# Patient Record
Sex: MALE | Race: WHITE | NOT HISPANIC OR LATINO | Employment: FULL TIME | URBAN - METROPOLITAN AREA
[De-identification: names, ages, dates, MRNs, and addresses within clinical notes are randomized per-mention and may not be internally consistent; named-entity substitution may affect disease eponyms.]

---

## 2018-10-16 LAB
CREAT ?TM UR-SCNC: 160.9 UMOL/L
EXT MICROALBUMIN URINE RANDOM: 5.7
HBA1C MFR BLD HPLC: 6.3 %
MICROALBUMIN/CREAT UR: 3.5 MG/G{CREAT}

## 2018-11-15 LAB
LEFT EYE DIABETIC RETINOPATHY: NORMAL
RIGHT EYE DIABETIC RETINOPATHY: NORMAL

## 2019-01-16 LAB
CREAT ?TM UR-SCNC: 126.4 UMOL/L
EXT MICROALBUMIN URINE RANDOM: 3.2
HBA1C MFR BLD HPLC: 6.3 %
MICROALBUMIN/CREAT UR: 2.5 MG/G{CREAT}

## 2019-03-27 LAB — HBA1C MFR BLD HPLC: 5.9 %

## 2019-09-18 ENCOUNTER — DOCUMENTATION (OUTPATIENT)
Dept: FAMILY MEDICINE CLINIC | Facility: CLINIC | Age: 69
End: 2019-09-18

## 2019-09-18 PROBLEM — D35.2 PITUITARY MACROADENOMA (HCC): Status: ACTIVE | Noted: 2019-09-18

## 2019-09-18 PROBLEM — E11.319 TYPE 2 DIABETES, CONTROLLED, WITH RETINOPATHY (HCC): Status: ACTIVE | Noted: 2019-09-18

## 2019-09-24 ENCOUNTER — OFFICE VISIT (OUTPATIENT)
Dept: FAMILY MEDICINE CLINIC | Facility: CLINIC | Age: 69
End: 2019-09-24
Payer: COMMERCIAL

## 2019-09-24 ENCOUNTER — LAB (OUTPATIENT)
Dept: LAB | Facility: CLINIC | Age: 69
End: 2019-09-24
Payer: COMMERCIAL

## 2019-09-24 ENCOUNTER — TRANSCRIBE ORDERS (OUTPATIENT)
Dept: ADMINISTRATIVE | Facility: HOSPITAL | Age: 69
End: 2019-09-24

## 2019-09-24 VITALS
WEIGHT: 247.36 LBS | SYSTOLIC BLOOD PRESSURE: 132 MMHG | OXYGEN SATURATION: 96 % | HEIGHT: 69 IN | BODY MASS INDEX: 36.64 KG/M2 | DIASTOLIC BLOOD PRESSURE: 84 MMHG | HEART RATE: 73 BPM

## 2019-09-24 DIAGNOSIS — E11.40 CONTROLLED TYPE 2 DIABETES MELLITUS WITH DIABETIC NEUROPATHY, WITH LONG-TERM CURRENT USE OF INSULIN (HCC): ICD-10-CM

## 2019-09-24 DIAGNOSIS — N40.1 BENIGN PROSTATIC HYPERPLASIA WITH NOCTURIA: ICD-10-CM

## 2019-09-24 DIAGNOSIS — Z23 NEEDS FLU SHOT: ICD-10-CM

## 2019-09-24 DIAGNOSIS — D35.2 PITUITARY MACROADENOMA (HCC): ICD-10-CM

## 2019-09-24 DIAGNOSIS — E78.2 HYPERLIPEMIA, MIXED: ICD-10-CM

## 2019-09-24 DIAGNOSIS — N18.30 CONTROLLED TYPE 2 DIABETES MELLITUS WITH STAGE 3 CHRONIC KIDNEY DISEASE, WITH LONG-TERM CURRENT USE OF INSULIN (HCC): ICD-10-CM

## 2019-09-24 DIAGNOSIS — Z79.4 CONTROLLED TYPE 2 DIABETES MELLITUS WITH DIABETIC NEUROPATHY, WITH LONG-TERM CURRENT USE OF INSULIN (HCC): ICD-10-CM

## 2019-09-24 DIAGNOSIS — E11.3299 CONTROLLED TYPE 2 DIABETES MELLITUS WITH MILD NONPROLIFERATIVE RETINOPATHY WITHOUT MACULAR EDEMA, WITHOUT LONG-TERM CURRENT USE OF INSULIN, UNSPECIFIED LATERALITY (HCC): Primary | ICD-10-CM

## 2019-09-24 DIAGNOSIS — Z11.59 NEED FOR HEPATITIS C SCREENING TEST: ICD-10-CM

## 2019-09-24 DIAGNOSIS — Z86.2 HISTORY OF HEPARIN-INDUCED THROMBOCYTOPENIA: ICD-10-CM

## 2019-09-24 DIAGNOSIS — M1A.3790 CHRONIC GOUT DUE TO RENAL IMPAIRMENT INVOLVING TOE WITHOUT TOPHUS, UNSPECIFIED LATERALITY: ICD-10-CM

## 2019-09-24 DIAGNOSIS — Z79.4 CONTROLLED TYPE 2 DIABETES MELLITUS WITH STAGE 3 CHRONIC KIDNEY DISEASE, WITH LONG-TERM CURRENT USE OF INSULIN (HCC): ICD-10-CM

## 2019-09-24 DIAGNOSIS — R35.1 BENIGN PROSTATIC HYPERPLASIA WITH NOCTURIA: ICD-10-CM

## 2019-09-24 DIAGNOSIS — E11.22 CONTROLLED TYPE 2 DIABETES MELLITUS WITH STAGE 3 CHRONIC KIDNEY DISEASE, WITH LONG-TERM CURRENT USE OF INSULIN (HCC): ICD-10-CM

## 2019-09-24 DIAGNOSIS — I25.810 CORONARY ARTERY DISEASE INVOLVING CORONARY BYPASS GRAFT OF NATIVE HEART WITHOUT ANGINA PECTORIS: ICD-10-CM

## 2019-09-24 DIAGNOSIS — K21.9 GASTROESOPHAGEAL REFLUX DISEASE WITHOUT ESOPHAGITIS: ICD-10-CM

## 2019-09-24 LAB
ALT SERPL W P-5'-P-CCNC: 25 U/L (ref 12–78)
ANION GAP SERPL CALCULATED.3IONS-SCNC: 6 MMOL/L (ref 4–13)
BUN SERPL-MCNC: 23 MG/DL (ref 5–25)
CALCIUM SERPL-MCNC: 9.7 MG/DL (ref 8.3–10.1)
CHLORIDE SERPL-SCNC: 105 MMOL/L (ref 100–108)
CHOLEST SERPL-MCNC: 131 MG/DL (ref 50–200)
CO2 SERPL-SCNC: 28 MMOL/L (ref 21–32)
CREAT SERPL-MCNC: 1.61 MG/DL (ref 0.6–1.3)
CREAT UR-MCNC: 95.3 MG/DL
GFR SERPL CREATININE-BSD FRML MDRD: 44 ML/MIN/1.73SQ M
GLUCOSE P FAST SERPL-MCNC: 101 MG/DL (ref 65–99)
HCV AB SER QL: NORMAL
HDLC SERPL-MCNC: 42 MG/DL (ref 40–60)
LDLC SERPL CALC-MCNC: 47 MG/DL (ref 0–100)
MICROALBUMIN UR-MCNC: 13.5 MG/L (ref 0–20)
MICROALBUMIN/CREAT 24H UR: 14 MG/G CREATININE (ref 0–30)
NONHDLC SERPL-MCNC: 89 MG/DL
POTASSIUM SERPL-SCNC: 4.7 MMOL/L (ref 3.5–5.3)
PSA SERPL-MCNC: 0.9 NG/ML (ref 0–4)
SL AMB POCT HEMOGLOBIN AIC: 6.7 (ref ?–6.5)
SODIUM SERPL-SCNC: 139 MMOL/L (ref 136–145)
TRIGL SERPL-MCNC: 208 MG/DL
TSH SERPL DL<=0.05 MIU/L-ACNC: 0.98 UIU/ML (ref 0.36–3.74)
URATE SERPL-MCNC: 7.6 MG/DL (ref 4.2–8)

## 2019-09-24 PROCEDURE — 84153 ASSAY OF PSA TOTAL: CPT

## 2019-09-24 PROCEDURE — 84460 ALANINE AMINO (ALT) (SGPT): CPT

## 2019-09-24 PROCEDURE — 36415 COLL VENOUS BLD VENIPUNCTURE: CPT

## 2019-09-24 PROCEDURE — 86803 HEPATITIS C AB TEST: CPT

## 2019-09-24 PROCEDURE — 90471 IMMUNIZATION ADMIN: CPT

## 2019-09-24 PROCEDURE — 99204 OFFICE O/P NEW MOD 45 MIN: CPT | Performed by: INTERNAL MEDICINE

## 2019-09-24 PROCEDURE — 82570 ASSAY OF URINE CREATININE: CPT | Performed by: INTERNAL MEDICINE

## 2019-09-24 PROCEDURE — 82043 UR ALBUMIN QUANTITATIVE: CPT | Performed by: INTERNAL MEDICINE

## 2019-09-24 PROCEDURE — 84550 ASSAY OF BLOOD/URIC ACID: CPT

## 2019-09-24 PROCEDURE — 80061 LIPID PANEL: CPT

## 2019-09-24 PROCEDURE — 84443 ASSAY THYROID STIM HORMONE: CPT

## 2019-09-24 PROCEDURE — 80048 BASIC METABOLIC PNL TOTAL CA: CPT

## 2019-09-24 PROCEDURE — 90662 IIV NO PRSV INCREASED AG IM: CPT

## 2019-09-24 PROCEDURE — 83036 HEMOGLOBIN GLYCOSYLATED A1C: CPT | Performed by: INTERNAL MEDICINE

## 2019-09-24 RX ORDER — GABAPENTIN 300 MG/1
300 CAPSULE ORAL 2 TIMES DAILY
COMMUNITY
End: 2019-12-19 | Stop reason: SDUPTHER

## 2019-09-24 RX ORDER — LANOLIN ALCOHOL/MO/W.PET/CERES
1000 CREAM (GRAM) TOPICAL DAILY
COMMUNITY

## 2019-09-24 RX ORDER — HYDROCHLOROTHIAZIDE 25 MG/1
25 TABLET ORAL DAILY
COMMUNITY
End: 2019-12-19 | Stop reason: SDUPTHER

## 2019-09-24 RX ORDER — ROSUVASTATIN CALCIUM 20 MG/1
20 TABLET, COATED ORAL DAILY
COMMUNITY
End: 2019-12-05 | Stop reason: SDUPTHER

## 2019-09-24 RX ORDER — PANTOPRAZOLE SODIUM 40 MG/1
40 TABLET, DELAYED RELEASE ORAL DAILY
COMMUNITY
End: 2019-09-24

## 2019-09-24 RX ORDER — AMLODIPINE BESYLATE AND ATORVASTATIN CALCIUM 5; 20 MG/1; MG/1
1 TABLET, FILM COATED ORAL DAILY
COMMUNITY
End: 2019-09-24

## 2019-09-24 RX ORDER — AMLODIPINE BESYLATE AND BENAZEPRIL HYDROCHLORIDE 5; 20 MG/1; MG/1
1 CAPSULE ORAL DAILY
COMMUNITY
End: 2019-12-19 | Stop reason: SDUPTHER

## 2019-09-24 RX ORDER — ALLOPURINOL 300 MG/1
300 TABLET ORAL DAILY
COMMUNITY
End: 2019-12-05 | Stop reason: SDUPTHER

## 2019-09-24 NOTE — ASSESSMENT & PLAN NOTE
We discussed treatment with tamsulosin or other alpha blockers  However, they had been associated with floppy iris syndrome which might be an issue if cataract surgery is needed in the future  Should discuss this issue with ophthalmologist and depending on what the ophthalmologist says, we can't use a medication in the near future

## 2019-09-24 NOTE — ASSESSMENT & PLAN NOTE
Schedule pituitary MRI and referral to Ophthalmology for visual field testing  Will check TSH  We will hold off on checking testosterone as this will not change our management

## 2019-09-24 NOTE — PROGRESS NOTES
Assessment/Plan:    Problem List Items Addressed This Visit        Digestive    Gastroesophageal reflux disease without esophagitis     Will give a trial off of Protonix  If heartburn returns, would recommend trying over-the-counter Zantac or Pepcid  If these are ineffective, may go back on Protonix as needed  Endocrine    Pituitary macroadenoma [facility])     Schedule pituitary MRI and referral to Ophthalmology for visual field testing  Will check TSH  We will hold off on checking testosterone as this will not change our management  Relevant Orders    MRI brain pituitary wo and w contrast    TSH, 3rd generation with Free T4 reflex (Completed)    Controlled type 2 diabetes mellitus with diabetic neuropathy, with long-term current use of insulin ([facility] ) - Primary       Lab Results   Component Value Date    HGBA1C 6 7 (A) 09/24/2019   Excellent glycemic control on current regimen  He was disappointed because his previous A1c was under 6  He is going to redouble his dietary efforts and cut back on his consumption of bread  Continue current regimen  We will set him up with an ophthalmologist          Relevant Medications    Insulin Glargine (TOUJEO MAX SOLOSTAR) 300 units/mL CONCETRATED U-300 injection pen    metFORMIN (GLUCOPHAGE) 500 mg tablet    liraglutide (VICTOZA) injection    Other Relevant Orders    Basic metabolic panel (Completed)    Type 2 diabetes mellitus, controlled, with renal complications ([facility] )     Will follow renal function  Check urine microalbumin  Assure good blood pressure control           Relevant Medications    Insulin Glargine (TOUJEO MAX SOLOSTAR) 300 units/mL CONCETRATED U-300 injection pen    metFORMIN (GLUCOPHAGE) 500 mg tablet    hydrochlorothiazide (HYDRODIURIL) 25 mg tablet    liraglutide (VICTOZA) injection       Cardiovascular and Mediastinum    Coronary artery disease involving coronary bypass graft of native heart without angina pectoris     No angina since bypass surgery  Continue current regimen and risk factor modification  Other    Hyperlipemia, mixed     Will check fasting lipid panel  Relevant Medications    rosuvastatin (CRESTOR) 20 MG tablet    Other Relevant Orders    Lipid panel (Completed)    ALT (Completed)    Benign prostatic hyperplasia with nocturia     We discussed treatment with tamsulosin or other alpha blockers  However, they had been associated with floppy iris syndrome which might be an issue if cataract surgery is needed in the future  Should discuss this issue with ophthalmologist and depending on what the ophthalmologist says, we can't use a medication in the near future  Relevant Orders    PSA Total, Diagnostic (Completed)    History of heparin-induced thrombocytopenia     Occurred in May 2018 after coronary artery bypass surgery  Developed severe thrombocytopenia and bilateral lower extremity DVT  No sequelae  Check CBC  Other Visit Diagnoses     Need for hepatitis C screening test        Relevant Orders    Hepatitis C antibody (Completed)    Needs flu shot        Relevant Orders    influenza vaccine, [id number], high-dose, PF 0 5 mL (FLUZONE HIGH-DOSE) (Completed)    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, unspecified laterality        Relevant Orders    Uric acid (Completed)           Chief complaint: Here to establish care    HPI:    Marek Monsalve is a 81 y o  male here for new patient visit  Checks blood sugars once a day at varying times  Usually checks AM or 3-4 hours after eating  Usually ranges 110-140 no matter what time of the day he checks his blood sugar  His diabetes has been complicated by painful peripheral neuropathy which has required gabapentin which has been effective for his pain  He also has chronic kidney disease with creatinine in the 1 5-1 6 range    He also has coronary artery disease and underwent a 4 vessel CABG in May of 2018 at [facility] including a LIMA to th D1, radial artery to ramus intermedius, SVG to PDA, SVG to PLOM  His postoperative course was complicated by heparin-induced thrombocytopenia and thrombosis manifested by severe persistent thrombocytopenia and bilateral lower extremity DVTs which was treated with anticoagulation  He has not had any chest pain or shortness of breath since his bypass surgery  Past Medical History:   Diagnosis Date    Coronary artery disease     Gout     Podagra alternating toes  No gout attacks for 20 years after staring allopurinol    Heparin induced thrombocytopenia (HCC)     diagnosed at the time of the CABG, had bilateral lower extremity venous clots   Hypercholesteremia     Hypertension     Kidney stones     Has not passed a kidney stone for many years    Pituitary abnormality ([facility] [address] )     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  He has been following with an ophthalmologist   Apparently does not have any visual field defect  Past Surgical History:   Procedure Laterality Date    CORONARY ARTERY BYPASS GRAFT  05/22/2018    [facility]  CABGx 4 (LIMA D1, radial artery to ramus intermedius, SVG to PDA, SVG to PLOM      HERNIA REPAIR      KNEE ARTHROSCOPY W/ MENISCECTOMY Right     KNEE SURGERY      TONSILLECTOMY          Family History   Problem Relation Age of Onset    Hypertension Mother     Hyperlipidemia Mother     Heart failure Mother     Macular degeneration Father     No Known Problems Sister     No Known Problems Sister         Social History     Socioeconomic History    Marital status: /Civil Union     Spouse name: Not on file    Number of children: Not on file    Years of education: Not on file    Highest education level: Not on file   Occupational History    Not on file   Social Needs    Financial resource strain: Not on file    Food insecurity:     Worry: Not on file     Inability: Not on file   sambaash needs:     Medical: Not on file     Non-medical: Not on file   Tobacco Use    Smoking status: Never Smoker    Smokeless tobacco: Never Used   Substance and Sexual Activity    Alcohol use: Never     Frequency: Never    Drug use: Never    Sexual activity: Not on file   Lifestyle    Physical activity:     Days per week: Not on file     Minutes per session: Not on file    Stress: Not on file   Relationships    Social connections:     Talks on phone: Not on file     Gets together: Not on file     Attends [employer] service: Not on file     Active member of club or organization: Not on file     Attends meetings of clubs or organizations: Not on file     Relationship status: Not on file    Intimate partner violence:     Fear of current or ex partner: Not on file     Emotionally abused: Not on file     Physically abused: Not on file     Forced sexual activity: Not on file   Other Topics Concern    Not on file   Social History Narrative    Currently works as a manager for [employer]  Grew up in [address] from [place]"   with 1 living child  Oldest son  secondary to a toxic reaction to a supplement and OTC medication 2019          Current Outpatient Medications   Medication Sig Dispense Refill    allopurinol (ZYLOPRIM) 300 mg tablet Take 300 mg by mouth daily      amLODIPine-benazepril (LOTREL 5-20) 5-20 MG per capsule Take 1 capsule by mouth daily      aspirin 81 MG tablet Take 81 mg by mouth daily      gabapentin (NEURONTIN) 300 mg capsule Take 300 mg by mouth 2 (two) times a day       hydrochlorothiazide (HYDRODIURIL) 25 mg tablet Take 25 mg by mouth daily      Insulin Glargine (TOUJEO MAX SOLOSTAR) 300 units/mL CONCETRATED U-300 injection pen Inject 18 Units under the skin daily       liraglutide (VICTOZA) injection Inject 1 2 mg under the skin daily      metFORMIN (GLUCOPHAGE) 500 mg tablet Take 1,000 mg by mouth 2 (two) times a day with meals       metoprolol tartrate (LOPRESSOR) 25 mg tablet Take 25 mg by mouth every 12 (twelve) hours      rosuvastatin (CRESTOR) 20 MG tablet Take 20 mg by mouth daily      vitamin B-12 (VITAMIN B-12) 1,000 mcg tablet Take by mouth daily            Allergies   Allergen Reactions    Heparin Other (See Comments)     Heparin induced thrombocytopenia and thrombosis manifested by severe thrombocytopenia and bilateral lower extremity DVT       Review of Systems   Constitutional: Positive for unexpected weight change ( has gained about 13 lbs over last 6 months )  Negative for chills, diaphoresis and fever  HENT: Negative for congestion, postnasal drip and rhinorrhea  Eyes: Positive for visual disturbance ( has noticed some flashing peripheral vision at night  Was told he had cataracts in the past )  Respiratory: Negative for cough, shortness of breath and wheezing  Cardiovascular: Negative for chest pain, palpitations and leg swelling  Gastrointestinal: Negative for abdominal pain, blood in stool and constipation  Endocrine: Positive for polyuria  Negative for polydipsia  Genitourinary: Negative for dysuria and hematuria  Gets up to urinate 3-4 times a night  Urinates every 3 hours  Has urgency  No delay starting urine stream but urine stream is not as strong as it used to be a few years ago  He does have double-voiding in the mornings  Last passed kidney stone in 2004   Musculoskeletal: Positive for arthralgias ( bilateral hip pain secondary arthritis (by x-ray)  )  Negative for joint swelling  Skin: Negative for color change and rash  Neurological:        Numbness and occasional tingling/burning in his feet especially at night  Gabapentin has helped  /84 (BP Location: Left arm, Patient Position: Sitting, Cuff Size: Large)   Pulse 73   Ht 5' 9" (1 753 m)   Wt 112 kg (247 lb 5 7 oz)   SpO2 96%   BMI 36 53 kg/m²     General:  Well-developed, well-nourished, in no acute distress    Skin: Warm, moist, no significant lesions  HENT:  Normocephalic, atraumatic, tympanic membranes clear bilaterally, ear canals unremarkable bilaterally, nasal mucosa without lesions, oropharynx was clear without exudate  Eyes:  Bilateral cataracts, visual fields were intact to confrontation in all quadrants, PERRL, EOMI, conjunctivae normal   Neck:  No thyromegaly, thyroid nodules or cervical lymphadenopathy  Cardiac:  Regular rate and rhythm, no murmur, gallop, or rub  There is no JVD or HJR  Well-healed sternotomy scar  Lungs:  Clear to auscultation and percussion  Abdomen:  Soft, nontender, normoactive bowel sounds, no palpable masses, no hepatosplenomegaly  :  Testes were small and soft, descended bilaterally without masses or tenderness, penis was circumcised without lesions or discharge  Rectal exam:  Prostate was mildly enlarged, symmetrical, without nodules  No rectal mass  External hemorrhoids present  S Musculoskeletal:  No clubbing, cyanosis, or edema  Neurologic:  Cranial nerves 2-12 intact, motor was 5/5 in all major groups, DTRs 2+ throughout  Psychiatric:  Mood bright, appropriate affect and insight  Patient's shoes and socks removed  Right Foot/Ankle   Right Foot Inspection  Skin Exam: skin normal and skin intact no dry skin, no warmth, no callus, no erythema, no maceration, no abnormal color, no pre-ulcer, no ulcer and no callus                            Sensory       Monofilament testing: intact  Vascular    The right DP pulse is 2+  The right PT pulse is 2+  Left Foot/Ankle  Left Foot Inspection  Skin Exam: skin normal and skin intactno dry skin, no warmth, no erythema, no maceration, normal color, no pre-ulcer, no ulcer and no callus                                         Sensory       Monofilament: intact  Vascular    The left DP pulse is 2+  The left PT pulse is 2+  Assign Risk Category:  No deformity present;  No loss of protective sensation; Weak pulses       Risk: 0      BMI Counseling: Body mass index is 36 53 kg/m²  The BMI is above normal  Nutrition recommendations include decreasing overall calorie intake and moderation in carbohydrate intake

## 2019-09-24 NOTE — ASSESSMENT & PLAN NOTE
Will give a trial off of Protonix  If heartburn returns, would recommend trying over-the-counter Zantac or Pepcid  If these are ineffective, may go back on Protonix as needed

## 2019-09-24 NOTE — PATIENT INSTRUCTIONS
Gastroesophageal reflux disease without esophagitis  Will give a trial off of Protonix  If heartburn returns, would recommend trying over-the-counter Zantac or Pepcid  If these are ineffective, may go back on Protonix as needed  [facility] ([facility] )  Schedule pituitary MRI and referral to Ophthalmology for visual field testing  Will check TSH  We will hold off on checking testosterone as this will not change our management  Coronary artery disease involving coronary bypass graft of native heart without angina pectoris  No angina since bypass surgery  Continue current regimen and risk factor modification  Hyperlipemia, mixed  Will check fasting lipid panel  Benign prostatic hyperplasia with nocturia  We discussed treatment with tamsulosin or other alpha blockers  However, they had been associated with floppy iris syndrome which might be an issue if cataract surgery is needed in the future  Should discuss this issue with ophthalmologist and depending on what the ophthalmologist says, we can't use a medication in the near future

## 2019-09-26 PROBLEM — E11.29 TYPE 2 DIABETES MELLITUS, CONTROLLED, WITH RENAL COMPLICATIONS (HCC): Status: ACTIVE | Noted: 2019-09-26

## 2019-09-26 PROBLEM — Z86.2 HISTORY OF HEPARIN-INDUCED THROMBOCYTOPENIA: Status: ACTIVE | Noted: 2019-09-26

## 2019-09-26 NOTE — ASSESSMENT & PLAN NOTE
Lab Results   Component Value Date    HGBA1C 6 7 (A) 09/24/2019   Excellent glycemic control on current regimen  He was disappointed because his previous A1c was under 6  He is going to redouble his dietary efforts and cut back on his consumption of bread  Continue current regimen    We will set him up with an ophthalmologist

## 2019-09-26 NOTE — ASSESSMENT & PLAN NOTE
Occurred in May 2018 after coronary artery bypass surgery  Developed severe thrombocytopenia and bilateral lower extremity DVT  No sequelae  Check CBC

## 2019-10-01 LAB
LEFT EYE DIABETIC RETINOPATHY: NORMAL
RIGHT EYE DIABETIC RETINOPATHY: NORMAL

## 2019-10-03 ENCOUNTER — HOSPITAL ENCOUNTER (OUTPATIENT)
Dept: MRI IMAGING | Facility: HOSPITAL | Age: 69
Discharge: HOME | End: 2019-10-03
Payer: COMMERCIAL

## 2019-10-03 DIAGNOSIS — D35.2 PITUITARY MACROADENOMA (HCC): ICD-10-CM

## 2019-10-03 PROCEDURE — 70553 MRI BRAIN STEM W/O & W/DYE: CPT

## 2019-10-03 PROCEDURE — A9585 GADOBUTROL INJECTION: HCPCS | Performed by: INTERNAL MEDICINE

## 2019-10-03 RX ADMIN — GADOBUTROL 1 ML: 604.72 INJECTION INTRAVENOUS at 12:22

## 2019-10-03 RX ADMIN — GADOBUTROL 10 ML: 604.72 INJECTION INTRAVENOUS at 12:22

## 2019-10-04 ENCOUNTER — PATIENT MESSAGE (OUTPATIENT)
Dept: FAMILY MEDICINE CLINIC | Facility: CLINIC | Age: 69
End: 2019-10-04

## 2019-10-04 DIAGNOSIS — E11.40 CONTROLLED TYPE 2 DIABETES MELLITUS WITH DIABETIC NEUROPATHY, WITH LONG-TERM CURRENT USE OF INSULIN (HCC): Primary | ICD-10-CM

## 2019-10-04 DIAGNOSIS — Z79.4 CONTROLLED TYPE 2 DIABETES MELLITUS WITH DIABETIC NEUROPATHY, WITH LONG-TERM CURRENT USE OF INSULIN (HCC): Primary | ICD-10-CM

## 2019-12-05 ENCOUNTER — PATIENT MESSAGE (OUTPATIENT)
Dept: FAMILY MEDICINE CLINIC | Facility: CLINIC | Age: 69
End: 2019-12-05

## 2019-12-05 DIAGNOSIS — M1A.3790 CHRONIC GOUT DUE TO RENAL IMPAIRMENT INVOLVING TOE WITHOUT TOPHUS, UNSPECIFIED LATERALITY: ICD-10-CM

## 2019-12-05 DIAGNOSIS — E78.2 HYPERLIPEMIA, MIXED: Primary | ICD-10-CM

## 2019-12-05 RX ORDER — ALLOPURINOL 300 MG/1
300 TABLET ORAL DAILY
Qty: 90 TABLET | Refills: 1 | Status: SHIPPED | OUTPATIENT
Start: 2019-12-05 | End: 2020-10-27

## 2019-12-05 RX ORDER — ROSUVASTATIN CALCIUM 20 MG/1
20 TABLET, COATED ORAL DAILY
Qty: 90 TABLET | Refills: 1 | Status: SHIPPED | OUTPATIENT
Start: 2019-12-05 | End: 2020-07-01

## 2019-12-19 ENCOUNTER — PATIENT MESSAGE (OUTPATIENT)
Dept: FAMILY MEDICINE CLINIC | Facility: CLINIC | Age: 69
End: 2019-12-19

## 2019-12-19 DIAGNOSIS — Z79.4 CONTROLLED TYPE 2 DIABETES MELLITUS WITH DIABETIC NEUROPATHY, WITH LONG-TERM CURRENT USE OF INSULIN (HCC): ICD-10-CM

## 2019-12-19 DIAGNOSIS — I25.810 CORONARY ARTERY DISEASE INVOLVING CORONARY BYPASS GRAFT OF NATIVE HEART WITHOUT ANGINA PECTORIS: Primary | ICD-10-CM

## 2019-12-19 DIAGNOSIS — I10 ESSENTIAL HYPERTENSION: ICD-10-CM

## 2019-12-19 DIAGNOSIS — E11.40 CONTROLLED TYPE 2 DIABETES MELLITUS WITH DIABETIC NEUROPATHY, WITH LONG-TERM CURRENT USE OF INSULIN (HCC): ICD-10-CM

## 2019-12-19 RX ORDER — HYDROCHLOROTHIAZIDE 25 MG/1
25 TABLET ORAL DAILY
Qty: 30 TABLET | Refills: 5 | Status: SHIPPED | OUTPATIENT
Start: 2019-12-19 | End: 2020-06-29

## 2019-12-19 RX ORDER — AMLODIPINE BESYLATE AND BENAZEPRIL HYDROCHLORIDE 5; 20 MG/1; MG/1
1 CAPSULE ORAL DAILY
Qty: 30 CAPSULE | Refills: 5 | Status: SHIPPED | OUTPATIENT
Start: 2019-12-19 | End: 2020-01-29

## 2019-12-19 RX ORDER — GABAPENTIN 300 MG/1
300 CAPSULE ORAL 2 TIMES DAILY
Qty: 60 CAPSULE | Refills: 5 | Status: SHIPPED | OUTPATIENT
Start: 2019-12-19 | End: 2020-02-10 | Stop reason: SDUPTHER

## 2020-01-06 ENCOUNTER — OFFICE VISIT (OUTPATIENT)
Dept: FAMILY MEDICINE CLINIC | Facility: CLINIC | Age: 70
End: 2020-01-06
Payer: MEDICARE

## 2020-01-06 VITALS
DIASTOLIC BLOOD PRESSURE: 82 MMHG | WEIGHT: 260.58 LBS | SYSTOLIC BLOOD PRESSURE: 128 MMHG | HEIGHT: 69 IN | OXYGEN SATURATION: 96 % | HEART RATE: 78 BPM | BODY MASS INDEX: 38.6 KG/M2

## 2020-01-06 DIAGNOSIS — N18.30 CONTROLLED TYPE 2 DIABETES MELLITUS WITH STAGE 3 CHRONIC KIDNEY DISEASE, WITH LONG-TERM CURRENT USE OF INSULIN (HCC): Primary | ICD-10-CM

## 2020-01-06 DIAGNOSIS — E66.01 CLASS 2 SEVERE OBESITY DUE TO EXCESS CALORIES WITH SERIOUS COMORBIDITY AND BODY MASS INDEX (BMI) OF 38.0 TO 38.9 IN ADULT (HCC): ICD-10-CM

## 2020-01-06 DIAGNOSIS — E11.40 CONTROLLED TYPE 2 DIABETES MELLITUS WITH DIABETIC NEUROPATHY, WITH LONG-TERM CURRENT USE OF INSULIN (HCC): ICD-10-CM

## 2020-01-06 DIAGNOSIS — E11.22 CONTROLLED TYPE 2 DIABETES MELLITUS WITH STAGE 3 CHRONIC KIDNEY DISEASE, WITH LONG-TERM CURRENT USE OF INSULIN (HCC): Primary | ICD-10-CM

## 2020-01-06 DIAGNOSIS — K21.9 GASTROESOPHAGEAL REFLUX DISEASE WITHOUT ESOPHAGITIS: ICD-10-CM

## 2020-01-06 DIAGNOSIS — D35.2 PITUITARY MACROADENOMA (HCC): ICD-10-CM

## 2020-01-06 DIAGNOSIS — Z79.4 CONTROLLED TYPE 2 DIABETES MELLITUS WITH STAGE 3 CHRONIC KIDNEY DISEASE, WITH LONG-TERM CURRENT USE OF INSULIN (HCC): Primary | ICD-10-CM

## 2020-01-06 DIAGNOSIS — Z79.4 CONTROLLED TYPE 2 DIABETES MELLITUS WITH DIABETIC NEUROPATHY, WITH LONG-TERM CURRENT USE OF INSULIN (HCC): ICD-10-CM

## 2020-01-06 DIAGNOSIS — E78.2 HYPERLIPEMIA, MIXED: ICD-10-CM

## 2020-01-06 DIAGNOSIS — M1A.3790 CHRONIC GOUT DUE TO RENAL IMPAIRMENT INVOLVING TOE WITHOUT TOPHUS, UNSPECIFIED LATERALITY: ICD-10-CM

## 2020-01-06 PROBLEM — E66.812 CLASS 2 SEVERE OBESITY DUE TO EXCESS CALORIES WITH SERIOUS COMORBIDITY AND BODY MASS INDEX (BMI) OF 38.0 TO 38.9 IN ADULT (HCC): Status: ACTIVE | Noted: 2020-01-06

## 2020-01-06 LAB — SL AMB POCT HEMOGLOBIN AIC: 7.2 (ref ?–6.5)

## 2020-01-06 PROCEDURE — 99214 OFFICE O/P EST MOD 30 MIN: CPT | Performed by: INTERNAL MEDICINE

## 2020-01-06 PROCEDURE — G0439 PPPS, SUBSEQ VISIT: HCPCS | Performed by: INTERNAL MEDICINE

## 2020-01-06 PROCEDURE — 83036 HEMOGLOBIN GLYCOSYLATED A1C: CPT | Performed by: INTERNAL MEDICINE

## 2020-01-06 RX ORDER — MAG HYDROX/ALUMINUM HYD/SIMETH 400-400-40
5000 SUSPENSION, ORAL (FINAL DOSE FORM) ORAL DAILY
COMMUNITY

## 2020-01-06 RX ORDER — PANTOPRAZOLE SODIUM 40 MG/1
40 TABLET, DELAYED RELEASE ORAL
Qty: 90 TABLET | Refills: 1 | Status: SHIPPED | OUTPATIENT
Start: 2020-01-06 | End: 2020-05-18

## 2020-01-06 NOTE — ASSESSMENT & PLAN NOTE
Lab Results   Component Value Date    HGBA1C 7 2 (A) 01/06/2020   Would continue current regimen with insulin glargine and Victoza

## 2020-01-06 NOTE — PROGRESS NOTES
[facility] is here for his Subsequent Wellness visit  Last Medicare Wellness visit information reviewed, patient interviewed and updates made to the record today  Health Risk Assessment:   Patient rates overall health as good  Patient feels that their physical health rating is same  Eyesight was rated as slightly worse  Hearing was rated as slightly worse  Patient feels that their emotional and mental health rating is much better  Pain experienced in the last 7 days has been some  Patient's pain rating has been 4/10  Patient states that he has experienced no weight loss or gain in last 6 months  Has neuropathy and joint pains (1st thing in the morning and improves after up and around)  Gabapentin helps  Depression Screening:   PHQ-2 Score: 0      Fall Risk Screening: In the past year, patient has experienced: no history of falling in past year      Home Safety:  Patient does not have trouble with stairs inside or outside of their home  Patient has working smoke alarms and has working carbon monoxide detector  Home safety hazards include: none  Nutrition:   Current diet is Regular  Medications:   Patient is currently taking over-the-counter supplements  OTC medications include: see medication list  Patient is able to manage medications  Activities of Daily Living (ADLs)/Instrumental Activities of Daily Living (IADLs):   Walk and transfer into and out of bed and chair?: Yes  Dress and groom yourself?: Yes    Bathe or shower yourself?: Yes    Feed yourself?  Yes  Do your laundry/housekeeping?: Yes  Manage your money, pay your bills and track your expenses?: Yes  Make your own meals?: Yes    Do your own shopping?: Yes    Durable Medical Equipment Suppliers  None    Previous Hospitalizations:   Any hospitalizations or ED visits within the last 12 months?: No      Advance Care Planning:   Living will: No    Durable POA for healthcare: No    Advanced directive: No    Advanced directive counseling given: Yes    Five wishes given: Yes    Patient declined ACP directive: No    End of Life Decisions reviewed with patient: Yes      Cognitive Screening:   Provider or family/friend/caregiver concerned regarding cognition?: No    PREVENTIVE SCREENINGS      Cardiovascular Screening:    General: Screening Not Indicated and History Lipid Disorder      Diabetes Screening:     General: Screening Not Indicated and History Diabetes      Colorectal Cancer Screening:     General: Screening Current      Prostate Cancer Screening:    General: Screening Current      Osteoporosis Screening:    General: Screening Not Indicated      Abdominal Aortic Aneurysm (AAA) Screening:    Risk factors include: age between 73-69 yo        General: Screening Not Indicated      Lung Cancer Screening:     General: Screening Not Indicated      Hepatitis C Screening:    General: Screening Current

## 2020-01-06 NOTE — ASSESSMENT & PLAN NOTE
Will obtain the MRI the pituitary gland from [facility]  MRI here at [facility] did not reveal a macroadenoma

## 2020-01-06 NOTE — PROGRESS NOTES
Assessment/Plan:    Problem List Items Addressed This Visit        Digestive    Gastroesophageal reflux disease without esophagitis     Reflux relapse after stopping pantoprazole  Restart pantoprazole 40 mg daily  Relevant Medications    pantoprazole (PROTONIX) 40 mg tablet       Endocrine    Pituitary macroadenoma ([facility] )     Will obtain the MRI the pituitary gland from [facility]  MRI here at [facility] did not reveal a macroadenoma  Controlled type 2 diabetes mellitus with diabetic neuropathy, with long-term current use of insulin ([facility])    Type 2 diabetes mellitus, controlled, with renal complications ([facility] ) - Primary       Lab Results   Component Value Date    HGBA1C 7 2 (A) 01/06/2020   Would continue current regimen with insulin glargine and Victoza  Relevant Orders    POCT hemoglobin A1c (Completed)    Hemoglobin M4E    Basic metabolic panel       Musculoskeletal and Integument    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     No gout attack for years on allopurinol  Check uric acid level before next visit  Relevant Orders    Uric acid       Other    Hyperlipemia, mixed    Relevant Orders    Lipid panel    Class 2 severe obesity due to excess calories with serious comorbidity and body mass index (BMI) of 38 0 to 38 9 in [facility])     We had a long discussion about strategies for weight loss and encouraged him to focus on heating mostly a plant based diet and to limit processed foods and meat and dairy  Goal is to lose 10 lb by his follow-up visit in 3 months  Chief Complaint     Medicare Wellness Visit          Patient ID: Barron Whyte is a 81 y o  male here for AWV and follow up of multiple problems  He has gained 13 lbs since the last visit in September  He attributes to not getting as much exercise  He had been using the treadmill and exercise bike and light weights for 1 hour 3 days a week   He hadn't joined a gym since moving to [place] but just joined 1  His MRI of pituitary here at [facility] did not reveal a macroadenoma  We have not been able to get the images from [facility] from his previous pituitary MRIs  He has not had a gout attack for years on allopurinol 300 mg daily  After he discontinued pantoprazole, his reflux returns so he started taking pantoprazole 40 mg daily  He has not had any chest pain or shortness of breath  Reviewed his medications today and updated the medication list       Objective:    /82 (BP Location: Right arm, Patient Position: Sitting, Cuff Size: Large)   Pulse 78   Ht 5' 9" (1 753 m)   Wt 118 kg (260 lb 9 3 oz)   SpO2 96%   BMI 38 48 kg/m²     Wt Readings from Last 3 Encounters:   01/06/20 118 kg (260 lb 9 3 oz)   09/24/19 112 kg (247 lb 5 7 oz)         Physical Exam    General:  Well-developed, obese, in no acute distress  Cardiac:  Regular rate and rhythm, no murmur, gallop, or rub  There is no JVD or HJR  Lungs:  Clear to auscultation and percussion  Abdomen:  Soft, nontender, normoactive bowel sounds, no palpable masses, no hepatosplenomegaly  Extremities:  No clubbing, cyanosis, or edema  BMI Counseling: Body mass index is 38 48 kg/m²  The BMI is above normal  Nutrition recommendations include encouraging healthy choices of fruits and vegetables  Exercise recommendations include exercising 3-5 times per week

## 2020-01-06 NOTE — ASSESSMENT & PLAN NOTE
We had a long discussion about strategies for weight loss and encouraged him to focus on heating mostly a plant based diet and to limit processed foods and meat and dairy  Goal is to lose 10 lb by his follow-up visit in 3 months

## 2020-01-06 NOTE — PATIENT INSTRUCTIONS
Please complete our patient survey and let us know about your experience today  Problem List Items Addressed This Visit        Digestive    Gastroesophageal reflux disease without esophagitis     Reflux relapse after stopping pantoprazole  Restart pantoprazole 40 mg daily  Relevant Medications    pantoprazole (PROTONIX) 40 mg tablet       Endocrine    [facility]     Will obtain the MRI the pituitary gland from [facility]  MRI here at [facility] did not reveal a macroadenoma  Controlled type 2 diabetes mellitus with diabetic neuropathy, with long-term current use of insulin ([facility])    Type 2 diabetes mellitus, controlled, with renal complications ([facility] [address] ) - Primary       Lab Results   Component Value Date    HGBA1C 7 2 (A) 01/06/2020   Would continue current regimen with insulin glargine and Victoza  Relevant Orders    POCT hemoglobin A1c (Completed)    Hemoglobin J2P    Basic metabolic panel       Musculoskeletal and Integument    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

## 2020-01-06 NOTE — ASSESSMENT & PLAN NOTE
Lab Results   Component Value Date    HGBA1C 7 2 (A) 01/06/2020   Would increase the nighttime dose of gabapentin to 600 mg and continue 300 mg in the morning

## 2020-01-28 DIAGNOSIS — I10 ESSENTIAL HYPERTENSION: ICD-10-CM

## 2020-01-29 RX ORDER — AMLODIPINE BESYLATE AND BENAZEPRIL HYDROCHLORIDE 5; 20 MG/1; MG/1
CAPSULE ORAL
Qty: 90 CAPSULE | Refills: 3 | Status: SHIPPED | OUTPATIENT
Start: 2020-01-29 | End: 2021-02-03

## 2020-02-10 DIAGNOSIS — E11.40 CONTROLLED TYPE 2 DIABETES MELLITUS WITH DIABETIC NEUROPATHY, WITH LONG-TERM CURRENT USE OF INSULIN (HCC): ICD-10-CM

## 2020-02-10 DIAGNOSIS — Z79.4 CONTROLLED TYPE 2 DIABETES MELLITUS WITH DIABETIC NEUROPATHY, WITH LONG-TERM CURRENT USE OF INSULIN (HCC): ICD-10-CM

## 2020-02-10 RX ORDER — GABAPENTIN 300 MG/1
CAPSULE ORAL
Qty: 90 CAPSULE | Refills: 5 | Status: SHIPPED | OUTPATIENT
Start: 2020-02-10 | End: 2020-08-07 | Stop reason: SDUPTHER

## 2020-04-25 DIAGNOSIS — Z79.4 CONTROLLED TYPE 2 DIABETES MELLITUS WITH DIABETIC NEUROPATHY, WITH LONG-TERM CURRENT USE OF INSULIN (HCC): ICD-10-CM

## 2020-04-25 DIAGNOSIS — E11.40 CONTROLLED TYPE 2 DIABETES MELLITUS WITH DIABETIC NEUROPATHY, WITH LONG-TERM CURRENT USE OF INSULIN (HCC): ICD-10-CM

## 2020-04-26 RX ORDER — LIRAGLUTIDE 6 MG/ML
INJECTION SUBCUTANEOUS
Qty: 6 ML | Refills: 5 | Status: SHIPPED | OUTPATIENT
Start: 2020-04-26 | End: 2020-10-15

## 2020-04-27 DIAGNOSIS — Z79.4 CONTROLLED TYPE 2 DIABETES MELLITUS WITH DIABETIC NEUROPATHY, WITH LONG-TERM CURRENT USE OF INSULIN (HCC): ICD-10-CM

## 2020-04-27 DIAGNOSIS — E11.40 CONTROLLED TYPE 2 DIABETES MELLITUS WITH DIABETIC NEUROPATHY, WITH LONG-TERM CURRENT USE OF INSULIN (HCC): ICD-10-CM

## 2020-05-12 ENCOUNTER — APPOINTMENT (OUTPATIENT)
Dept: LAB | Facility: HOSPITAL | Age: 70
End: 2020-05-12
Attending: INTERNAL MEDICINE
Payer: MEDICARE

## 2020-05-12 DIAGNOSIS — M1A.3790 CHRONIC GOUT DUE TO RENAL IMPAIRMENT INVOLVING TOE WITHOUT TOPHUS, UNSPECIFIED LATERALITY: ICD-10-CM

## 2020-05-12 LAB
ANION GAP SERPL CALCULATED.3IONS-SCNC: 8 MMOL/L (ref 4–13)
BUN SERPL-MCNC: 21 MG/DL (ref 5–25)
CALCIUM SERPL-MCNC: 8.9 MG/DL (ref 8.3–10.1)
CHLORIDE SERPL-SCNC: 101 MMOL/L (ref 100–108)
CHOLEST SERPL-MCNC: 118 MG/DL (ref 50–200)
CO2 SERPL-SCNC: 27 MMOL/L (ref 21–32)
CREAT SERPL-MCNC: 1.98 MG/DL (ref 0.6–1.3)
EST. AVERAGE GLUCOSE BLD GHB EST-MCNC: 174 MG/DL
GFR SERPL CREATININE-BSD FRML MDRD: 34 ML/MIN/1.73SQ M
GLUCOSE P FAST SERPL-MCNC: 161 MG/DL (ref 65–99)
HBA1C MFR BLD: 7.7 %
HDLC SERPL-MCNC: 36 MG/DL
LDLC SERPL CALC-MCNC: 23 MG/DL (ref 0–100)
NONHDLC SERPL-MCNC: 82 MG/DL
POTASSIUM SERPL-SCNC: 4.9 MMOL/L (ref 3.5–5.3)
SODIUM SERPL-SCNC: 136 MMOL/L (ref 136–145)
TRIGL SERPL-MCNC: 294 MG/DL
URATE SERPL-MCNC: 6.5 MG/DL (ref 4.2–8)

## 2020-05-12 PROCEDURE — 84550 ASSAY OF BLOOD/URIC ACID: CPT

## 2020-05-12 PROCEDURE — 83036 HEMOGLOBIN GLYCOSYLATED A1C: CPT | Performed by: INTERNAL MEDICINE

## 2020-05-12 PROCEDURE — 36415 COLL VENOUS BLD VENIPUNCTURE: CPT | Performed by: INTERNAL MEDICINE

## 2020-05-12 PROCEDURE — 80048 BASIC METABOLIC PNL TOTAL CA: CPT | Performed by: INTERNAL MEDICINE

## 2020-05-12 PROCEDURE — 80061 LIPID PANEL: CPT | Performed by: INTERNAL MEDICINE

## 2020-05-15 ENCOUNTER — TELEPHONE (OUTPATIENT)
Dept: FAMILY MEDICINE CLINIC | Facility: CLINIC | Age: 70
End: 2020-05-15

## 2020-05-18 ENCOUNTER — OFFICE VISIT (OUTPATIENT)
Dept: FAMILY MEDICINE CLINIC | Facility: CLINIC | Age: 70
End: 2020-05-18
Payer: MEDICARE

## 2020-05-18 VITALS
OXYGEN SATURATION: 98 % | BODY MASS INDEX: 37.92 KG/M2 | TEMPERATURE: 97.5 F | HEART RATE: 76 BPM | DIASTOLIC BLOOD PRESSURE: 72 MMHG | HEIGHT: 69 IN | WEIGHT: 256 LBS | SYSTOLIC BLOOD PRESSURE: 118 MMHG

## 2020-05-18 DIAGNOSIS — D35.2 PITUITARY MACROADENOMA (HCC): ICD-10-CM

## 2020-05-18 DIAGNOSIS — N18.30 CONTROLLED TYPE 2 DIABETES MELLITUS WITH STAGE 3 CHRONIC KIDNEY DISEASE, WITH LONG-TERM CURRENT USE OF INSULIN (HCC): ICD-10-CM

## 2020-05-18 DIAGNOSIS — E78.2 HYPERLIPEMIA, MIXED: ICD-10-CM

## 2020-05-18 DIAGNOSIS — Z79.4 CONTROLLED TYPE 2 DIABETES MELLITUS WITH DIABETIC NEUROPATHY, WITH LONG-TERM CURRENT USE OF INSULIN (HCC): Primary | ICD-10-CM

## 2020-05-18 DIAGNOSIS — E11.22 CONTROLLED TYPE 2 DIABETES MELLITUS WITH STAGE 3 CHRONIC KIDNEY DISEASE, WITH LONG-TERM CURRENT USE OF INSULIN (HCC): ICD-10-CM

## 2020-05-18 DIAGNOSIS — Z79.4 CONTROLLED TYPE 2 DIABETES MELLITUS WITH STAGE 3 CHRONIC KIDNEY DISEASE, WITH LONG-TERM CURRENT USE OF INSULIN (HCC): ICD-10-CM

## 2020-05-18 DIAGNOSIS — M1A.3790 CHRONIC GOUT DUE TO RENAL IMPAIRMENT INVOLVING TOE WITHOUT TOPHUS, UNSPECIFIED LATERALITY: ICD-10-CM

## 2020-05-18 DIAGNOSIS — I25.810 CORONARY ARTERY DISEASE INVOLVING CORONARY BYPASS GRAFT OF NATIVE HEART WITHOUT ANGINA PECTORIS: ICD-10-CM

## 2020-05-18 DIAGNOSIS — E11.40 CONTROLLED TYPE 2 DIABETES MELLITUS WITH DIABETIC NEUROPATHY, WITH LONG-TERM CURRENT USE OF INSULIN (HCC): Primary | ICD-10-CM

## 2020-05-18 PROCEDURE — 1160F RVW MEDS BY RX/DR IN RCRD: CPT | Performed by: INTERNAL MEDICINE

## 2020-05-18 PROCEDURE — 3066F NEPHROPATHY DOC TX: CPT | Performed by: INTERNAL MEDICINE

## 2020-05-18 PROCEDURE — 3078F DIAST BP <80 MM HG: CPT | Performed by: INTERNAL MEDICINE

## 2020-05-18 PROCEDURE — 2022F DILAT RTA XM EVC RTNOPTHY: CPT | Performed by: INTERNAL MEDICINE

## 2020-05-18 PROCEDURE — 3051F HG A1C>EQUAL 7.0%<8.0%: CPT | Performed by: INTERNAL MEDICINE

## 2020-05-18 PROCEDURE — 3074F SYST BP LT 130 MM HG: CPT | Performed by: INTERNAL MEDICINE

## 2020-05-18 PROCEDURE — 3008F BODY MASS INDEX DOCD: CPT | Performed by: INTERNAL MEDICINE

## 2020-05-18 PROCEDURE — 99214 OFFICE O/P EST MOD 30 MIN: CPT | Performed by: INTERNAL MEDICINE

## 2020-05-18 PROCEDURE — 1036F TOBACCO NON-USER: CPT | Performed by: INTERNAL MEDICINE

## 2020-05-20 ENCOUNTER — LAB (OUTPATIENT)
Dept: LAB | Facility: HOSPITAL | Age: 70
End: 2020-05-20
Attending: INTERNAL MEDICINE
Payer: MEDICARE

## 2020-05-20 DIAGNOSIS — D35.2 PITUITARY MACROADENOMA (HCC): ICD-10-CM

## 2020-05-20 LAB
CORTIS AM PEAK SERPL-MCNC: 12.3 UG/DL (ref 4.2–22.4)
TSH SERPL DL<=0.05 MIU/L-ACNC: 1.16 UIU/ML (ref 0.36–3.74)

## 2020-05-20 PROCEDURE — 36415 COLL VENOUS BLD VENIPUNCTURE: CPT

## 2020-05-20 PROCEDURE — 84443 ASSAY THYROID STIM HORMONE: CPT

## 2020-05-20 PROCEDURE — 82533 TOTAL CORTISOL: CPT

## 2020-06-22 DIAGNOSIS — I25.810 CORONARY ARTERY DISEASE INVOLVING CORONARY BYPASS GRAFT OF NATIVE HEART WITHOUT ANGINA PECTORIS: ICD-10-CM

## 2020-06-29 DIAGNOSIS — I10 ESSENTIAL HYPERTENSION: ICD-10-CM

## 2020-06-29 RX ORDER — HYDROCHLOROTHIAZIDE 25 MG/1
TABLET ORAL
Qty: 30 TABLET | Refills: 5 | Status: SHIPPED | OUTPATIENT
Start: 2020-06-29 | End: 2020-12-28

## 2020-07-01 DIAGNOSIS — E78.2 HYPERLIPEMIA, MIXED: ICD-10-CM

## 2020-07-01 RX ORDER — ROSUVASTATIN CALCIUM 20 MG/1
TABLET, COATED ORAL
Qty: 90 TABLET | Refills: 1 | Status: SHIPPED | OUTPATIENT
Start: 2020-07-01 | End: 2020-12-28

## 2020-07-09 ENCOUNTER — APPOINTMENT (OUTPATIENT)
Dept: RADIOLOGY | Facility: CLINIC | Age: 70
End: 2020-07-09
Payer: MEDICARE

## 2020-07-09 ENCOUNTER — TRANSCRIBE ORDERS (OUTPATIENT)
Dept: RADIOLOGY | Facility: CLINIC | Age: 70
End: 2020-07-09

## 2020-07-09 ENCOUNTER — OFFICE VISIT (OUTPATIENT)
Dept: FAMILY MEDICINE CLINIC | Facility: CLINIC | Age: 70
End: 2020-07-09
Payer: MEDICARE

## 2020-07-09 VITALS
SYSTOLIC BLOOD PRESSURE: 130 MMHG | WEIGHT: 263.8 LBS | TEMPERATURE: 99.8 F | OXYGEN SATURATION: 96 % | HEIGHT: 69 IN | BODY MASS INDEX: 39.07 KG/M2 | HEART RATE: 83 BPM | DIASTOLIC BLOOD PRESSURE: 82 MMHG

## 2020-07-09 DIAGNOSIS — M25.562 ACUTE PAIN OF LEFT KNEE: ICD-10-CM

## 2020-07-09 DIAGNOSIS — M25.562 ACUTE PAIN OF LEFT KNEE: Primary | ICD-10-CM

## 2020-07-09 PROCEDURE — 3075F SYST BP GE 130 - 139MM HG: CPT | Performed by: NURSE PRACTITIONER

## 2020-07-09 PROCEDURE — 1160F RVW MEDS BY RX/DR IN RCRD: CPT | Performed by: NURSE PRACTITIONER

## 2020-07-09 PROCEDURE — 3066F NEPHROPATHY DOC TX: CPT | Performed by: NURSE PRACTITIONER

## 2020-07-09 PROCEDURE — 3051F HG A1C>EQUAL 7.0%<8.0%: CPT | Performed by: NURSE PRACTITIONER

## 2020-07-09 PROCEDURE — 3079F DIAST BP 80-89 MM HG: CPT | Performed by: NURSE PRACTITIONER

## 2020-07-09 PROCEDURE — 73562 X-RAY EXAM OF KNEE 3: CPT

## 2020-07-09 PROCEDURE — 1036F TOBACCO NON-USER: CPT | Performed by: NURSE PRACTITIONER

## 2020-07-09 PROCEDURE — 3008F BODY MASS INDEX DOCD: CPT | Performed by: NURSE PRACTITIONER

## 2020-07-09 PROCEDURE — 99213 OFFICE O/P EST LOW 20 MIN: CPT | Performed by: NURSE PRACTITIONER

## 2020-07-09 PROCEDURE — 2022F DILAT RTA XM EVC RTNOPTHY: CPT | Performed by: NURSE PRACTITIONER

## 2020-07-09 RX ORDER — NAPROXEN 500 MG/1
250 TABLET ORAL 2 TIMES DAILY WITH MEALS
Qty: 60 TABLET | Refills: 0 | Status: SHIPPED | OUTPATIENT
Start: 2020-07-09 | End: 2021-03-08

## 2020-07-09 NOTE — PROGRESS NOTES
Assessment/Plan:         Diagnoses and all orders for this visit:    Acute pain of left knee  -     XR knee 3 vw left non injury; Future  -     naproxen (NAPROSYN) 500 mg tablet; Take 0 5 tablets (250 mg total) by mouth 2 (two) times a day with meals      Arthritis vs ligament strain vs combination - he reports he had arthroscopy of his right knee done in the past and there was arthritis in that knee  Will obtain an xray of the left knee for structural imaging and have him complete a week of scheduled naprosyn for pain and inflammation relief  Will await xray results and proceed from there  Subjective:      Patient ID: Ross Mckenzie is a 78 y o  male  Here today for a same day appt with complaint of left knee pain  Onset of pain beginning on June 26th and not self-resolving  Notes the pain occurs when he straightens his leg but not when he is sitting with the leg at a 90 degree angle  Reports pain in the front of his knee at the knee cap and wrapping around the outer side of the left knee to the posterior left knee  States of pain in the left knee when walking but after a distance the pain resolves  Denies any "clicking" or "popping "      The following portions of the patient's history were reviewed and updated as appropriate: allergies, current medications, past family history, past medical history, past social history, past surgical history and problem list     Review of Systems   Musculoskeletal:        Please see HPI  All other systems reviewed and are negative for this appointment  Objective:      /82 (BP Location: Left arm, Patient Position: Sitting, Cuff Size: Large)   Pulse 83   Temp 99 8 °F (37 7 °C)   Ht 5' 9" (1 753 m)   Wt 120 kg (263 lb 12 8 oz)   SpO2 96%   BMI 38 96 kg/m²          Physical Exam   Constitutional: He is oriented to person, place, and time     Musculoskeletal:        Left knee: He exhibits normal range of motion, no swelling, no effusion and normal alignment  No tenderness found  Neurological: He is alert and oriented to person, place, and time

## 2020-07-26 DIAGNOSIS — Z79.4 CONTROLLED TYPE 2 DIABETES MELLITUS WITH DIABETIC NEUROPATHY, WITH LONG-TERM CURRENT USE OF INSULIN (HCC): ICD-10-CM

## 2020-07-26 DIAGNOSIS — E11.40 CONTROLLED TYPE 2 DIABETES MELLITUS WITH DIABETIC NEUROPATHY, WITH LONG-TERM CURRENT USE OF INSULIN (HCC): ICD-10-CM

## 2020-07-27 DIAGNOSIS — Z79.4 CONTROLLED TYPE 2 DIABETES MELLITUS WITH DIABETIC NEUROPATHY, WITH LONG-TERM CURRENT USE OF INSULIN (HCC): ICD-10-CM

## 2020-07-27 DIAGNOSIS — E11.40 CONTROLLED TYPE 2 DIABETES MELLITUS WITH DIABETIC NEUROPATHY, WITH LONG-TERM CURRENT USE OF INSULIN (HCC): ICD-10-CM

## 2020-07-27 RX ORDER — INSULIN GLARGINE 300 U/ML
INJECTION, SOLUTION SUBCUTANEOUS
Qty: 3 ML | Refills: 5 | Status: SHIPPED | OUTPATIENT
Start: 2020-07-27 | End: 2020-11-12 | Stop reason: SDUPTHER

## 2020-08-06 ENCOUNTER — APPOINTMENT (OUTPATIENT)
Dept: LAB | Facility: HOSPITAL | Age: 70
End: 2020-08-06
Attending: INTERNAL MEDICINE
Payer: MEDICARE

## 2020-08-07 DIAGNOSIS — E11.40 CONTROLLED TYPE 2 DIABETES MELLITUS WITH DIABETIC NEUROPATHY, WITH LONG-TERM CURRENT USE OF INSULIN (HCC): ICD-10-CM

## 2020-08-07 DIAGNOSIS — Z79.4 CONTROLLED TYPE 2 DIABETES MELLITUS WITH DIABETIC NEUROPATHY, WITH LONG-TERM CURRENT USE OF INSULIN (HCC): ICD-10-CM

## 2020-08-07 RX ORDER — GABAPENTIN 300 MG/1
CAPSULE ORAL
Qty: 90 CAPSULE | Refills: 1 | Status: SHIPPED | OUTPATIENT
Start: 2020-08-07 | End: 2020-10-09 | Stop reason: SDUPTHER

## 2020-08-10 ENCOUNTER — OFFICE VISIT (OUTPATIENT)
Dept: FAMILY MEDICINE CLINIC | Facility: CLINIC | Age: 70
End: 2020-08-10
Payer: MEDICARE

## 2020-08-10 VITALS
HEART RATE: 74 BPM | TEMPERATURE: 97.3 F | BODY MASS INDEX: 37.23 KG/M2 | DIASTOLIC BLOOD PRESSURE: 74 MMHG | WEIGHT: 251.4 LBS | OXYGEN SATURATION: 95 % | HEIGHT: 69 IN | SYSTOLIC BLOOD PRESSURE: 124 MMHG

## 2020-08-10 DIAGNOSIS — E66.01 CLASS 2 SEVERE OBESITY DUE TO EXCESS CALORIES WITH SERIOUS COMORBIDITY AND BODY MASS INDEX (BMI) OF 37.0 TO 37.9 IN ADULT (HCC): ICD-10-CM

## 2020-08-10 DIAGNOSIS — R10.13 ABDOMINAL PAIN, EPIGASTRIC: ICD-10-CM

## 2020-08-10 DIAGNOSIS — N18.30 CONTROLLED TYPE 2 DIABETES MELLITUS WITH STAGE 3 CHRONIC KIDNEY DISEASE, WITH LONG-TERM CURRENT USE OF INSULIN (HCC): ICD-10-CM

## 2020-08-10 DIAGNOSIS — Z79.4 CONTROLLED TYPE 2 DIABETES MELLITUS WITH STAGE 3 CHRONIC KIDNEY DISEASE, WITH LONG-TERM CURRENT USE OF INSULIN (HCC): ICD-10-CM

## 2020-08-10 DIAGNOSIS — Z00.00 ANNUAL PHYSICAL EXAM: Primary | ICD-10-CM

## 2020-08-10 DIAGNOSIS — Z79.4 CONTROLLED TYPE 2 DIABETES MELLITUS WITH DIABETIC NEUROPATHY, WITH LONG-TERM CURRENT USE OF INSULIN (HCC): ICD-10-CM

## 2020-08-10 DIAGNOSIS — E11.40 CONTROLLED TYPE 2 DIABETES MELLITUS WITH DIABETIC NEUROPATHY, WITH LONG-TERM CURRENT USE OF INSULIN (HCC): ICD-10-CM

## 2020-08-10 DIAGNOSIS — E11.22 CONTROLLED TYPE 2 DIABETES MELLITUS WITH STAGE 3 CHRONIC KIDNEY DISEASE, WITH LONG-TERM CURRENT USE OF INSULIN (HCC): ICD-10-CM

## 2020-08-10 PROCEDURE — 2022F DILAT RTA XM EVC RTNOPTHY: CPT | Performed by: INTERNAL MEDICINE

## 2020-08-10 PROCEDURE — 3066F NEPHROPATHY DOC TX: CPT | Performed by: INTERNAL MEDICINE

## 2020-08-10 PROCEDURE — 1160F RVW MEDS BY RX/DR IN RCRD: CPT | Performed by: INTERNAL MEDICINE

## 2020-08-10 PROCEDURE — 3078F DIAST BP <80 MM HG: CPT | Performed by: INTERNAL MEDICINE

## 2020-08-10 PROCEDURE — 3074F SYST BP LT 130 MM HG: CPT | Performed by: INTERNAL MEDICINE

## 2020-08-10 PROCEDURE — 1036F TOBACCO NON-USER: CPT | Performed by: INTERNAL MEDICINE

## 2020-08-10 PROCEDURE — 3051F HG A1C>EQUAL 7.0%<8.0%: CPT | Performed by: INTERNAL MEDICINE

## 2020-08-10 PROCEDURE — 99214 OFFICE O/P EST MOD 30 MIN: CPT | Performed by: INTERNAL MEDICINE

## 2020-08-10 RX ORDER — OMEPRAZOLE 40 MG/1
40 CAPSULE, DELAYED RELEASE ORAL
Qty: 30 CAPSULE | Refills: 1 | Status: SHIPPED | OUTPATIENT
Start: 2020-08-10 | End: 2020-09-30

## 2020-08-10 NOTE — PROGRESS NOTES
ADULT ANNUAL [facility] PRIMARY CARE    NAME: Rafael Cano  AGE: 78 y o  SEX: male  : 1950     DATE: 8/10/2020     Assessment and Plan:     Problem List Items Addressed This Visit        Endocrine    Type 2 diabetes mellitus, controlled, with renal complications ([facility] [address] )       Lab Results   Component Value Date    HGBA1C 7 3 (H) 2020   Pretty good diabetic control though we would try to get under 7  Would continue with decreasing intake of bread another carbohydrates  Continue current doses of Toujeo and Victoza  Refer to ophthalmology  Relevant Orders    Ambulatory referral to Ophthalmology       Other    Abdominal pain, epigastric     Given the recent addition of naproxen, it is possible that there may be gastritis or even an ulcer which may have been induced by naproxen  Would discontinue naproxen as of today  Will start omeprazole (also known as Prilosec) 40 mg daily  If not better by the end of the week, would recommend checking an abdominal CT scan  Relevant Medications    omeprazole (PriLOSEC) 40 MG capsule      Other Visit Diagnoses     Annual physical exam    -  Primary          Immunizations and preventive care screenings were discussed with patient today  Appropriate education was printed on patient's after visit summary  Counseling:    BMI Counseling: Body mass index is 37 13 kg/m²  The BMI is above normal  Nutrition recommendations include encouraging healthy choices of fruits and vegetables and moderation in carbohydrate intake  Return in about 3 months (around 11/10/2020)  Chief Complaint:     Chief Complaint   Patient presents with    Physical Exam     preventative visit    Abdominal Pain     c/o abdominal pain x 2 weeks, continuous but more pain after eating         History of Present Illness:     Adult Annual Physical   Patient here for a comprehensive physical exam  The patient reports 2 weeks of bilateral upper abdominal pain 2 hours after eating  The pain is located in the epigastric region but does spread across his upper abdomen  There is no associated nausea, vomiting, regurgitation  He has smaller volume bowel movements but is not constipated  No hematochezia or melena  No dysuria or hematuria  He has not had any fever chills or sweats  His weight is down 12 lb in the last month  His blood sugars have improved recently  He is still taking the Toujeo and Victoza  Medication list was reviewed today  Diet and Physical Activity  · Diet/Nutrition: low carb diet and consuming 3-5 servings of fruits/vegetables daily  ·      Depression Screening  PHQ-9 Depression Screening    PHQ-9:    Frequency of the following problems over the past two weeks:            General Health  · No trouble sleeping, no issues with teeth  His vision has been a little cloudy when he looks at the computer  He needs to schedule ophthalmology appointment  [facility]  · Symptoms include: nocturia     Review of Systems:     Review of Systems   Past Medical History:     Past Medical History:   Diagnosis Date    Coronary artery disease     Gout     Podagra alternating toes  No gout attacks for 20 years after staring allopurinol    Heparin induced thrombocytopenia (HCC)     diagnosed at the time of the CABG, had bilateral lower extremity venous clots   Hypercholesteremia     Hypertension     Kidney stones     Has not passed a kidney stone for many years    Pituitary abnormality ([facility] [place] [address] )     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  He has been following with an ophthalmologist   Apparently does not have any visual field defect  Past Surgical History:     Past Surgical History:   Procedure Laterality Date    CORONARY ARTERY BYPASS GRAFT  05/22/2018    [facility]  CABGx 4 (LIMA D1, radial artery to ramus intermedius, SVG to PDA, SVG to PLOM      HERNIA REPAIR      KNEE ARTHROSCOPY W/ MENISCECTOMY Right     KNEE SURGERY      TONSILLECTOMY        Family History:     Family History   Problem Relation Age of Onset    Hypertension Mother     Hyperlipidemia Mother     Heart failure Mother     Macular degeneration Father     No Known Problems Sister     No Known Problems Sister       Social History:        Social History     Socioeconomic History    Marital status: /Civil Union     Spouse name: None    Number of children: None    Years of education: None    Highest education level: None   Occupational History    None   Social Needs    Financial resource strain: None    Food insecurity     Worry: None     Inability: None    Transportation needs     Medical: None     Non-medical: None   Tobacco Use    Smoking status: Never Smoker    Smokeless tobacco: Never Used   Substance and Sexual Activity    Alcohol use: Never     Frequency: Never    Drug use: Never    Sexual activity: Not Currently   Lifestyle    Physical activity     Days per week: None     Minutes per session: None    Stress: None   Relationships    Social connections     Talks on phone: None     Gets together: None     Attends Jehovah's witness service: None     Active member of club or organization: None     Attends meetings of clubs or organizations: None     Relationship status: None    Intimate partner violence     Fear of current or ex partner: None     Emotionally abused: None     Physically abused: None     Forced sexual activity: None   Other Topics Concern    None   Social History Narrative    Currently works as a manager for [employer]  Grew up in [address] from [place]   with 1 living child  Oldest son  secondary to a toxic reaction to a supplement and OTC medication 2019        Current Medications:     Current Outpatient Medications   Medication Sig Dispense Refill    allopurinol (ZYLOPRIM) 300 mg tablet Take 1 tablet (300 mg total) by mouth daily 90 tablet 1    amLODIPine-benazepril (LOTREL 5-20) 5-20 MG per capsule TAKE 1 CAPSULE BY MOUTH ONCE DAILY 90 capsule 3    aspirin 81 MG tablet Take 81 mg by mouth daily      Cholecalciferol (VITAMIN D3) 125 MCG (5000 UT) CAPS Take 5,000 Units by mouth daily      gabapentin (NEURONTIN) 300 mg capsule Take 1 capsule (300 mg total) by mouth daily AND 2 capsules (600 mg total) every evening  90 capsule 1    hydrochlorothiazide (HYDRODIURIL) 25 mg tablet take 1 tablet by mouth once daily 30 tablet 5    metFORMIN (GLUCOPHAGE) 500 mg tablet Take 2 tablets (1,000 mg total) by mouth 2 (two) times a day with meals 360 tablet 3    metoprolol tartrate (LOPRESSOR) 25 mg tablet Take 1 tablet (25 mg total) by mouth every 12 (twelve) hours 60 tablet 5    naproxen (NAPROSYN) 500 mg tablet Take 0 5 tablets (250 mg total) by mouth 2 (two) times a day with meals (Patient taking differently: Take 250 mg by mouth 2 (two) times a day as needed ) 60 tablet 0    rosuvastatin (CRESTOR) 20 MG tablet take 1 tablet by mouth once daily 90 tablet 1    TOUJEO MAX SOLOSTAR 300 units/mL CONCETRATED U-300 injection pen (2-unit dial) inject 18 units subcutaneously once daily (Patient taking differently: Inject 28 Units under the skin daily at bedtime ) 3 mL 5    VICTOZA injection inject 1 2 milligram subcutaneously daily 6 mL 5    vitamin B-12 (VITAMIN B-12) 1,000 mcg tablet Take by mouth daily      omeprazole (PriLOSEC) 40 MG capsule Take 1 capsule (40 mg total) by mouth daily before breakfast 30 capsule 1     No current facility-administered medications for this visit  Allergies:      Allergies   Allergen Reactions    Heparin Other (See Comments)     Heparin induced thrombocytopenia and thrombosis manifested by severe thrombocytopenia and bilateral lower extremity DVT      Physical Exam:     /74 (BP Location: Right arm, Patient Position: Sitting, Cuff Size: Large)   Pulse 74   Temp (!) 97 3 °F (36 3 °C)   Ht 5' 9" (1 163 m)   Wt 114 kg (251 lb 6 4 oz)   SpO2 95%   BMI 37 13 kg/m²       Wt Readings from Last 3 Encounters:   08/10/20 114 kg (251 lb 6 4 oz)   07/09/20 120 kg (263 lb 12 8 oz)   05/18/20 116 kg (256 lb)      Physical Exam  Constitutional:       General: He is not in acute distress  Appearance: He is well-developed  He is not ill-appearing  HENT:      Head: Normocephalic and atraumatic  Mouth/Throat:      Mouth: Mucous membranes are moist       Pharynx: Oropharynx is clear  Eyes:      General: No scleral icterus  Neck:      Vascular: No JVD  Cardiovascular:      Rate and Rhythm: Normal rate and regular rhythm  Pulses: no weak pulses          Dorsalis pedis pulses are 2+ on the right side and 2+ on the left side  Posterior tibial pulses are 2+ on the right side and 2+ on the left side  Heart sounds: Normal heart sounds  No murmur  No friction rub  No gallop  Pulmonary:      Effort: Pulmonary effort is normal       Breath sounds: Normal breath sounds  No wheezing, rhonchi or rales  Abdominal:      General: Abdomen is flat  Bowel sounds are normal  There is no distension or abdominal bruit  Palpations: Abdomen is soft  There is no hepatomegaly, splenomegaly or mass  Tenderness: Tenderness:  Mild, epigastric  There is no guarding or rebound  Feet:      Right foot:      Skin integrity: No ulcer, skin breakdown, erythema, warmth, callus or dry skin  Left foot:      Skin integrity: No ulcer, skin breakdown, erythema, warmth, callus or dry skin  Skin:     General: Skin is warm and dry  Findings: No rash  Neurological:      Mental Status: He is alert  Diabetic Foot Exam    Patient's shoes and socks removed  Right Foot/Ankle   Right Foot Inspection  Skin Exam: skin normal and skin intact no dry skin, no warmth, no callus, no erythema, no maceration, no abnormal color, no pre-ulcer, no ulcer and no callus                            Sensory       Monofilament testing: intact  Vascular    The right DP pulse is 2+  The right PT pulse is 2+  Left Foot/Ankle  Left Foot Inspection  Skin Exam: skin normal and skin intactno dry skin, no warmth, no erythema, no maceration, normal color, no pre-ulcer, no ulcer and no callus                                         Sensory       Monofilament: intact  Vascular    The left DP pulse is 2+  The left PT pulse is 2+  Assign Risk Category:  No deformity present; No loss of protective sensation;  No weak pulses       Risk: 0      MD Robert Gu[place] [id number]

## 2020-08-10 NOTE — PATIENT INSTRUCTIONS
Please complete our patient survey and let us know about your experience today  Problem List Items Addressed This Visit        Endocrine    Type 2 diabetes mellitus, controlled, with renal complications ([facility] [address] )       Lab Results   Component Value Date    HGBA1C 7 3 (H) 08/06/2020   Pretty good diabetic control though we would try to get under 7  Would continue with decreasing intake of bread another carbohydrates  Continue current doses of Toujeo and Victoza  Refer to ophthalmology  Relevant Orders    Ambulatory referral to Ophthalmology       Other    Abdominal pain, epigastric     Given the recent addition of naproxen, it is possible that there may be gastritis or even an ulcer which may have been induced by naproxen  Would discontinue naproxen as of today  Will start omeprazole (also known as Prilosec) 40 mg daily  If not better by the end of the week, would recommend checking an abdominal CT scan           Relevant Medications    omeprazole (PriLOSEC) 40 MG capsule

## 2020-08-10 NOTE — ASSESSMENT & PLAN NOTE
Given the recent addition of naproxen, it is possible that there may be gastritis or even an ulcer which may have been induced by naproxen  Would discontinue naproxen as of today  Will start omeprazole (also known as Prilosec) 40 mg daily  If not better by the end of the week, would recommend checking an abdominal CT scan

## 2020-08-10 NOTE — ASSESSMENT & PLAN NOTE
He has lost some weight but this could be due to the abdominal pain  For now, would recommend limiting carbohydrate intake and increasing the intake of fruits and vegetables

## 2020-08-10 NOTE — ASSESSMENT & PLAN NOTE
Lab Results   Component Value Date    HGBA1C 7 3 (H) 08/06/2020   Pretty good diabetic control though we would try to get under 7  Would continue with decreasing intake of bread another carbohydrates  Continue current doses of Toujeo and Victoza  Refer to ophthalmology  Creatinine improved on his most recent labs but I would not want him to take naproxen long-term  We are discontinuing naproxen today anyway

## 2020-08-10 NOTE — ASSESSMENT & PLAN NOTE
Lab Results   Component Value Date    HGBA1C 7 3 (H) 08/06/2020   Neuropathic symptoms are well controlled on gabapentin    Of note, he does have normal sensation on the monofilament test

## 2020-09-30 DIAGNOSIS — R10.13 ABDOMINAL PAIN, EPIGASTRIC: ICD-10-CM

## 2020-09-30 RX ORDER — OMEPRAZOLE 40 MG/1
CAPSULE, DELAYED RELEASE ORAL
Qty: 30 CAPSULE | Refills: 5 | Status: SHIPPED | OUTPATIENT
Start: 2020-09-30 | End: 2020-12-07

## 2020-10-05 LAB
LEFT EYE DIABETIC RETINOPATHY: NORMAL
RIGHT EYE DIABETIC RETINOPATHY: NORMAL

## 2020-10-09 DIAGNOSIS — E11.40 CONTROLLED TYPE 2 DIABETES MELLITUS WITH DIABETIC NEUROPATHY, WITH LONG-TERM CURRENT USE OF INSULIN (HCC): ICD-10-CM

## 2020-10-09 DIAGNOSIS — Z79.4 CONTROLLED TYPE 2 DIABETES MELLITUS WITH DIABETIC NEUROPATHY, WITH LONG-TERM CURRENT USE OF INSULIN (HCC): ICD-10-CM

## 2020-10-09 RX ORDER — GABAPENTIN 300 MG/1
CAPSULE ORAL
Qty: 90 CAPSULE | Refills: 5 | Status: SHIPPED | OUTPATIENT
Start: 2020-10-09 | End: 2021-04-14 | Stop reason: SDUPTHER

## 2020-10-15 DIAGNOSIS — Z79.4 CONTROLLED TYPE 2 DIABETES MELLITUS WITH DIABETIC NEUROPATHY, WITH LONG-TERM CURRENT USE OF INSULIN (HCC): ICD-10-CM

## 2020-10-15 DIAGNOSIS — E11.40 CONTROLLED TYPE 2 DIABETES MELLITUS WITH DIABETIC NEUROPATHY, WITH LONG-TERM CURRENT USE OF INSULIN (HCC): ICD-10-CM

## 2020-10-15 RX ORDER — LIRAGLUTIDE 6 MG/ML
INJECTION SUBCUTANEOUS
Qty: 6 ML | Refills: 5 | Status: SHIPPED | OUTPATIENT
Start: 2020-10-15 | End: 2021-04-08

## 2020-10-17 DIAGNOSIS — E11.40 CONTROLLED TYPE 2 DIABETES MELLITUS WITH DIABETIC NEUROPATHY, WITH LONG-TERM CURRENT USE OF INSULIN (HCC): ICD-10-CM

## 2020-10-17 DIAGNOSIS — Z79.4 CONTROLLED TYPE 2 DIABETES MELLITUS WITH DIABETIC NEUROPATHY, WITH LONG-TERM CURRENT USE OF INSULIN (HCC): ICD-10-CM

## 2020-10-27 DIAGNOSIS — M1A.3790 CHRONIC GOUT DUE TO RENAL IMPAIRMENT INVOLVING TOE WITHOUT TOPHUS, UNSPECIFIED LATERALITY: ICD-10-CM

## 2020-10-27 RX ORDER — ALLOPURINOL 300 MG/1
TABLET ORAL
Qty: 90 TABLET | Refills: 1 | Status: SHIPPED | OUTPATIENT
Start: 2020-10-27 | End: 2021-04-23

## 2020-11-06 ENCOUNTER — IMMUNIZATIONS (OUTPATIENT)
Dept: FAMILY MEDICINE CLINIC | Facility: CLINIC | Age: 70
End: 2020-11-06
Payer: MEDICARE

## 2020-11-06 VITALS
DIASTOLIC BLOOD PRESSURE: 78 MMHG | WEIGHT: 262.6 LBS | SYSTOLIC BLOOD PRESSURE: 138 MMHG | BODY MASS INDEX: 38.89 KG/M2 | HEIGHT: 69 IN

## 2020-11-06 DIAGNOSIS — Z23 NEEDS FLU SHOT: Primary | ICD-10-CM

## 2020-11-06 PROCEDURE — G0008 ADMIN INFLUENZA VIRUS VAC: HCPCS

## 2020-11-06 PROCEDURE — 90662 IIV NO PRSV INCREASED AG IM: CPT

## 2020-11-12 DIAGNOSIS — Z79.4 CONTROLLED TYPE 2 DIABETES MELLITUS WITH DIABETIC NEUROPATHY, WITH LONG-TERM CURRENT USE OF INSULIN (HCC): ICD-10-CM

## 2020-11-12 DIAGNOSIS — E11.40 CONTROLLED TYPE 2 DIABETES MELLITUS WITH DIABETIC NEUROPATHY, WITH LONG-TERM CURRENT USE OF INSULIN (HCC): ICD-10-CM

## 2020-11-12 RX ORDER — INSULIN GLARGINE 300 U/ML
26 INJECTION, SOLUTION SUBCUTANEOUS
Qty: 9 ML | Refills: 1 | Status: SHIPPED | OUTPATIENT
Start: 2020-11-12 | End: 2021-06-04

## 2020-11-13 ENCOUNTER — TELEPHONE (OUTPATIENT)
Dept: FAMILY MEDICINE CLINIC | Facility: CLINIC | Age: 70
End: 2020-11-13

## 2020-11-16 ENCOUNTER — TELEMEDICINE (OUTPATIENT)
Dept: FAMILY MEDICINE CLINIC | Facility: CLINIC | Age: 70
End: 2020-11-16
Payer: MEDICARE

## 2020-11-16 VITALS — WEIGHT: 251 LBS | BODY MASS INDEX: 37.18 KG/M2 | HEIGHT: 69 IN | TEMPERATURE: 97.4 F

## 2020-11-16 DIAGNOSIS — Z20.822 SUSPECTED COVID-19 VIRUS INFECTION: Primary | ICD-10-CM

## 2020-11-16 PROCEDURE — 99214 OFFICE O/P EST MOD 30 MIN: CPT | Performed by: INTERNAL MEDICINE

## 2020-11-17 ENCOUNTER — TELEMEDICINE (OUTPATIENT)
Dept: FAMILY MEDICINE CLINIC | Facility: CLINIC | Age: 70
End: 2020-11-17
Payer: MEDICARE

## 2020-11-17 ENCOUNTER — APPOINTMENT (EMERGENCY)
Dept: RADIOLOGY | Facility: HOSPITAL | Age: 70
DRG: 177 | End: 2020-11-17
Payer: MEDICARE

## 2020-11-17 ENCOUNTER — HOSPITAL ENCOUNTER (INPATIENT)
Facility: HOSPITAL | Age: 70
LOS: 5 days | Discharge: HOME | DRG: 177 | End: 2020-11-22
Attending: EMERGENCY MEDICINE | Admitting: STUDENT IN AN ORGANIZED HEALTH CARE EDUCATION/TRAINING PROGRAM
Payer: MEDICARE

## 2020-11-17 ENCOUNTER — TELEPHONE (OUTPATIENT)
Dept: FAMILY MEDICINE CLINIC | Facility: CLINIC | Age: 70
End: 2020-11-17

## 2020-11-17 VITALS — TEMPERATURE: 97.4 F | HEIGHT: 69 IN | WEIGHT: 251 LBS | BODY MASS INDEX: 37.18 KG/M2

## 2020-11-17 DIAGNOSIS — R53.83 FATIGUE, UNSPECIFIED TYPE: ICD-10-CM

## 2020-11-17 DIAGNOSIS — R06.02 SHORTNESS OF BREATH: ICD-10-CM

## 2020-11-17 DIAGNOSIS — R09.02 HYPOXIA: Primary | ICD-10-CM

## 2020-11-17 DIAGNOSIS — Z20.822 SUSPECTED COVID-19 VIRUS INFECTION: Primary | ICD-10-CM

## 2020-11-17 DIAGNOSIS — U07.1 COVID-19: ICD-10-CM

## 2020-11-17 PROBLEM — E66.9 OBESITY (BMI 35.0-39.9 WITHOUT COMORBIDITY): Status: ACTIVE | Noted: 2020-11-17

## 2020-11-17 PROBLEM — E78.5 HLD (HYPERLIPIDEMIA): Status: ACTIVE | Noted: 2020-11-17

## 2020-11-17 PROBLEM — I25.10 CAD (CORONARY ARTERY DISEASE): Status: ACTIVE | Noted: 2020-11-17

## 2020-11-17 PROBLEM — E87.6 HYPOKALEMIA: Status: ACTIVE | Noted: 2020-11-17

## 2020-11-17 PROBLEM — I10 HTN (HYPERTENSION): Status: ACTIVE | Noted: 2020-11-17

## 2020-11-17 PROBLEM — M10.9 GOUT: Status: ACTIVE | Noted: 2020-11-17

## 2020-11-17 PROBLEM — E11.9 DIABETES (CMS/HCC): Status: ACTIVE | Noted: 2020-11-17

## 2020-11-17 PROBLEM — N17.9 AKI (ACUTE KIDNEY INJURY) (CMS/HCC): Status: ACTIVE | Noted: 2020-11-17

## 2020-11-17 LAB
ALBUMIN SERPL-MCNC: 3 G/DL (ref 3.4–5)
ALP SERPL-CCNC: 67 IU/L (ref 35–126)
ALT SERPL-CCNC: 21 IU/L (ref 16–63)
ANION GAP SERPL CALC-SCNC: 12 MEQ/L (ref 3–15)
AST SERPL-CCNC: 35 IU/L (ref 15–41)
BASOPHILS # BLD: 0.02 K/UL (ref 0.01–0.1)
BASOPHILS NFR BLD: 0.3 %
BILIRUB SERPL-MCNC: 1.3 MG/DL (ref 0.3–1.2)
BUN SERPL-MCNC: 27 MG/DL (ref 8–20)
CALCIUM SERPL-MCNC: 8.6 MG/DL (ref 8.9–10.3)
CHLORIDE SERPL-SCNC: 101 MEQ/L (ref 98–109)
CO2 SERPL-SCNC: 22 MEQ/L (ref 22–32)
CREAT SERPL-MCNC: 1.4 MG/DL (ref 0.8–1.3)
DIFFERENTIAL METHOD BLD: ABNORMAL
EOSINOPHIL # BLD: 0.02 K/UL (ref 0.04–0.54)
EOSINOPHIL NFR BLD: 0.3 %
ERYTHROCYTE [DISTWIDTH] IN BLOOD BY AUTOMATED COUNT: 18.7 % (ref 11.6–14.4)
FLUAV RNA SPEC QL NAA+PROBE: NEGATIVE
FLUBV RNA SPEC QL NAA+PROBE: NEGATIVE
GFR SERPL CREATININE-BSD FRML MDRD: 50.4 ML/MIN/1.73M*2
GLUCOSE BLD-MCNC: 105 MG/DL (ref 70–99)
GLUCOSE BLD-MCNC: 129 MG/DL (ref 70–99)
GLUCOSE SERPL-MCNC: 126 MG/DL (ref 70–99)
HCT VFR BLDCO AUTO: 38.2 % (ref 40.1–51)
HGB BLD-MCNC: 11.3 G/DL (ref 13.7–17.5)
IMM GRANULOCYTES # BLD AUTO: 0.05 K/UL (ref 0–0.08)
IMM GRANULOCYTES NFR BLD AUTO: 0.8 %
LACTATE SERPL-SCNC: 1.9 MMOL/L (ref 0.4–2)
LYMPHOCYTES # BLD: 0.72 K/UL (ref 1.2–3.5)
LYMPHOCYTES NFR BLD: 12.2 %
MAGNESIUM SERPL-MCNC: 1.9 MG/DL (ref 1.8–2.5)
MCH RBC QN AUTO: 22.5 PG (ref 28–33.2)
MCHC RBC AUTO-ENTMCNC: 29.6 G/DL (ref 32.2–36.5)
MCV RBC AUTO: 76.1 FL (ref 83–98)
MONOCYTES # BLD: 0.32 K/UL (ref 0.3–1)
MONOCYTES NFR BLD: 5.4 %
NEUTROPHILS # BLD: 4.76 K/UL (ref 1.7–7)
NEUTS SEG NFR BLD: 81 %
NRBC BLD-RTO: 0 %
PDW BLD AUTO: 10.9 FL (ref 9.4–12.4)
PLATELET # BLD AUTO: 329 K/UL (ref 150–350)
POCT TEST: ABNORMAL
POCT TEST: ABNORMAL
POTASSIUM SERPL-SCNC: 3.3 MEQ/L (ref 3.6–5.1)
PROT SERPL-MCNC: 7.5 G/DL (ref 6–8.2)
RBC # BLD AUTO: 5.02 M/UL (ref 4.5–5.8)
RSV RNA SPEC QL NAA+PROBE: NEGATIVE
SARS-COV-2 RNA RESP QL NAA+PROBE: POSITIVE
SODIUM SERPL-SCNC: 135 MEQ/L (ref 136–144)
TROPONIN I SERPL-MCNC: 0.02 NG/ML
WBC # BLD AUTO: 5.89 K/UL (ref 3.8–10.5)

## 2020-11-17 PROCEDURE — 80053 COMPREHEN METABOLIC PANEL: CPT | Performed by: NURSE PRACTITIONER

## 2020-11-17 PROCEDURE — 25800000 HC PHARMACY IV SOLUTIONS: Performed by: STUDENT IN AN ORGANIZED HEALTH CARE EDUCATION/TRAINING PROGRAM

## 2020-11-17 PROCEDURE — 71045 X-RAY EXAM CHEST 1 VIEW: CPT

## 2020-11-17 PROCEDURE — XW033E5 INTRODUCTION OF REMDESIVIR ANTI-INFECTIVE INTO PERIPHERAL VEIN, PERCUTANEOUS APPROACH, NEW TECHNOLOGY GROUP 5: ICD-10-PCS | Performed by: INTERNAL MEDICINE

## 2020-11-17 PROCEDURE — 93005 ELECTROCARDIOGRAM TRACING: CPT | Performed by: NURSE PRACTITIONER

## 2020-11-17 PROCEDURE — 83605 ASSAY OF LACTIC ACID: CPT | Performed by: NURSE PRACTITIONER

## 2020-11-17 PROCEDURE — 20600000 HC ROOM AND CARE INTERMEDIATE/TELEMETRY

## 2020-11-17 PROCEDURE — 85025 COMPLETE CBC W/AUTO DIFF WBC: CPT | Performed by: NURSE PRACTITIONER

## 2020-11-17 PROCEDURE — 99285 EMERGENCY DEPT VISIT HI MDM: CPT | Mod: 25

## 2020-11-17 PROCEDURE — 36415 COLL VENOUS BLD VENIPUNCTURE: CPT | Performed by: NURSE PRACTITIONER

## 2020-11-17 PROCEDURE — 84484 ASSAY OF TROPONIN QUANT: CPT | Performed by: NURSE PRACTITIONER

## 2020-11-17 PROCEDURE — 63700000 HC SELF-ADMINISTRABLE DRUG: Performed by: STUDENT IN AN ORGANIZED HEALTH CARE EDUCATION/TRAINING PROGRAM

## 2020-11-17 PROCEDURE — 87040 BLOOD CULTURE FOR BACTERIA: CPT | Performed by: NURSE PRACTITIONER

## 2020-11-17 PROCEDURE — 83735 ASSAY OF MAGNESIUM: CPT | Performed by: NURSE PRACTITIONER

## 2020-11-17 PROCEDURE — 99223 1ST HOSP IP/OBS HIGH 75: CPT | Mod: CR | Performed by: STUDENT IN AN ORGANIZED HEALTH CARE EDUCATION/TRAINING PROGRAM

## 2020-11-17 PROCEDURE — 99213 OFFICE O/P EST LOW 20 MIN: CPT | Performed by: INTERNAL MEDICINE

## 2020-11-17 PROCEDURE — 87637 SARSCOV2&INF A&B&RSV AMP PRB: CPT | Performed by: NURSE PRACTITIONER

## 2020-11-17 RX ORDER — GABAPENTIN 300 MG/1
300 CAPSULE ORAL 2 TIMES DAILY
COMMUNITY

## 2020-11-17 RX ORDER — VIT C/E/ZN/COPPR/LUTEIN/ZEAXAN 250MG-90MG
125 CAPSULE ORAL DAILY
Status: DISCONTINUED | OUTPATIENT
Start: 2020-11-18 | End: 2020-11-22 | Stop reason: HOSPADM

## 2020-11-17 RX ORDER — ACETAMINOPHEN 500 MG
5000 TABLET ORAL DAILY
COMMUNITY

## 2020-11-17 RX ORDER — ALLOPURINOL 300 MG/1
300 TABLET ORAL DAILY
COMMUNITY

## 2020-11-17 RX ORDER — ROSUVASTATIN CALCIUM 20 MG/1
20 TABLET, COATED ORAL DAILY
COMMUNITY

## 2020-11-17 RX ORDER — INSULIN ASPART 100 [IU]/ML
3-5 INJECTION, SOLUTION INTRAVENOUS; SUBCUTANEOUS
Status: DISCONTINUED | OUTPATIENT
Start: 2020-11-18 | End: 2020-11-22 | Stop reason: HOSPADM

## 2020-11-17 RX ORDER — HYDROCHLOROTHIAZIDE 25 MG/1
25 TABLET ORAL DAILY
Status: DISCONTINUED | OUTPATIENT
Start: 2020-11-18 | End: 2020-11-22 | Stop reason: HOSPADM

## 2020-11-17 RX ORDER — LIRAGLUTIDE 6 MG/ML
INJECTION SUBCUTANEOUS DAILY
COMMUNITY

## 2020-11-17 RX ORDER — LANOLIN ALCOHOL/MO/W.PET/CERES
1000 CREAM (GRAM) TOPICAL DAILY
Status: DISCONTINUED | OUTPATIENT
Start: 2020-11-17 | End: 2020-11-22 | Stop reason: HOSPADM

## 2020-11-17 RX ORDER — IBUPROFEN 200 MG
16-32 TABLET ORAL AS NEEDED
Status: DISCONTINUED | OUTPATIENT
Start: 2020-11-17 | End: 2020-11-22 | Stop reason: HOSPADM

## 2020-11-17 RX ORDER — ASPIRIN 81 MG/1
81 TABLET ORAL DAILY
Status: DISCONTINUED | OUTPATIENT
Start: 2020-11-17 | End: 2020-11-22 | Stop reason: HOSPADM

## 2020-11-17 RX ORDER — METOPROLOL TARTRATE 25 MG/1
25 TABLET, FILM COATED ORAL 2 TIMES DAILY
COMMUNITY

## 2020-11-17 RX ORDER — METFORMIN HYDROCHLORIDE 1000 MG/1
500 TABLET ORAL 2 TIMES DAILY WITH MEALS
COMMUNITY

## 2020-11-17 RX ORDER — SODIUM CHLORIDE 9 MG/ML
INJECTION, SOLUTION INTRAVENOUS CONTINUOUS
Status: DISCONTINUED | OUTPATIENT
Start: 2020-11-17 | End: 2020-11-18

## 2020-11-17 RX ORDER — DEXTROSE 50 % IN WATER (D50W) INTRAVENOUS SYRINGE
25 AS NEEDED
Status: DISCONTINUED | OUTPATIENT
Start: 2020-11-17 | End: 2020-11-22 | Stop reason: HOSPADM

## 2020-11-17 RX ORDER — HYDROCHLOROTHIAZIDE 25 MG/1
25 TABLET ORAL DAILY
COMMUNITY

## 2020-11-17 RX ORDER — ALBUTEROL SULFATE 90 UG/1
2 INHALANT RESPIRATORY (INHALATION) EVERY 6 HOURS PRN
Status: DISCONTINUED | OUTPATIENT
Start: 2020-11-17 | End: 2020-11-22 | Stop reason: HOSPADM

## 2020-11-17 RX ORDER — ALLOPURINOL 100 MG/1
300 TABLET ORAL DAILY
Status: DISCONTINUED | OUTPATIENT
Start: 2020-11-18 | End: 2020-11-22 | Stop reason: HOSPADM

## 2020-11-17 RX ORDER — DEXTROSE 40 %
15-30 GEL (GRAM) ORAL AS NEEDED
Status: DISCONTINUED | OUTPATIENT
Start: 2020-11-17 | End: 2020-11-22 | Stop reason: HOSPADM

## 2020-11-17 RX ORDER — METOPROLOL TARTRATE 25 MG/1
25 TABLET, FILM COATED ORAL 2 TIMES DAILY
Status: DISCONTINUED | OUTPATIENT
Start: 2020-11-17 | End: 2020-11-22 | Stop reason: HOSPADM

## 2020-11-17 RX ORDER — ASPIRIN 81 MG/1
81 TABLET ORAL DAILY
COMMUNITY

## 2020-11-17 RX ORDER — LANOLIN ALCOHOL/MO/W.PET/CERES
1000 CREAM (GRAM) TOPICAL DAILY
COMMUNITY

## 2020-11-17 RX ORDER — POTASSIUM CHLORIDE 750 MG/1
20 TABLET, FILM COATED, EXTENDED RELEASE ORAL 2 TIMES DAILY
Status: DISCONTINUED | OUTPATIENT
Start: 2020-11-17 | End: 2020-11-22 | Stop reason: HOSPADM

## 2020-11-17 RX ORDER — GABAPENTIN 300 MG/1
300 CAPSULE ORAL 2 TIMES DAILY
Status: DISCONTINUED | OUTPATIENT
Start: 2020-11-17 | End: 2020-11-22 | Stop reason: HOSPADM

## 2020-11-17 RX ORDER — AMLODIPINE AND BENAZEPRIL HYDROCHLORIDE 5; 20 MG/1; MG/1
1 CAPSULE ORAL DAILY
COMMUNITY

## 2020-11-17 RX ORDER — CHOLECALCIFEROL (VITAMIN D3) 25 MCG
5000 TABLET ORAL DAILY
Status: DISCONTINUED | OUTPATIENT
Start: 2020-11-18 | End: 2020-11-17 | Stop reason: SDUPTHER

## 2020-11-17 RX ORDER — ROSUVASTATIN CALCIUM 20 MG/1
20 TABLET, COATED ORAL
Status: DISCONTINUED | OUTPATIENT
Start: 2020-11-17 | End: 2020-11-22 | Stop reason: HOSPADM

## 2020-11-17 RX ADMIN — POTASSIUM CHLORIDE 20 MEQ: 750 TABLET, EXTENDED RELEASE ORAL at 19:49

## 2020-11-17 RX ADMIN — ASPIRIN 81 MG: 81 TABLET, COATED ORAL at 19:49

## 2020-11-17 RX ADMIN — METOPROLOL TARTRATE 25 MG: 25 TABLET, FILM COATED ORAL at 19:48

## 2020-11-17 RX ADMIN — ROSUVASTATIN CALCIUM 20 MG: 20 TABLET, FILM COATED ORAL at 19:49

## 2020-11-17 RX ADMIN — CYANOCOBALAMIN TAB 1000 MCG 1000 MCG: 1000 TAB at 19:49

## 2020-11-17 RX ADMIN — SODIUM CHLORIDE: 9 INJECTION, SOLUTION INTRAVENOUS at 18:03

## 2020-11-17 RX ADMIN — GABAPENTIN 300 MG: 300 CAPSULE ORAL at 19:48

## 2020-11-17 ASSESSMENT — ENCOUNTER SYMPTOMS
HEADACHES: 0
BRUISES/BLEEDS EASILY: 0
CONFUSION: 0
DIFFICULTY URINATING: 0
CONSTIPATION: 0
VOMITING: 0
NAUSEA: 0
FEVER: 0
SORE THROAT: 1
COUGH: 1
ABDOMINAL PAIN: 0
SHORTNESS OF BREATH: 1
DIARRHEA: 1

## 2020-11-17 ASSESSMENT — COGNITIVE AND FUNCTIONAL STATUS - GENERAL
DRESSING REGULAR LOWER BODY CLOTHING: 4 - NONE
STANDING UP FROM CHAIR USING ARMS: 4 - NONE
MOVING TO AND FROM BED TO CHAIR: 4 - NONE
HELP NEEDED FOR PERSONAL GROOMING: 4 - NONE
CLIMB 3 TO 5 STEPS WITH RAILING: 4 - NONE
HELP NEEDED FOR BATHING: 4 - NONE
EATING MEALS: 4 - NONE
WALKING IN HOSPITAL ROOM: 4 - NONE
TOILETING: 4 - NONE
DRESSING REGULAR UPPER BODY CLOTHING: 4 - NONE

## 2020-11-17 NOTE — PLAN OF CARE
Plan of Care Review  Plan of Care Reviewed With: patient  Progress: improving  Outcome Summary: Pt received from ED, covid dx. Pt maintained on 2L NC, endorses DELCID. IVF initiated. Admission profile and assessment complete. Oriented to room. Alarms set and call bell within reach. Will continue to monitor.

## 2020-11-17 NOTE — ED PROVIDER NOTES
HPI     70-year-old male presents to the emergency department via private vehicle with complaints of shortness of breath, fatigue, slight sore throat, coughing with yellow sputum, changes to his taste, loose orangey stool, generalized weakness.  Denies any fevers, headache, nasal congestion, chest pain, lower extremity edema, history of CHF, lung disease history, abdominal pain, nausea, vomiting, dysuria. covid-10 exposure 11/2.     History reviewed. No pertinent past medical history.    History reviewed. No pertinent surgical history.    Allergies   Allergen Reactions   • Heparin        Social History     Tobacco Use   Smoking Status Never Smoker   Smokeless Tobacco Never Used       Social History     Substance and Sexual Activity   Alcohol Use Not Currently       Social History     Substance and Sexual Activity   Drug Use Not Currently       No LMP for male patient.    Review of Systems   Constitutional: Negative for fever.   HENT: Positive for sore throat. Negative for congestion.    Respiratory: Positive for cough and shortness of breath.    Cardiovascular: Negative for chest pain.   Gastrointestinal: Positive for diarrhea. Negative for abdominal pain, constipation, nausea and vomiting.   Genitourinary: Negative for difficulty urinating.   Musculoskeletal: Negative for gait problem.   Skin: Negative for pallor.   Allergic/Immunologic: Negative for immunocompromised state.   Neurological: Negative for headaches.   Hematological: Does not bruise/bleed easily.   Psychiatric/Behavioral: Negative for confusion.       Vitals:    11/17/20 1323 11/17/20 1413 11/17/20 1507   BP: (!) 151/77 (!) 148/86 (!) 157/79   BP Location: Right upper arm Right forearm    Patient Position: Sitting Lying    Pulse: 83 81 81   Resp: 20 18 (!) 30   Temp: 36.2 °C (97.1 °F)  (!) 36 °C (96.8 °F)   TempSrc:   Oral   SpO2: (!) 91% 95% (!) 91%       Physical Exam  Vitals signs and nursing note reviewed.   Constitutional:       Appearance: He is  well-developed.   HENT:      Head: Normocephalic and atraumatic.      Mouth/Throat:      Pharynx: Oropharynx is clear.   Neck:      Musculoskeletal: Neck supple.   Cardiovascular:      Rate and Rhythm: Normal rate and regular rhythm.   Pulmonary:      Effort: Pulmonary effort is normal.      Breath sounds: Normal breath sounds.      Comments: Ambulatory pulse ox 90% on room air.  Musculoskeletal: Normal range of motion.      Right lower leg: He exhibits no tenderness. No edema.      Left lower leg: He exhibits no tenderness. No edema.   Skin:     General: Skin is warm and dry.      Capillary Refill: Capillary refill takes less than 2 seconds.   Neurological:      General: No focal deficit present.      Mental Status: He is alert and oriented to person, place, and time.      GCS: GCS eye subscore is 4. GCS verbal subscore is 5. GCS motor subscore is 6.      Gait: Gait is intact.   Psychiatric:         Mood and Affect: Mood normal.         Behavior: Behavior normal.         Procedures    ED Course as of Nov 17 1530   Tue Nov 17, 2020   1357 Impression: Shortness of breath, changes to taste, sore throat, cough that is improving, shortness of breath.  Fatigue.    Plan: CBC, CMP, magnesium, troponin, Covid-19 testing, chest x-ray, EKG.  Ambulatory pulse ox on room air was 90%.  Currently on 2 L/min O2 via nasal cannula.    [AO]   1419 CXR: IMPRESSION:     Scattered patchy opacities suspicious for infectious/inflammatory process including that which can be seen with Covid 19 infection.  Follow-up is recommended with PA and lateral view to document resolution.    [AO]   1419 On 2L/min O2 via NC.    SpO2: 95 % [AO]   1457 WBC: 5.89 [AO]   1457 Unknown prior.   Creatinine(!): 1.4 [AO]   1502 Troponin I: 0.02 [AO]   1502 Patient updated on all results.  Patient in agreement with admission.  Discussed CODE STATUS and patient states he would like compressions but not intubations.  [facility] paged for admission.    [AO]   1519 Patient  updated.  RN aware.   SARS-CoV-2 (COVID-19)(!!): Positive [AO]   1522 Spoke to [facility]. Will admit to tele.     [AO]      ED Course User Index  [AO] Keisha Rose CRNP         Clinical Impressions as of Nov 17 1530   Shortness of breath   Fatigue, unspecified type   Hypoxia   COVID-19       Final diagnoses:   [R06.02] Shortness of breath   [R53.83] Fatigue, unspecified type   [R09.02] Hypoxia   [U07.1] COVID-19       Labs Reviewed   SARS-COV-2 (COVID-19)/ FLU A/B, AND RSV, PCR - Abnormal       Result Value    SARS-CoV-2 (COVID-19) Positive (*)     Influenza A Negative      Influenza B Negative      Respiratory Syncytial Virus Negative     CBC AND DIFF - Abnormal    WBC 5.89      RBC 5.02      Hemoglobin 11.3 (*)     Hematocrit 38.2 (*)     MCV 76.1 (*)     MCH 22.5 (*)     MCHC 29.6 (*)     RDW 18.7 (*)     Platelets 329      MPV 10.9      Differential Type Auto      nRBC 0.0      Immature Granulocytes 0.8      Neutrophils 81.0      Lymphocytes 12.2      Monocytes 5.4      Eosinophils 0.3      Basophils 0.3      Immature Granulocytes, Absolute 0.05      Neutrophils, Absolute 4.76      Lymphocytes, Absolute 0.72 (*)     Monocytes, Absolute 0.32      Eosinophils, Absolute 0.02 (*)     Basophils, Absolute 0.02     COMPREHENSIVE METABOLIC PANEL - Abnormal    Sodium 135 (*)     Potassium 3.3 (*)     Chloride 101      CO2 22      BUN 27 (*)     Creatinine 1.4 (*)     Glucose 126 (*)     Calcium 8.6 (*)     AST (SGOT) 35      ALT (SGPT) 21      Alkaline Phosphatase 67      Total Protein 7.5      Albumin 3.0 (*)     Bilirubin, Total 1.3 (*)     eGFR 50.4 (*)     Anion Gap 12     TROPONIN I - Normal    Troponin I 0.02     MAGNESIUM - Normal    Magnesium 1.9     LACTATE, VENOUS - Normal    Lactate 1.9     SARS-COV-2 (COVID 19), PCR    Narrative:     The following orders were created for panel order SARS-CoV-2 (COVID-19), PCR Nasopharynx.  Procedure                               Abnormality         Status                      ---------                               -----------         ------                     SARS-COV-2 (COVID-19)/ F...[[id number]]  Abnormal            Final result                 Please view results for these tests on the individual orders.   BLOOD CULTURE   BLOOD CULTURE   RAINBOW DRAW PANEL    Narrative:     The following orders were created for panel order [facility] Draw Panel.  Procedure                               Abnormality         Status                     ---------                               -----------         ------                     RAINBOW RED[[id number]]                                      In process                 RAINBOW LT BLUE[[id number]]                                  In process                 RAINBOW GOLD[[id number]]                                     In process                   Please view results for these tests on the individual orders.   RAINBOW RED   RAINBOW LT BLUE   RAINBOW GOLD       ECG 12 lead   ED Interpretation   Interpreted with ED attending.   Rhythm: Normal Sinus.  Rate: 80 bpm.  P wave interval: normal interval.  QRS: normal QRS.  QTc: Prolonged at 493.    ST Segments: no obvious ST elevation.    Rhythm Strip: NSR.    O2 sat: 90%, low on room air but then placed on 2 L/min of O2 via nasal cannula and 97% and normal.              X-RAY CHEST 1 VIEW   Final Result   IMPRESSION:      Scattered patchy opacities suspicious for infectious/inflammatory process   including that which can be seen with Covid 19 infection.  Follow-up is   recommended with PA and lateral view to document resolution.                   Keisha Rose CRNP  11/17/20 1358       Keisha Rose CRNP  11/17/20 1512       Keisha Rose CRNP  11/17/20 1519       Keisha Rose CRNP  11/17/20 1523       Keisha Rose CRNP  11/17/20 1530

## 2020-11-17 NOTE — H&P
Hospital Medicine Service -  History & Physical        CHIEF COMPLAINT   Patient presents with a chief complaint of sob     HISTORY OF PRESENT ILLNESS      Amari Gan is a 70 y.o. male with a past medical history of CAD, gout, hypertension, hyperlipidemia, diabetes who presents with a cc of shortness of breath.  Patient reports he had exposure to Covid positive coworker approximately 2 weeks ago recently discovering that they were positive.  Patient has a approximately 4-day history of shortness of breath with worsening fatigue.  Patient also has associated fevers, chills, diarrhea.  Denies having any myalgias, arthralgias, anosmia, ageusia.     In the ED the patient was found to be saturating at 91% on room air, also tachypneic.  Chest x-ray was consistent for Covid pneumonia.    PAST MEDICAL AND SURGICAL HISTORY      History reviewed. No pertinent past medical history.    History reviewed. No pertinent surgical history.    PCP: Gucci Castillo MD    MEDICATIONS      Prior to Admission medications    Not on File       ALLERGIES      Heparin    FAMILY HISTORY      History reviewed. No pertinent family history.    SOCIAL HISTORY      Social History     Socioeconomic History   • Marital status:      Spouse name: None   • Number of children: None   • Years of education: None   • Highest education level: None   Occupational History   • None   Social Needs   • Financial resource strain: None   • Food insecurity     Worry: None     Inability: None   • Transportation needs     Medical: None     Non-medical: None   Tobacco Use   • Smoking status: Never Smoker   • Smokeless tobacco: Never Used   Substance and Sexual Activity   • Alcohol use: Not Currently   • Drug use: Not Currently   • Sexual activity: None   Lifestyle   • Physical activity     Days per week: None     Minutes per session: None   • Stress: None   Relationships   • Social connections     Talks on phone: None     Gets together: None      Attends [employer] service: None     Active member of club or organization: None     Attends meetings of clubs or organizations: None     Relationship status: None   • Intimate partner violence     Fear of current or ex partner: None     Emotionally abused: None     Physically abused: None     Forced sexual activity: None   Other Topics Concern   • None   Social History Narrative   • None       REVIEW OF SYSTEMS      Const: (+) fevers, chills   Eyes: (-)blurry vision, double vision  ENMT (-) sore throat, runny nose  Cardio: (-) chest pain, edema, palpitations,   Pulm: (+) cough, sob, (-) wheezing  GI: (-) melena, nausea, vomiting, abdominal pain (+) diarrhea  : (-) dysuria, flank pain  Neuro: (+) weakness (-) numbness, parasthesias    PHYSICAL EXAMINATION      Temp:  [36 °C (96.8 °F)-36.2 °C (97.1 °F)] 36 °C (96.8 °F)  Heart Rate:  [80-83] 80  Resp:  [18-30] 30  BP: (142-157)/(77-86) 142/77  There is no height or weight on file to calculate BMI.    Constitutional:  no acute distress, well developed  Eyes:  EOMI, non-icteric   ENMT:  moist mucous membranes, no JVD  CV:  RRR, no murmurs detected  Pulm:  normal air entry, rales present  GI:  Bowel sounds are normal, soft, nondistended, nontender  MSK:  no cyanosis, clubbing, edema  Neuro:  awake, alert, orientedx3; sensation grossly normal    LABS / IMAGING / EKG        Labs  CBC:  Results from last 7 days   Lab Units 11/17/20  1411   WBC K/uL 5.89   HEMOGLOBIN g/dL 11.3*   HEMATOCRIT % 38.2*   PLATELETS K/uL 329   DIFF TYPE  Auto   NRBC % 0.0   IMM GRANULOCYTES % 0.8   NEUTROPHILS % 81.0   LYMPHOCYTES % 12.2   MONOCYTES % 5.4   EOSINOPHILS % 0.3   BASOPHILS % 0.3   IMM GRANUCOCYTES ABS K/uL 0.05   MONO ABS AUTO K/uL 0.32   EOS ABS AUTO K/uL 0.02*   BASO ABS AUTO K/uL 0.02        BMP:  Results from last 7 days   Lab Units 11/17/20  1411   SODIUM mEQ/L 135*   POTASSIUM mEQ/L 3.3*   CHLORIDE mEQ/L 101   CO2 mEQ/L 22   BUN mg/dL 27*   CREATININE mg/dL 1.4*   CALCIUM  mg/dL 8.6*   ALBUMIN g/dL 3.0*   BILIRUBIN TOTAL mg/dL 1.3*   ALK PHOS IU/L 67   ALT IU/L 21   AST IU/L 35   GLUCOSE mg/dL 126*       TROPONIN  No results found for: TROPONINT    COAG:        Imaging  X-ray Chest 1 View    Result Date: 11/17/2020  Narrative: CLINICAL HISTORY: SOB, exposed to covid COMPARISON:  None. COMMENT: Technique: Frontal view chest x-ray. There are scattered patchy opacities more so peripherally.  There is no dense consolidation, edema or effusion.  There is no evidence for large effusion. The cardiac silhouette is enlarged.     Impression: IMPRESSION: Scattered patchy opacities suspicious for infectious/inflammatory process including that which can be seen with Covid 19 infection.  Follow-up is recommended with PA and lateral view to document resolution.       ECG/Telemetry  NSR    ASSESSMENT AND PLAN           Obesity (BMI 35.0-39.9 without comorbidity)  Assessment & Plan  .    HLD (hyperlipidemia)  Assessment & Plan  During home statin    Diabetes (CMS/[facility])  Assessment & Plan  Insulin sliding scale  POCG  Holding oral hypoglycemic agents    CAD (coronary artery disease)  Assessment & Plan  Continue home cardiac regimen, aspirin, statin, beta-blocker    HTN (hypertension)  Assessment & Plan  Continue home antihypertensives    Gout  Assessment & Plan  Continue home allopurinol    PRATIK (acute kidney injury) (CMS/[facility])  Assessment & Plan  No reported hx of ckd  Poor po intake  IVF  Hold hctz for today  Trend      Hypokalemia  Assessment & Plan  Replete 20mg oral  Bmp tomorrow    COVID-19  Assessment & Plan  Known + contact: Coworker  Sx: sob, cough, chills, nausea, diarrhea  Cxr: consistent with CoVid pneumonia  CoVid test: positive    Admit to tele  Continuous pulse ox  Continue CoVid protocol   Continue bronchodilators prn   Encouraged to self prone if able to tolerate  O2 to maintain SpO2>90%  ID consulted         VTE Assessment: Padua    VTE Prophylaxis Plan: arixtra  Code Status: No  Order  Estimated Discharge Date:   Disposition Planning: admit to tele     Xander Glynn MD  11/17/2020

## 2020-11-17 NOTE — ASSESSMENT & PLAN NOTE
Known + contact: Coworker  Sx: sob, cough, chills, nausea, diarrhea  Cxr: consistent with CoVid pneumonia  CoVid test: positive    Admit to tele  Continuous pulse ox  Continue CoVid protocol   Continue bronchodilators prn   Encouraged to self prone if able to tolerate  O2 to maintain SpO2>90%  ID consulted

## 2020-11-17 NOTE — ED ATTESTATION NOTE
I have personally seen and examined the patient.  I reviewed and agree with physician assistant / nurse practitioner’s assessment and plan of care, with the following exceptions: None  My examination, assessment, and plan of care of  Amari Gan is as follows:       Vital Signs Review: Vital signs have been reviewed. The oxygen saturation is  SpO2: (!) 91 % which is  Hypoxic.    The patient is a 70-year-old male with no significant past medical history, who presented to the emergency department for evaluation of dyspnea.  The patient states he had exposure to a Covid positive individual approximately 2 weeks ago.  The patient states he had no symptoms until approximately 4 days ago when he had change in his taste.  He has had dyspnea, fatigue, and nonproductive cough today.  He denies chest pain, abdominal pain, nausea, vomiting, diarrhea, lower extremity pain, edema, or rash.  Patient has dyspnea with minimal exertion.    Physical exam: Vital signs reviewed.  Hypoxia is present.  Tachypnea is present.  General: Patient appears comfortable sitting up in bed.  Patient is in mild respiratory distress with tachypnea.  HEENT: NCAT, EOMI, MMM.  Neck: Supple, nontender, full range of motion.  No JVD.  Chest: Lungs with occasional bilateral rhonchi present.  No wheezing.  Tachypnea is present.  Mild respiratory distress.  Good air entry bilaterally.  Heart: Regular rate and rhythm no murmur.  Abdomen: Soft, nontender, nondistended, obese, no guarding, no rebound.  Extremities: No cyanosis, clubbing, edema, or calf tenderness.  Skin: Warm and dry, no rash.    Plan: Check labs, EKG, chest x-ray.    Labs Reviewed   SARS-COV-2 (COVID-19)/ FLU A/B, AND RSV, PCR - Abnormal       Result Value    SARS-CoV-2 (COVID-19) Positive (*)     Influenza A Negative      Influenza B Negative      Respiratory Syncytial Virus Negative     CBC AND DIFF - Abnormal    WBC 5.89      RBC 5.02      Hemoglobin 11.3 (*)     Hematocrit 38.2 (*)      MCV 76.1 (*)     MCH 22.5 (*)     MCHC 29.6 (*)     RDW 18.7 (*)     Platelets 329      MPV 10.9      Differential Type Auto      nRBC 0.0      Immature Granulocytes 0.8      Neutrophils 81.0      Lymphocytes 12.2      Monocytes 5.4      Eosinophils 0.3      Basophils 0.3      Immature Granulocytes, Absolute 0.05      Neutrophils, Absolute 4.76      Lymphocytes, Absolute 0.72 (*)     Monocytes, Absolute 0.32      Eosinophils, Absolute 0.02 (*)     Basophils, Absolute 0.02     COMPREHENSIVE METABOLIC PANEL - Abnormal    Sodium 135 (*)     Potassium 3.3 (*)     Chloride 101      CO2 22      BUN 27 (*)     Creatinine 1.4 (*)     Glucose 126 (*)     Calcium 8.6 (*)     AST (SGOT) 35      ALT (SGPT) 21      Alkaline Phosphatase 67      Total Protein 7.5      Albumin 3.0 (*)     Bilirubin, Total 1.3 (*)     eGFR 50.4 (*)     Anion Gap 12     TROPONIN I - Normal    Troponin I 0.02     MAGNESIUM - Normal    Magnesium 1.9     LACTATE, VENOUS - Normal    Lactate 1.9     SARS-COV-2 (COVID 19), PCR    Narrative:     The following orders were created for panel order SARS-CoV-2 (COVID-19), PCR Nasopharynx.  Procedure                               Abnormality         Status                     ---------                               -----------         ------                     SARS-COV-2 (COVID-19)/ F...[[id number]]  Abnormal            Final result                 Please view results for these tests on the individual orders.   BLOOD CULTURE   BLOOD CULTURE   RAINBOW DRAW PANEL    Narrative:     The following orders were created for panel order [place] Draw Panel.  Procedure                               Abnormality         Status                     ---------                               -----------         ------                     RAINBOW RED[[id number]]                                      In process                 Ad Dynamo LT BLUE[[id number]]                                  In process                 Ad Dynamo  KASSY[[id number]]                                     In process                   Please view results for these tests on the individual orders.   RAINBOW RED   ABIOLA LT BLUE   RAINBOW GOLD     ECG 12 lead   ED Interpretation   Interpreted with ED attending.   Rhythm: Normal Sinus.  Rate: 80 bpm.  P wave interval: normal interval.  QRS: normal QRS.  QTc: Prolonged at 493.    ST Segments: no obvious ST elevation.    Rhythm Strip: NSR.    O2 sat: 90%, low on room air but then placed on 2 L/min of O2 via nasal cannula and 97% and normal.              X-RAY CHEST 1 VIEW   Final Result   IMPRESSION:      Scattered patchy opacities suspicious for infectious/inflammatory process   including that which can be seen with Covid 19 infection.  Follow-up is   recommended with PA and lateral view to document resolution.                      Patient test results are reviewed.  Patient was found to be COVID-19 positive.  No evidence of ACS/MI.  Plan hospitalize patient.    Critical Care  Performed by: Reji Oden MD  Authorized by: Reji Oden MD     Critical care provider statement:     Critical care time (minutes):  60    Critical care time was exclusive of:  Separately billable procedures and treating other patients    Critical care was necessary to treat or prevent imminent or life-threatening deterioration of the following conditions:  Respiratory failure    Critical care was time spent personally by me on the following activities:  Ordering and performing treatments and interventions, ordering and review of laboratory studies, ordering and review of radiographic studies, pulse oximetry, re-evaluation of patient's condition, obtaining history from patient or surrogate, examination of patient, discussions with consultants and development of treatment plan with patient or surrogate    I assumed direction of critical care for this patient from another provider in my specialty: no            Impression: Acute  COVID-19 infection.  Acute respiratory failure with hypoxia.             I was physically present for the key/critical portions of the following procedures: None     Reji Oden MD  11/17/20 2638

## 2020-11-18 LAB
ALBUMIN SERPL-MCNC: 2.7 G/DL (ref 3.4–5)
ALP SERPL-CCNC: 63 IU/L (ref 35–126)
ALT SERPL-CCNC: 20 IU/L (ref 16–63)
ANION GAP SERPL CALC-SCNC: 10 MEQ/L (ref 3–15)
AST SERPL-CCNC: 37 IU/L (ref 15–41)
ATRIAL RATE: 80
BASOPHILS # BLD: 0.02 K/UL (ref 0.01–0.1)
BASOPHILS NFR BLD: 0.3 %
BILIRUB SERPL-MCNC: 1.5 MG/DL (ref 0.3–1.2)
BUN SERPL-MCNC: 25 MG/DL (ref 8–20)
CALCIUM SERPL-MCNC: 8.6 MG/DL (ref 8.9–10.3)
CHLORIDE SERPL-SCNC: 104 MEQ/L (ref 98–109)
CO2 SERPL-SCNC: 23 MEQ/L (ref 22–32)
CREAT SERPL-MCNC: 1.1 MG/DL (ref 0.8–1.3)
DIFFERENTIAL METHOD BLD: ABNORMAL
EOSINOPHIL # BLD: 0.06 K/UL (ref 0.04–0.54)
EOSINOPHIL NFR BLD: 1 %
ERYTHROCYTE [DISTWIDTH] IN BLOOD BY AUTOMATED COUNT: 18.6 % (ref 11.6–14.4)
GFR SERPL CREATININE-BSD FRML MDRD: >60 ML/MIN/1.73M*2
GLUCOSE BLD-MCNC: 113 MG/DL (ref 70–99)
GLUCOSE BLD-MCNC: 157 MG/DL (ref 70–99)
GLUCOSE SERPL-MCNC: 181 MG/DL (ref 70–99)
HCT VFR BLDCO AUTO: 35.6 % (ref 40.1–51)
HGB BLD-MCNC: 10.4 G/DL (ref 13.7–17.5)
IMM GRANULOCYTES # BLD AUTO: 0.05 K/UL (ref 0–0.08)
IMM GRANULOCYTES NFR BLD AUTO: 0.8 %
LYMPHOCYTES # BLD: 0.86 K/UL (ref 1.2–3.5)
LYMPHOCYTES NFR BLD: 14.1 %
MCH RBC QN AUTO: 22.6 PG (ref 28–33.2)
MCHC RBC AUTO-ENTMCNC: 29.2 G/DL (ref 32.2–36.5)
MCV RBC AUTO: 77.2 FL (ref 83–98)
MONOCYTES # BLD: 0.26 K/UL (ref 0.3–1)
MONOCYTES NFR BLD: 4.3 %
NEUTROPHILS # BLD: 4.84 K/UL (ref 1.7–7)
NEUTS SEG NFR BLD: 79.5 %
NRBC BLD-RTO: 0.3 %
P AXIS: 9
PDW BLD AUTO: 10.6 FL (ref 9.4–12.4)
PLATELET # BLD AUTO: 327 K/UL (ref 150–350)
POCT TEST: ABNORMAL
POCT TEST: ABNORMAL
POTASSIUM SERPL-SCNC: 4 MEQ/L (ref 3.6–5.1)
PR INTERVAL: 168
PROT SERPL-MCNC: 6.3 G/DL (ref 6–8.2)
QRS DURATION: 98
QT INTERVAL: 428
QTC CALCULATION(BAZETT): 493
R AXIS: -13
RBC # BLD AUTO: 4.61 M/UL (ref 4.5–5.8)
SODIUM SERPL-SCNC: 137 MEQ/L (ref 136–144)
T WAVE AXIS: 20
VENTRICULAR RATE: 80
WBC # BLD AUTO: 6.09 K/UL (ref 3.8–10.5)

## 2020-11-18 PROCEDURE — 85025 COMPLETE CBC W/AUTO DIFF WBC: CPT | Performed by: STUDENT IN AN ORGANIZED HEALTH CARE EDUCATION/TRAINING PROGRAM

## 2020-11-18 PROCEDURE — 99233 SBSQ HOSP IP/OBS HIGH 50: CPT | Mod: CR | Performed by: HOSPITALIST

## 2020-11-18 PROCEDURE — 82310 ASSAY OF CALCIUM: CPT | Performed by: STUDENT IN AN ORGANIZED HEALTH CARE EDUCATION/TRAINING PROGRAM

## 2020-11-18 PROCEDURE — 63700000 HC SELF-ADMINISTRABLE DRUG: Performed by: HOSPITALIST

## 2020-11-18 PROCEDURE — 12000000 HC ROOM AND CARE MED/SURG

## 2020-11-18 PROCEDURE — 63700000 HC SELF-ADMINISTRABLE DRUG: Performed by: STUDENT IN AN ORGANIZED HEALTH CARE EDUCATION/TRAINING PROGRAM

## 2020-11-18 PROCEDURE — 25800000 HC PHARMACY IV SOLUTIONS: Performed by: INTERNAL MEDICINE

## 2020-11-18 PROCEDURE — 63600000 HC DRUGS/DETAIL CODE: Mod: JW | Performed by: INTERNAL MEDICINE

## 2020-11-18 RX ORDER — DEXAMETHASONE SODIUM PHOSPHATE 4 MG/ML
6 INJECTION, SOLUTION INTRA-ARTICULAR; INTRALESIONAL; INTRAMUSCULAR; INTRAVENOUS; SOFT TISSUE
Status: DISCONTINUED | OUTPATIENT
Start: 2020-11-18 | End: 2020-11-22 | Stop reason: HOSPADM

## 2020-11-18 RX ORDER — GUAIFENESIN 600 MG/1
600 TABLET, EXTENDED RELEASE ORAL 2 TIMES DAILY
Status: DISCONTINUED | OUTPATIENT
Start: 2020-11-18 | End: 2020-11-22 | Stop reason: HOSPADM

## 2020-11-18 RX ORDER — HYDRALAZINE HYDROCHLORIDE 50 MG/1
25 TABLET, FILM COATED ORAL EVERY 8 HOURS
Status: DISCONTINUED | OUTPATIENT
Start: 2020-11-18 | End: 2020-11-22 | Stop reason: HOSPADM

## 2020-11-18 RX ADMIN — GUAIFENESIN 600 MG: 600 TABLET ORAL at 13:39

## 2020-11-18 RX ADMIN — METOPROLOL TARTRATE 25 MG: 25 TABLET, FILM COATED ORAL at 20:55

## 2020-11-18 RX ADMIN — POTASSIUM CHLORIDE 20 MEQ: 750 TABLET, EXTENDED RELEASE ORAL at 08:41

## 2020-11-18 RX ADMIN — GABAPENTIN 300 MG: 300 CAPSULE ORAL at 08:41

## 2020-11-18 RX ADMIN — DEXAMETHASONE SODIUM PHOSPHATE 6 MG: 4 INJECTION, SOLUTION INTRA-ARTICULAR; INTRALESIONAL; INTRAMUSCULAR; INTRAVENOUS; SOFT TISSUE at 13:39

## 2020-11-18 RX ADMIN — METOPROLOL TARTRATE 25 MG: 25 TABLET, FILM COATED ORAL at 08:41

## 2020-11-18 RX ADMIN — GUAIFENESIN 600 MG: 600 TABLET ORAL at 20:55

## 2020-11-18 RX ADMIN — GABAPENTIN 300 MG: 300 CAPSULE ORAL at 20:55

## 2020-11-18 RX ADMIN — POTASSIUM CHLORIDE 20 MEQ: 750 TABLET, EXTENDED RELEASE ORAL at 20:55

## 2020-11-18 RX ADMIN — ALLOPURINOL 300 MG: 100 TABLET ORAL at 08:40

## 2020-11-18 RX ADMIN — SODIUM CHLORIDE 200 MG: 0.9 INJECTION, SOLUTION INTRAVENOUS at 13:43

## 2020-11-18 RX ADMIN — HYDRALAZINE HYDROCHLORIDE 25 MG: 50 TABLET ORAL at 13:43

## 2020-11-18 RX ADMIN — LISINOPRIL: 20 TABLET ORAL at 08:40

## 2020-11-18 RX ADMIN — HYDRALAZINE HYDROCHLORIDE 25 MG: 50 TABLET ORAL at 22:30

## 2020-11-18 RX ADMIN — CYANOCOBALAMIN TAB 1000 MCG 1000 MCG: 1000 TAB at 08:41

## 2020-11-18 RX ADMIN — ROSUVASTATIN CALCIUM 20 MG: 20 TABLET, FILM COATED ORAL at 17:35

## 2020-11-18 RX ADMIN — ASPIRIN 81 MG: 81 TABLET, COATED ORAL at 08:41

## 2020-11-18 RX ADMIN — Medication 125 MCG: at 08:41

## 2020-11-18 NOTE — ASSESSMENT & PLAN NOTE
Known + contact: Coworker  Sx: sob, cough, chills, nausea, diarrhea  Cxr: consistent with CoVid pneumonia  CoVid test: positive    Admit to tele  Continuous pulse ox  Continue CoVid protocol   Continue bronchodilators prn   Encouraged to self prone if able to tolerate  O2 to maintain SpO2>90%  ID consulted    11/18  CXR suggesting Covid PNA  Waiting for ID's consult and Treatment decision  Add Mucinex  Following O2 status.

## 2020-11-18 NOTE — CONSULTS
Clinical Course: Patient is a 70 y.o. male who was admitted on 11/17/2020 with a diagnosis of Shortness of breath [R06.02]  Hypoxia [R09.02]  Fatigue, unspecified type [R53.83]  COVID-19 [U07.1].   History reviewed. No pertinent past medical history.  History reviewed. No pertinent surgical history.    Nutrition Interventions/Recommendations:   1. Added Boost glucose control shake for 250kcals, 14 g PRO each  2. Treat labs/lytes  3. Enc po intake, assist with meal ordering prn   4. Continue 1800kcals CC, cardiac diet  5. Accucks ACHS with SSI, goal <140mg/dL          Dietary Orders   (From admission, onward)             Start     Ordered    11/18/20 1254  Dietary nutrition supplements  Once     Question Answer Comment   Select Supplement (PH): Boost Glucose Control Chocolate    Meal period Dinner        11/18/20 1253    11/17/20 1809  Adult Diet Regular; Consistent Carbohydrate 1800; Cardiac (Low Sodium/Low Fat); RD/LDN may adjust order  Diet effective now     Question Answer Comment   Diet Texture Regular    Diabetic Restrictions: Consistent Carbohydrate 1800    Other Restriction(s): Cardiac (Low Sodium/Low Fat)    Delegation of Authority. Diet orders written by PA/CRNPs may not be adjusted by RD/LDNs. RD/LDN may adjust order        11/17/20 1802                Reason for Assessment  Reason For Assessment: identified at risk by screening criteria, per organizational policy  Diagnosis: infection/sepsis    MST Nutrition Screen Tool  Has patient lost weight without trying?: 0-->Yes  If yes,how much weight has been lost?: 1-->2-13 pounds  Has patient been eating poorly due to decreased appetite?: 1-->Yes  MST Nutrition Screen Score: 2    Nutrition/Diet History  Typical Food/Fluid Intake: from home   Diet Prior to Admission: regular   Appetite Prior to Admission: Poor-0-25%  Intake (%): (ordered toast, fruit cup, milk for breakfast )  Functional Status: ambulatory  Food Allergies: (NKFA)  Factors Affecting  "Nutritional Intake: taste altered    Physical Findings  Overall Physical Appearance: (not seen )  Last Bowel Movement: 11/18/20  Skin: (no active wounds )    Last Bowel Movement: 11/18/20    Nutrition Order  Nutrition Order Review: meets nutritional requirements  Nutrition Order Comments: 1800kcals CC, cardiac     Anthropometrics  Height: 175.3 cm (5' 9\")           Current Weight  Weight Method: Bed scale  Weight: 114 kg (252 lb)    Ideal Body Weight (IBW)  Ideal Body Weight (IBW) (kg): 73.69  % Ideal Body Weight: 155.13    Usual Body Weight (UBW)  Usual Body Weight: 117 kg (258 lb)  % of Usual Body Weight Assessment: (2%)  Weight Loss: unintentional  Time Frame: 1 Week    Body Mass Index (BMI)  BMI (Calculated): 37.2  BMI (kg/m2): 37.29  BMI Assessment: BMI 35-39.9: obesity grade II                        Labs/Procedures/Meds  Lab Results Reviewed: reviewed, pertinent      Results from last 7 days   Lab Units 11/18/20  1218 11/17/20  1411   SODIUM mEQ/L 137 135*   POTASSIUM mEQ/L 4.0 3.3*   CHLORIDE mEQ/L 104 101   CO2 mEQ/L 23 22   BUN mg/dL 25* 27*   CREATININE mg/dL 1.1 1.4*   GLUCOSE mg/dL 181* 126*   CALCIUM mg/dL 8.6* 8.6*      Results from last 7 days   Lab Units 11/18/20  1218 11/17/20  1411   ALK PHOS IU/L 63 67   BILIRUBIN TOTAL mg/dL 1.5* 1.3*   ALBUMIN g/dL 2.7* 3.0*   ALT IU/L 20 21   AST IU/L 37 35          No results found for: HGBA1C  No results found for: OOAOTVEB40  Lab Results   Component Value Date    CALCIUM 8.6 (L) 11/18/2020     Results from last 7 days   Lab Units 11/18/20  1218 11/17/20  1411   WBC K/uL 6.09 5.89   HEMOGLOBIN g/dL 10.4* 11.3*   HEMATOCRIT % 35.6* 38.2*   PLATELETS K/uL 327 329               Results from last 7 days   Lab Units 11/17/20  1411   MAGNESIUM mg/dL 1.9          Diagnostic Tests/Procedures  Diagnostic Test/Procedure Reviewed: reviewed, pertinent    Medications  Pertinent Medications Reviewed: reviewed, pertinent  • allopurinoL  300 mg oral Daily   • " amLODIPine-lisinopril (LOTREL) 5-20 combo dose   oral Daily   • aspirin  81 mg oral Daily   • cholecalciferol (vitamin D3)  125 mcg oral Daily   • cyanocobalamin  1,000 mcg oral Daily   • dexamethasone  6 mg intravenous q24h INT   • gabapentin  300 mg oral BID   • guaiFENesin  600 mg oral BID   • hydrALAZINE  25 mg oral q8h DANIKA   • [Provider Managed Hold] hydrochlorothiazide  25 mg oral Daily   • insulin aspart U-100  3-5 Units subcutaneous TID with meals   • metoprolol tartrate  25 mg oral BID   • potassium chloride  20 mEq oral BID   • remdesivir IVPB - EUA  200 mg intravenous Once    Followed by   • [START ON 11/19/2020] remdesivir IVPB - EUA  100 mg intravenous q24h INT   • rosuvastatin  20 mg oral Daily (6p)                              Weights (last 7 days)     Date/Time   Weight    11/17/20 17:01:09   114 kg (252 lb)            Results for MOHINI GONZALES (MRN [id number]) as of 11/18/2020 12:55   Ref. Range 11/17/2020 18:10 11/17/2020 20:55 11/18/2020 08:39   POCT Bedside Glucose Latest Ref Range: 70 - 99 mg/dL 105 (H) 129 (H) 113 (H)       Clinical Comments: Chart rev'd, pt visited for mst score >/= 2. Pt with hypoxemia on admission, +Covid 19. Pt was exposed to Covid 2 weeks ago, began experiencing no taste 10 days after exposure. Spoke with pt on phone, reports eating <25% usual for past 1 week d/t no appetite. Reports UBW is 258#, current wt is 252#, wt loss 6# or 2% in past 1 week. Hx T2DM, accucks above. Pt had not eaten breakfast at time of visit, allowed me to order breakfast for him. Reports being weak, lethargic. BMI is 37, obese class II. Added Boost Glucose shake control for additional kcals, PRO.       Date: 11/18/20  Signature: OLGA Bourgeois   Per [facility] COVID pt visitation restrictions, RD not able to enter room.

## 2020-11-18 NOTE — CONSULTS
Infectious Disease Consultation Report    Patient Name: Amari Gan  MR#: [id number]  : 1950  Admission Date: 2020  Consult Date: 20 9:14 AM   Consultant: oRbbie Guerra MD  Referring Provider: Dr Glynn  Reason for Consult: covid-19  History of Present Illness   Amari Gan is a 70 y.o. man who was admitted to [facility] on 2020 with Covid19. He has a known exposure, through work, two weeks ago. He came to the ER because of increasing dyspnea over the past week. He has only a slight cough. He has had fever. He has no nausea or vomiting but has had a poor appetite and has had diarrhea. He has no chest pain. He has no headache. He has not noted a change in taste or smell.    Allergies:   Allergies   Allergen Reactions   • Heparin      Medical History: History reviewed. No pertinent past medical history.  Surgical History: History reviewed. No pertinent surgical history.  Social History     Tobacco Use   • Smoking status: Never Smoker   • Smokeless tobacco: Never Used   Substance Use Topics   • Alcohol use: Not Currently   • Drug use: Not Currently   No IVDA  Family History:    noncontributory    Review of Systems: all other systems reviewed and negative except as noted in the HPI    Medications:  Current IP Meds (From admission, onward)        Frequency     allopurinoL (ZYLOPRIM) tablet 300 mg      Daily     amLODIPine (NORVASC) 5 mg, lisinopriL (PRINIVIL) 20 mg for LOTREL 5-20      Daily     [Provider Managed Hold]  hydrochlorothiazide (HYDRODIURIL) tablet 25 mg     (Provider Managed Hold since 2020 at 1948 by Xander Glynn MD.Hold Reason: Other (see comment).Hold Comments: Ryan)    Daily     cholecalciferol (vitamin D3) tablet 5,000 Units  Status:  Discontinued      Daily     cholecalciferol (vitamin D3) capsule 125 mcg      Daily     insulin aspart U-100 (NovoLOG) pen 3-5 Units      3 times daily with meals     potassium chloride (KLOR-CON) tablet  "extended release 20 mEq      2 times daily     gabapentin (NEURONTIN) capsule 300 mg      2 times daily     metoprolol tartrate (LOPRESSOR) tablet 25 mg      2 times daily     aspirin enteric coated tablet 81 mg      Daily     rosuvastatin (CRESTOR) tablet 20 mg      Daily (6p)     cyanocobalamin (VITAMIN B12) tablet 1,000 mcg      Daily     sodium chloride 0.9 % infusion      Continuous     glucose chewable tablet 16-32 g of dextrose  (Hypoglycemia Treatment Protocol and Hyperglycemia Validation Protocol)      As needed     dextrose 40 % oral gel 15-30 g of dextrose  (Hypoglycemia Treatment Protocol and Hyperglycemia Validation Protocol)      As needed     glucagon (GLUCAGEN) injection 1 mg  (Hypoglycemia Treatment Protocol and Hyperglycemia Validation Protocol)      As needed     dextrose in water injection 12.5 g  (Hypoglycemia Treatment Protocol and Hyperglycemia Validation Protocol)      As needed     albuterol HFA (VENTOLIN HFA) 90 mcg/actuation inhaler 2 puff  (albuterol sulfate (Ventolin HFA) inhaler)      Every 6 hours PRN          Physical Examination:  Vital signs reviewed  Temp (24hrs), Av.3 °C (97.3 °F), Min:36 °C (96.8 °F), Max:36.5 °C (97.7 °F)    Visit Vitals  BP (!) 177/91 (BP Location: Left upper arm, Patient Position: Sitting)   Pulse 95   Temp 36.4 °C (97.6 °F) (Oral)   Resp 20   Ht 1.753 m (5' 9\")   Wt 114 kg (252 lb)   SpO2 94%   BMI 37.21 kg/m²     General: no acute distress  Neurologic: A&A Ox3 WHITEHEAD  Head: normocephalic, atraumatic  Eyes: conjunctivae normal without injection or discharge, no scleral icterus, pupils equal, round, and reactive to light, extraocular muscles intact, visual acuity intact  Ears: external ears normal, no discharge from canals  Nose: no external deformity  Oropharynx: moist mucous membranes with no lesions  Neck: normal range of motion, supple, with no tracheal deviation  Heart: regular rate, regular rhythm, normal S1, normal S2  Lungs: rare crackles  Abdomen: " soft, normal bowel sounds, no distension, no mass, no tenderness  Musculoskeletal: normal range of motion without deformity  Skin: warm and dry, no rash  Hematologic: no cervical lymphadenopathy   Extremities: no cyanosis, no edema    Labs:  CBC Results       11/17/20                          1411           WBC 5.89           RBC 5.02           HGB 11.3           HCT 38.2           MCV 76.1           MCH 22.5           MCHC 29.6                                  BMP Results       11/17/20                          1411                      K 3.3           Cl 101           CO2 22           Glucose 126           BUN 27           Creatinine 1.4           Calcium 8.6           Anion Gap 12           EGFR 50.4           Comment for K at 1411 on 11/17/20: Results obtained on plasma. Plasma Potassium values may be up to 0.4 mEQ/L less than serum values. The differences may be greater for patients with high platelet or white cell counts.      PT/PTT Results     No lab values to display.      UA Results     No lab values to display.      Lactate Results       11/17/20                          1504           Lactate 1.9                         Microbiology Results     Procedure Component Value Units Date/Time    SARS-CoV-2 (COVID-19), PCR Nasopharynx [[id number]]  (Abnormal) Collected: 11/17/20 1411    Specimen: Nasopharyngeal Swab from Nasopharynx Updated: 11/17/20 1518    Narrative:      The following orders were created for panel order SARS-CoV-2 (COVID-19), PCR Nasopharynx.  Procedure                               Abnormality         Status                     ---------                               -----------         ------                     SARS-COV-2 (COVID-19)/ F...[[id number]]  Abnormal            Final result                 Please view results for these tests on the individual orders.    SARS-COV-2 (COVID-19)/ FLU A/B, AND RSV, PCR Nasopharynx [[id number]]  (Abnormal) Collected: 11/17/20 1411     Specimen: Nasopharyngeal Swab from Nasopharynx Updated: 11/17/20 1518     SARS-CoV-2 (COVID-19) Positive     Influenza A Negative     Influenza B Negative     Respiratory Syncytial Virus Negative        Imaging: Independent review of admission CXR reveals patchy opacities bilaterally    Assessment:  1. Covid19 with hypoxemia  2. Acute kidney injury managed by primary service  3. DM managed by primary service    Plan:   remdesivir   dexamethasone   monitor symptom report, temperatures, exam    Thank you for the courtesy of the infectious diseases consultation request. Please contact me if you have any questions about this case.     Robbie Guerra MD  11/18/2020 9:14 AM

## 2020-11-18 NOTE — PROGRESS NOTES
Hospital Medicine Service -  Daily Progress Note       SUBJECTIVE     Interval History: No acute events overnight. Some wet cough. Using 2L O2 NC. Denied CP or Fever/chills. No SOB     OBJECTIVE        Vital signs in last 24 hours:  Temp:  [36 °C (96.8 °F)-36.5 °C (97.7 °F)] 36.4 °C (97.6 °F)  Heart Rate:  [72-95] 95  Resp:  [18-31] 20  BP: (142-189)/(72-91) 177/91  No intake/output data recorded.    PHYSICAL EXAMINATION        GEN: well-developed and well-nourished; not in acute distress  HEENT: normocephalic; atraumatic  NECK: no JVD; no bruits  CARDIO: regular rate and rhythm; no murmurs or rubs  RESP: clear to auscultation bilaterally; no rales, rhonchi, or wheezes  ABD: soft, non-distended, non-tender, normal bowel sounds  EXT: no cyanosis, clubbing, or edema  SKIN: clean, dry, warm, and intact  MUSCULOSKELETAL: no injury or deformity  NEURO: alert and oriented x 3; nonfocal  BEHAVIOR/EMOTIONAL: appropriate; cooperative     LABS / IMAGING / TELE        Labs  Lab Results   Component Value Date    WBC 5.89 11/17/2020    HGB 11.3 (L) 11/17/2020    HCT 38.2 (L) 11/17/2020    MCV 76.1 (L) 11/17/2020     11/17/2020     Lab Results   Component Value Date    GLUCOSE 126 (H) 11/17/2020    CALCIUM 8.6 (L) 11/17/2020     (L) 11/17/2020    K 3.3 (L) 11/17/2020    CO2 22 11/17/2020     11/17/2020    BUN 27 (H) 11/17/2020    CREATININE 1.4 (H) 11/17/2020     No results found for: INR, PROTIME    Imaging  X-ray Chest 1 View    Result Date: 11/17/2020  IMPRESSION: Scattered patchy opacities suspicious for infectious/inflammatory process including that which can be seen with Covid 19 infection.  Follow-up is recommended with PA and lateral view to document resolution.       ECG/Telemetry  I have independently reviewed the telemetry. No events for the last 24 hours.    ASSESSMENT AND PLAN      Obesity (BMI 35.0-39.9 without comorbidity)  Assessment & Plan  .    HLD (hyperlipidemia)  Assessment &  Plan  During home statin    Diabetes (CMS/[facility])  Assessment & Plan  Insulin sliding scale  POCG  Holding oral hypoglycemic agents    CAD (coronary artery disease)  Assessment & Plan  Continue home cardiac regimen, aspirin, statin, beta-blocker    HTN (hypertension)  Assessment & Plan  Continue home antihypertensives    11/18  Uncontrolled  Dc IVF  Add Hydralazin  Monitoring BP closely    Gout  Assessment & Plan  Continue home allopurinol    PRATIK (acute kidney injury) (CMS/[facility])  Assessment & Plan  No reported hx of ckd  Poor po intake  IVF  Hold hctz for today  Trend    11/18  Eating well, dc IVF    Hypokalemia  Assessment & Plan  Replete 20mg oral  Bmp tomorrow    COVID-19  Assessment & Plan  Known + contact: Coworker  Sx: sob, cough, chills, nausea, diarrhea  Cxr: consistent with CoVid pneumonia  CoVid test: positive    Admit to tele  Continuous pulse ox  Continue CoVid protocol   Continue bronchodilators prn   Encouraged to self prone if able to tolerate  O2 to maintain SpO2>90%  ID consulted    11/18  CXR suggesting Covid PNA  Waiting for ID's consult and Treatment decision  Add Mucinex  Following O2 status.       VTE Assessment: Padua    Code Status: Full Code  Estimated discharge date: 11/19/2020     Arturo Leal MD  11/18/2020  11:47 AM

## 2020-11-18 NOTE — ASSESSMENT & PLAN NOTE
Continue home antihypertensives    11/18  Uncontrolled  Dc IVF  Add Hydralazin  Monitoring BP closely

## 2020-11-18 NOTE — ASSESSMENT & PLAN NOTE
No reported hx of ckd  Poor po intake  IVF  Hold hctz for today  Trend    11/18  Eating well, dc IVF

## 2020-11-18 NOTE — PATIENT CARE CONFERENCE
Care Progression Rounds Note  Date: 11/18/2020  Time: 10:28 AM     Patient Name: Amari Gan     Medical Record Number: [id number]   YOB: 1950  Sex: Male      Room/Bed: 4016    Admitting Diagnosis: Shortness of breath [R06.02]  Hypoxia [R09.02]  Fatigue, unspecified type [R53.83]  COVID-19 [U07.1]   Admit Date/Time: 11/17/2020  1:24 PM    Primary Diagnosis: No Principal Problem: There is no principal problem currently on the Problem List. Please update the Problem List and refresh.  Principal Problem: No Principal Problem: There is no principal problem currently on the Problem List. Please update the Problem List and refresh.    GMLOS: pending  Anticipated Discharge Date: 11/19/2020    AM-PAC  Mobility Score: 24    Discharge Planning:       Barriers to Discharge:  Barriers to Discharge: Medical issues not resolved    Participants:  social work/services, , nursing

## 2020-11-18 NOTE — PLAN OF CARE
Plan of Care Review  Plan of Care Reviewed With: patient  Progress: improving  Outcome Summary: Pt supervision overnight, 2L NC continued. Pt still DELCID. Fluids still running 80 mL/hr.  Bed alarm on, bed in low position, call bell within reach.  Will continue to monitor.

## 2020-11-18 NOTE — NURSING NOTE
"Patient declined offer to call spouse for updates. Reported \"she's not picking up her calls right now, my son is already updated, he can tell her in person\".   "

## 2020-11-18 NOTE — PLAN OF CARE
Problem: Adult Inpatient Plan of Care  Goal: Readiness for Transition of Care  Intervention: Mutually Develop Transition Plan  Flowsheets (Taken 11/18/2020 1416)  Equipment Needed After Discharge: none  Assistive Device/Animal Currently Used at Home: none  Anticipated Changes Related to Illness: none  Readmission Within the Last 30 Days: no previous admission in last 30 days  Patient/Family Anticipated Services at Transition: none  Patient/Family Anticipates Transition to: home with family  Transportation Anticipated: family or friend will provide  Concerns to be Addressed: discharge planning     SW spoke w/ pt via bedside phone regarding dcp d/t pt being COVID+. Reviewed CCMSW role. Pt resides w/ his spouse in a [facility]. Pt denies DME or home care services in the past. Pt confirmed his PCP and states he receives medications from [facility] w/o difficulty. Pt currently on 2L NC. Pt unsure of dispo needs at this time. SW will continue to follow for aftercare needs and planning

## 2020-11-19 LAB
ALBUMIN SERPL-MCNC: 2.8 G/DL (ref 3.4–5)
ALP SERPL-CCNC: 69 IU/L (ref 35–126)
ALT SERPL-CCNC: 24 IU/L (ref 16–63)
ANION GAP SERPL CALC-SCNC: 11 MEQ/L (ref 3–15)
ANISOCYTOSIS BLD QL SMEAR: ABNORMAL
AST SERPL-CCNC: 33 IU/L (ref 15–41)
BASOPHILS # BLD: 0 K/UL (ref 0.01–0.1)
BASOPHILS NFR BLD: 0 %
BILIRUB SERPL-MCNC: 1.3 MG/DL (ref 0.3–1.2)
BUN SERPL-MCNC: 22 MG/DL (ref 8–20)
BURR CELLS BLD QL SMEAR: ABNORMAL
CALCIUM SERPL-MCNC: 8.9 MG/DL (ref 8.9–10.3)
CHLORIDE SERPL-SCNC: 106 MEQ/L (ref 98–109)
CO2 SERPL-SCNC: 21 MEQ/L (ref 22–32)
CREAT SERPL-MCNC: 1 MG/DL (ref 0.8–1.3)
DIFFERENTIAL METHOD BLD: ABNORMAL
EOSINOPHIL # BLD: 0 K/UL (ref 0.04–0.54)
EOSINOPHIL NFR BLD: 0 %
ERYTHROCYTE [DISTWIDTH] IN BLOOD BY AUTOMATED COUNT: 19 % (ref 11.6–14.4)
GFR SERPL CREATININE-BSD FRML MDRD: >60 ML/MIN/1.73M*2
GLUCOSE BLD-MCNC: 153 MG/DL (ref 70–99)
GLUCOSE BLD-MCNC: 220 MG/DL (ref 70–99)
GLUCOSE BLD-MCNC: 230 MG/DL (ref 70–99)
GLUCOSE SERPL-MCNC: 138 MG/DL (ref 70–99)
HCT VFR BLDCO AUTO: 41.2 % (ref 40.1–51)
HGB BLD-MCNC: 12.1 G/DL (ref 13.7–17.5)
HYPOCHROMIA BLD QL SMEAR: ABNORMAL
LYMPHOCYTES # BLD: 0.92 K/UL (ref 1.2–3.5)
LYMPHOCYTES NFR BLD: 14 %
MAGNESIUM SERPL-MCNC: 2 MG/DL (ref 1.8–2.5)
MCH RBC QN AUTO: 22.9 PG (ref 28–33.2)
MCHC RBC AUTO-ENTMCNC: 29.4 G/DL (ref 32.2–36.5)
MCV RBC AUTO: 77.9 FL (ref 83–98)
MICROCYTES BLD QL SMEAR: ABNORMAL
MONOCYTES # BLD: 0.53 K/UL (ref 0.3–1)
MONOCYTES NFR BLD: 8 %
NEUTS BAND # BLD: 5.08 K/UL (ref 1.7–7)
NEUTS SEG NFR BLD: 77 %
OVALOCYTES BLD QL SMEAR: ABNORMAL
PDW BLD AUTO: 10.3 FL (ref 9.4–12.4)
PLAT MORPH BLD: NORMAL
PLATELET # BLD AUTO: 405 K/UL (ref 150–350)
PLATELET # BLD EST: ABNORMAL 10*3/UL
POCT TEST: ABNORMAL
POTASSIUM SERPL-SCNC: 4.3 MEQ/L (ref 3.6–5.1)
PROT SERPL-MCNC: 7 G/DL (ref 6–8.2)
RBC # BLD AUTO: 5.29 M/UL (ref 4.5–5.8)
SODIUM SERPL-SCNC: 138 MEQ/L (ref 136–144)
VARIANT LYMPHS # BLD MANUAL: 0.07 K/UL
VARIANT LYMPHS NFR BLD: 1 %
WBC # BLD AUTO: 6.6 K/UL (ref 3.8–10.5)

## 2020-11-19 PROCEDURE — 25800000 HC PHARMACY IV SOLUTIONS

## 2020-11-19 PROCEDURE — 63600000 HC DRUGS/DETAIL CODE: Mod: JW | Performed by: INTERNAL MEDICINE

## 2020-11-19 PROCEDURE — 63600000 HC DRUGS/DETAIL CODE: Performed by: STUDENT IN AN ORGANIZED HEALTH CARE EDUCATION/TRAINING PROGRAM

## 2020-11-19 PROCEDURE — 80053 COMPREHEN METABOLIC PANEL: CPT | Performed by: STUDENT IN AN ORGANIZED HEALTH CARE EDUCATION/TRAINING PROGRAM

## 2020-11-19 PROCEDURE — 99233 SBSQ HOSP IP/OBS HIGH 50: CPT | Mod: CR | Performed by: HOSPITALIST

## 2020-11-19 PROCEDURE — 12000000 HC ROOM AND CARE MED/SURG

## 2020-11-19 PROCEDURE — 25800000 HC PHARMACY IV SOLUTIONS: Performed by: INTERNAL MEDICINE

## 2020-11-19 PROCEDURE — 25000000 HC PHARMACY GENERAL

## 2020-11-19 PROCEDURE — 85025 COMPLETE CBC W/AUTO DIFF WBC: CPT | Performed by: STUDENT IN AN ORGANIZED HEALTH CARE EDUCATION/TRAINING PROGRAM

## 2020-11-19 PROCEDURE — 63700000 HC SELF-ADMINISTRABLE DRUG: Performed by: HOSPITALIST

## 2020-11-19 PROCEDURE — 83735 ASSAY OF MAGNESIUM: CPT | Performed by: HOSPITALIST

## 2020-11-19 PROCEDURE — 63700000 HC SELF-ADMINISTRABLE DRUG: Performed by: STUDENT IN AN ORGANIZED HEALTH CARE EDUCATION/TRAINING PROGRAM

## 2020-11-19 RX ADMIN — ROSUVASTATIN CALCIUM 20 MG: 20 TABLET, FILM COATED ORAL at 17:18

## 2020-11-19 RX ADMIN — INSULIN ASPART 3 UNITS: 100 INJECTION, SOLUTION INTRAVENOUS; SUBCUTANEOUS at 17:19

## 2020-11-19 RX ADMIN — DEXAMETHASONE SODIUM PHOSPHATE 6 MG: 4 INJECTION, SOLUTION INTRA-ARTICULAR; INTRALESIONAL; INTRAMUSCULAR; INTRAVENOUS; SOFT TISSUE at 13:29

## 2020-11-19 RX ADMIN — ALLOPURINOL 300 MG: 100 TABLET ORAL at 08:28

## 2020-11-19 RX ADMIN — HYDRALAZINE HYDROCHLORIDE 25 MG: 50 TABLET ORAL at 06:35

## 2020-11-19 RX ADMIN — INSULIN ASPART 3 UNITS: 100 INJECTION, SOLUTION INTRAVENOUS; SUBCUTANEOUS at 12:48

## 2020-11-19 RX ADMIN — HYDRALAZINE HYDROCHLORIDE 25 MG: 50 TABLET ORAL at 13:29

## 2020-11-19 RX ADMIN — CYANOCOBALAMIN TAB 1000 MCG 1000 MCG: 1000 TAB at 08:29

## 2020-11-19 RX ADMIN — GUAIFENESIN 600 MG: 600 TABLET ORAL at 20:55

## 2020-11-19 RX ADMIN — ASPIRIN 81 MG: 81 TABLET, COATED ORAL at 08:29

## 2020-11-19 RX ADMIN — GUAIFENESIN 600 MG: 600 TABLET ORAL at 08:29

## 2020-11-19 RX ADMIN — Medication 125 MCG: at 08:29

## 2020-11-19 RX ADMIN — POTASSIUM CHLORIDE 20 MEQ: 750 TABLET, EXTENDED RELEASE ORAL at 08:29

## 2020-11-19 RX ADMIN — LISINOPRIL: 20 TABLET ORAL at 08:29

## 2020-11-19 RX ADMIN — GABAPENTIN 300 MG: 300 CAPSULE ORAL at 20:56

## 2020-11-19 RX ADMIN — SODIUM CHLORIDE 100 MG: 0.9 INJECTION, SOLUTION INTRAVENOUS at 12:45

## 2020-11-19 RX ADMIN — GABAPENTIN 300 MG: 300 CAPSULE ORAL at 08:29

## 2020-11-19 RX ADMIN — METOPROLOL TARTRATE 25 MG: 25 TABLET, FILM COATED ORAL at 08:29

## 2020-11-19 RX ADMIN — METOPROLOL TARTRATE 25 MG: 25 TABLET, FILM COATED ORAL at 20:55

## 2020-11-19 RX ADMIN — POTASSIUM CHLORIDE 20 MEQ: 750 TABLET, EXTENDED RELEASE ORAL at 20:55

## 2020-11-19 RX ADMIN — HYDRALAZINE HYDROCHLORIDE 25 MG: 50 TABLET ORAL at 20:55

## 2020-11-19 NOTE — PROGRESS NOTES
"  Infectious Disease Progress Note  Patient Name: Amari Gan MR#: [id number] : 1950 Admitted 2020  Date: 20 Time: 9:32 AM Author: Robbie Guerra MD    ROS: no fever, chills, sweats, diarrhea, rash    Physical Examination:  Vital signs reviewed  Temp (24hrs), Av.3 °C (97.3 °F), Min:36 °C (96.8 °F), Max:36.5 °C (97.7 °F)    Visit Vitals  BP (!) 154/76 (BP Location: Left upper arm, Patient Position: Sitting)   Pulse 76   Temp 36.5 °C (97.7 °F) (Oral)   Resp 18   Ht 1.753 m (5' 9\")   Wt 114 kg (252 lb)   SpO2 97%   BMI 37.21 kg/m²     General: no acute distress  Neurologic: alert  Oropharynx: moist mucous membranes with no lesions  Neck: supple, trachea midline  Heart: regular rate, regular rhythm, normal S1, normal S2  Lungs: clear to auscultation bilaterally  Abdomen: soft, normal bowel sounds, no distension, no mass, no tenderness  Musculoskeletal: normal range of motion, no deformity  Skin: no rash    Recent Results (from the past 24 hour(s))   CBC and Differential    Collection Time: 20 12:18 PM   Result Value Ref Range    WBC 6.09 3.80 - 10.50 K/uL    RBC 4.61 4.50 - 5.80 M/uL    Hemoglobin 10.4 (L) 13.7 - 17.5 g/dL    Hematocrit 35.6 (L) 40.1 - 51.0 %    MCV 77.2 (L) 83.0 - 98.0 fL    MCH 22.6 (L) 28.0 - 33.2 pg    MCHC 29.2 (L) 32.2 - 36.5 g/dL    RDW 18.6 (H) 11.6 - 14.4 %    Platelets 327 150 - 350 K/uL    MPV 10.6 9.4 - 12.4 fL    Differential Type Auto     nRBC 0.3 (H) <=0.0 %    Immature Granulocytes 0.8 %    Neutrophils 79.5 %    Lymphocytes 14.1 %    Monocytes 4.3 %    Eosinophils 1.0 %    Basophils 0.3 %    Immature Granulocytes, Absolute 0.05 0.00 - 0.08 K/uL    Neutrophils, Absolute 4.84 1.70 - 7.00 K/uL    Lymphocytes, Absolute 0.86 (L) 1.20 - 3.50 K/uL    Monocytes, Absolute 0.26 (L) 0.30 - 1.00 K/uL    Eosinophils, Absolute 0.06 0.04 - 0.54 K/uL    Basophils, Absolute 0.02 0.01 - 0.10 K/uL   Comprehensive metabolic panel    Collection Time: 20 12:18 " PM   Result Value Ref Range    Sodium 137 136 - 144 mEQ/L    Potassium 4.0 3.6 - 5.1 mEQ/L    Chloride 104 98 - 109 mEQ/L    CO2 23 22 - 32 mEQ/L    BUN 25 (H) 8 - 20 mg/dL    Creatinine 1.1 0.8 - 1.3 mg/dL    Glucose 181 (H) 70 - 99 mg/dL    Calcium 8.6 (L) 8.9 - 10.3 mg/dL    AST (SGOT) 37 15 - 41 IU/L    ALT (SGPT) 20 16 - 63 IU/L    Alkaline Phosphatase 63 35 - 126 IU/L    Total Protein 6.3 6.0 - 8.2 g/dL    Albumin 2.7 (L) 3.4 - 5.0 g/dL    Bilirubin, Total 1.5 (H) 0.3 - 1.2 mg/dL    eGFR >60.0 >=60.0 mL/min/1.73m*2    Anion Gap 10 3 - 15 mEQ/L   POCT Glucose    Collection Time: 11/18/20  4:33 PM   Result Value Ref Range    POCT Bedside Glucose 157 (H) 70 - 99 mg/dL    POC Test POC    CBC and Differential    Collection Time: 11/19/20  6:39 AM   Result Value Ref Range    WBC 6.60 3.80 - 10.50 K/uL    RBC 5.29 4.50 - 5.80 M/uL    Hemoglobin 12.1 (L) 13.7 - 17.5 g/dL    Hematocrit 41.2 40.1 - 51.0 %    MCV 77.9 (L) 83.0 - 98.0 fL    MCH 22.9 (L) 28.0 - 33.2 pg    MCHC 29.4 (L) 32.2 - 36.5 g/dL    RDW 19.0 (H) 11.6 - 14.4 %    Platelets 405 (H) 150 - 350 K/uL    MPV 10.3 9.4 - 12.4 fL    Differential Type Manu     Neutrophils 77 %    Lymphocytes 14 %    Monocytes 8 %    Eosinophils 0 %    Basophils 0 %    Atypical Lymphocytes 1 %    Neutrophils, Absolute 5.08 1.70 - 7.00 K/uL    Lymphocytes, Absolute 0.92 (L) 1.20 - 3.50 K/uL    Monocytes, Absolute 0.53 0.30 - 1.00 K/uL    Eosinophils, Absolute 0.00 (L) 0.04 - 0.54 K/uL    Basophils, Absolute 0.00 (L) 0.01 - 0.10 K/uL    Atypical Lymphs, Absolute 0.07 (H) <=0.00 K/uL    PLT Morphology Normal     Platelet Estimate Increased (>400,000)     Anisocytosis 1+     Hypochromia Occasional     Microcytes 1+     Ovalocytes 1+     [place] Cells 1+    Comprehensive metabolic panel    Collection Time: 11/19/20  6:39 AM   Result Value Ref Range    Sodium 138 136 - 144 mEQ/L    Potassium 4.3 3.6 - 5.1 mEQ/L    Chloride 106 98 - 109 mEQ/L    CO2 21 (L) 22 - 32 mEQ/L    BUN 22 (H) 8 -  20 mg/dL    Creatinine 1.0 0.8 - 1.3 mg/dL    Glucose 138 (H) 70 - 99 mg/dL    Calcium 8.9 8.9 - 10.3 mg/dL    AST (SGOT) 33 15 - 41 IU/L    ALT (SGPT) 24 16 - 63 IU/L    Alkaline Phosphatase 69 35 - 126 IU/L    Total Protein 7.0 6.0 - 8.2 g/dL    Albumin 2.8 (L) 3.4 - 5.0 g/dL    Bilirubin, Total 1.3 (H) 0.3 - 1.2 mg/dL    eGFR >60.0 >=60.0 mL/min/1.73m*2    Anion Gap 11 3 - 15 mEQ/L   Magnesium    Collection Time: 11/19/20  6:39 AM   Result Value Ref Range    Magnesium 2.0 1.8 - 2.5 mg/dL   POCT Glucose    Collection Time: 11/19/20  7:30 AM   Result Value Ref Range    POCT Bedside Glucose 153 (H) 70 - 99 mg/dL    POC Test POC      Microbiology Results     Procedure Component Value Units Date/Time    Blood Culture Blood, Venous [[id number]] Collected: 11/17/20 1516    Specimen: Blood, Venous Updated: 11/18/20 2200     Culture No growth at 18-24 hours    Blood Culture Blood, Venous [[id number]] Collected: 11/17/20 1504    Specimen: Blood, Venous Updated: 11/18/20 1801     Culture No growth at 18-24 hours    SARS-CoV-2 (COVID-19), PCR Nasopharynx [[id number]]  (Abnormal) Collected: 11/17/20 1411    Specimen: Nasopharyngeal Swab from Nasopharynx Updated: 11/17/20 1518    Narrative:      The following orders were created for panel order SARS-CoV-2 (COVID-19), PCR Nasopharynx.  Procedure                               Abnormality         Status                     ---------                               -----------         ------                     SARS-COV-2 (COVID-19)/ F...[[id number]]  Abnormal            Final result                 Please view results for these tests on the individual orders.    SARS-COV-2 (COVID-19)/ FLU A/B, AND RSV, PCR Nasopharynx [[id number]]  (Abnormal) Collected: 11/17/20 1411    Specimen: Nasopharyngeal Swab from Nasopharynx Updated: 11/17/20 1518     SARS-CoV-2 (COVID-19) Positive     Influenza A Negative     Influenza B Negative     Respiratory Syncytial Virus Negative        Anti-infectives  (From admission, onward)    Start     Dose/Rate Route Frequency Ordered Stop    11/19/20 1242  remdesivir (EUA) ([id number]) 100 mg in sodium chloride 0.9 % 250 mL EUA IVPB      100 mg  500 mL/hr over 30 Minutes intravenous Every 24 hours interval 11/18/20 1158 11/23/20 1244        Assessment:   1. Covid19 tolerating therapy  2. Acute kidney injury resolved, managed by primary service  3. DM managed by primary service     Plan:   continue remdesivir + dexamethasone   monitor symptom report, temperatures, exam    Robbie Guerra MD  11/19/2020 9:32 AM

## 2020-11-19 NOTE — PATIENT CARE CONFERENCE
Care Progression Rounds Note  Date: 11/19/2020  Time: 10:12 AM     Patient Name: Amari Gan     Medical Record Number: [id number]   YOB: 1950  Sex: Male      Room/Bed: 4016    Admitting Diagnosis: Shortness of breath [R06.02]  Hypoxia [R09.02]  Fatigue, unspecified type [R53.83]  COVID-19 [U07.1]   Admit Date/Time: 11/17/2020  1:24 PM    Primary Diagnosis: No Principal Problem: There is no principal problem currently on the Problem List. Please update the Problem List and refresh.  Principal Problem: No Principal Problem: There is no principal problem currently on the Problem List. Please update the Problem List and refresh.    GMLOS: 5.4  Anticipated Discharge Date: 11/22/2020    AM-PAC  Mobility Score: 24    Discharge Planning:       Barriers to Discharge:  Barriers to Discharge: Medical issues not resolved    Participants:  social work/services, , nursing

## 2020-11-19 NOTE — ASSESSMENT & PLAN NOTE
Known + contact: Coworker  Sx: sob, cough, chills, nausea, diarrhea  Cxr: consistent with CoVid pneumonia  CoVid test: positive    Admit to tele  Continuous pulse ox  Continue CoVid protocol   Continue bronchodilators prn   Encouraged to self prone if able to tolerate  O2 to maintain SpO2>90%  ID consulted    11/18  CXR suggesting Covid PNA  Waiting for ID's consult and Treatment decision  Add Mucinex  Following O2 status.    11/19  Stable, on remdesivir plus Decadron

## 2020-11-19 NOTE — PROGRESS NOTES
Hospital Medicine Service -  Daily Progress Note       SUBJECTIVE     Interval History: No acute events overnight.  Feels good.  Using 2 L oxygen nasal cannula currently   OBJECTIVE        Vital signs in last 24 hours:  Temp:  [36 °C (96.8 °F)-36.6 °C (97.8 °F)] 36.6 °C (97.8 °F)  Heart Rate:  [68-77] 76  Resp:  [18-24] 18  BP: (153-186)/(75-92) 159/77  I/O last 3 completed shifts:  In: 240 [P.O.:240]  Out: -     PHYSICAL EXAMINATION        GEN: well-developed and well-nourished; not in acute distress  HEENT: normocephalic; atraumatic  NECK: no JVD; no bruits  CARDIO: regular rate and rhythm; no murmurs or rubs  RESP: clear to auscultation bilaterally; no rales, rhonchi, or wheezes  ABD: soft, non-distended, non-tender, normal bowel sounds  EXT: no cyanosis, clubbing, or edema  SKIN: clean, dry, warm, and intact  MUSCULOSKELETAL: no injury or deformity  NEURO: alert and oriented x 3; nonfocal  BEHAVIOR/EMOTIONAL: appropriate; cooperative     LABS / IMAGING / TELE        Labs  Lab Results   Component Value Date    WBC 6.60 11/19/2020    HGB 12.1 (L) 11/19/2020    HCT 41.2 11/19/2020    MCV 77.9 (L) 11/19/2020     (H) 11/19/2020     Lab Results   Component Value Date    GLUCOSE 138 (H) 11/19/2020    CALCIUM 8.9 11/19/2020     11/19/2020    K 4.3 11/19/2020    CO2 21 (L) 11/19/2020     11/19/2020    BUN 22 (H) 11/19/2020    CREATININE 1.0 11/19/2020     No results found for: INR, PROTIME    Imaging  X-ray Chest 1 View    Result Date: 11/17/2020  IMPRESSION: Scattered patchy opacities suspicious for infectious/inflammatory process including that which can be seen with Covid 19 infection.  Follow-up is recommended with PA and lateral view to document resolution.       ECG/Telemetry  I have independently reviewed the telemetry. No events for the last 24 hours.    ASSESSMENT AND PLAN      Obesity (BMI 35.0-39.9 without comorbidity)  Assessment & Plan  .    HLD (hyperlipidemia)  Assessment &  Plan  During home statin    Diabetes (CMS/[facility])  Assessment & Plan  Insulin sliding scale  POCG  Holding oral hypoglycemic agents    CAD (coronary artery disease)  Assessment & Plan  Continue home cardiac regimen, aspirin, statin, beta-blocker    HTN (hypertension)  Assessment & Plan  Continue home antihypertensives    11/18  Uncontrolled  Dc IVF  Add Hydralazin  Monitoring BP closely    Gout  Assessment & Plan  Continue home allopurinol    PRATIK (acute kidney injury) (CMS/[facility])  Assessment & Plan  No reported hx of ckd  Poor po intake  IVF  Hold hctz for today  Trend    11/18  Eating well, dc IVF    Hypokalemia  Assessment & Plan  Replete 20mg oral  Bmp tomorrow    COVID-19  Assessment & Plan  Known + contact: Coworker  Sx: sob, cough, chills, nausea, diarrhea  Cxr: consistent with CoVid pneumonia  CoVid test: positive    Admit to tele  Continuous pulse ox  Continue CoVid protocol   Continue bronchodilators prn   Encouraged to self prone if able to tolerate  O2 to maintain SpO2>90%  ID consulted    11/18  CXR suggesting Covid PNA  Waiting for ID's consult and Treatment decision  Add Mucinex  Following O2 status.    11/19  Stable, on remdesivir plus Decadron       VTE Assessment: Padua    Code Status: Full Code  Estimated discharge date: 11/22/2020     Arturo Leal MD  11/19/2020  3:47 PM

## 2020-11-20 LAB
ALBUMIN SERPL-MCNC: 2.8 G/DL (ref 3.4–5)
ALP SERPL-CCNC: 67 IU/L (ref 35–126)
ALT SERPL-CCNC: 25 IU/L (ref 16–63)
ANION GAP SERPL CALC-SCNC: 9 MEQ/L (ref 3–15)
AST SERPL-CCNC: 34 IU/L (ref 15–41)
BASOPHILS # BLD: 0.02 K/UL (ref 0.01–0.1)
BASOPHILS NFR BLD: 0.2 %
BILIRUB SERPL-MCNC: 1 MG/DL (ref 0.3–1.2)
BUN SERPL-MCNC: 26 MG/DL (ref 8–20)
CALCIUM SERPL-MCNC: 8.8 MG/DL (ref 8.9–10.3)
CHLORIDE SERPL-SCNC: 106 MEQ/L (ref 98–109)
CO2 SERPL-SCNC: 21 MEQ/L (ref 22–32)
CREAT SERPL-MCNC: 1.2 MG/DL (ref 0.8–1.3)
DIFFERENTIAL METHOD BLD: ABNORMAL
EOSINOPHIL # BLD: 0.14 K/UL (ref 0.04–0.54)
EOSINOPHIL NFR BLD: 1.5 %
ERYTHROCYTE [DISTWIDTH] IN BLOOD BY AUTOMATED COUNT: 19.4 % (ref 11.6–14.4)
GFR SERPL CREATININE-BSD FRML MDRD: >60 ML/MIN/1.73M*2
GLUCOSE BLD-MCNC: 134 MG/DL (ref 70–99)
GLUCOSE BLD-MCNC: 188 MG/DL (ref 70–99)
GLUCOSE BLD-MCNC: 202 MG/DL (ref 70–99)
GLUCOSE SERPL-MCNC: 237 MG/DL (ref 70–99)
HCT VFR BLDCO AUTO: 40.2 % (ref 40.1–51)
HGB BLD-MCNC: 11.7 G/DL (ref 13.7–17.5)
IMM GRANULOCYTES # BLD AUTO: 0.07 K/UL (ref 0–0.08)
IMM GRANULOCYTES NFR BLD AUTO: 0.8 %
LYMPHOCYTES # BLD: 0.79 K/UL (ref 1.2–3.5)
LYMPHOCYTES NFR BLD: 8.5 %
MCH RBC QN AUTO: 22.9 PG (ref 28–33.2)
MCHC RBC AUTO-ENTMCNC: 29.1 G/DL (ref 32.2–36.5)
MCV RBC AUTO: 78.5 FL (ref 83–98)
MONOCYTES # BLD: 0.43 K/UL (ref 0.3–1)
MONOCYTES NFR BLD: 4.6 %
NEUTROPHILS # BLD: 7.81 K/UL (ref 1.7–7)
NEUTS SEG NFR BLD: 84.4 %
NRBC BLD-RTO: 0 %
PDW BLD AUTO: 10.4 FL (ref 9.4–12.4)
PLATELET # BLD AUTO: 444 K/UL (ref 150–350)
POCT TEST: ABNORMAL
POTASSIUM SERPL-SCNC: 4.3 MEQ/L (ref 3.6–5.1)
PROT SERPL-MCNC: 6.6 G/DL (ref 6–8.2)
RBC # BLD AUTO: 5.12 M/UL (ref 4.5–5.8)
SODIUM SERPL-SCNC: 136 MEQ/L (ref 136–144)
WBC # BLD AUTO: 9.26 K/UL (ref 3.8–10.5)

## 2020-11-20 PROCEDURE — 63700000 HC SELF-ADMINISTRABLE DRUG: Performed by: HOSPITALIST

## 2020-11-20 PROCEDURE — 25000000 HC PHARMACY GENERAL: Performed by: INTERNAL MEDICINE

## 2020-11-20 PROCEDURE — 99232 SBSQ HOSP IP/OBS MODERATE 35: CPT | Mod: CR | Performed by: HOSPITALIST

## 2020-11-20 PROCEDURE — 63700000 HC SELF-ADMINISTRABLE DRUG: Performed by: STUDENT IN AN ORGANIZED HEALTH CARE EDUCATION/TRAINING PROGRAM

## 2020-11-20 PROCEDURE — 63600000 HC DRUGS/DETAIL CODE: Mod: JW | Performed by: INTERNAL MEDICINE

## 2020-11-20 PROCEDURE — 36415 COLL VENOUS BLD VENIPUNCTURE: CPT | Performed by: STUDENT IN AN ORGANIZED HEALTH CARE EDUCATION/TRAINING PROGRAM

## 2020-11-20 PROCEDURE — 80053 COMPREHEN METABOLIC PANEL: CPT | Performed by: STUDENT IN AN ORGANIZED HEALTH CARE EDUCATION/TRAINING PROGRAM

## 2020-11-20 PROCEDURE — 12000000 HC ROOM AND CARE MED/SURG

## 2020-11-20 PROCEDURE — 25800000 HC PHARMACY IV SOLUTIONS: Performed by: INTERNAL MEDICINE

## 2020-11-20 PROCEDURE — 85025 COMPLETE CBC W/AUTO DIFF WBC: CPT | Performed by: STUDENT IN AN ORGANIZED HEALTH CARE EDUCATION/TRAINING PROGRAM

## 2020-11-20 RX ADMIN — POTASSIUM CHLORIDE 20 MEQ: 750 TABLET, EXTENDED RELEASE ORAL at 10:15

## 2020-11-20 RX ADMIN — ASPIRIN 81 MG: 81 TABLET, COATED ORAL at 10:15

## 2020-11-20 RX ADMIN — INSULIN ASPART 3 UNITS: 100 INJECTION, SOLUTION INTRAVENOUS; SUBCUTANEOUS at 13:25

## 2020-11-20 RX ADMIN — HYDRALAZINE HYDROCHLORIDE 25 MG: 50 TABLET ORAL at 21:10

## 2020-11-20 RX ADMIN — METOPROLOL TARTRATE 25 MG: 25 TABLET, FILM COATED ORAL at 10:15

## 2020-11-20 RX ADMIN — CYANOCOBALAMIN TAB 1000 MCG 1000 MCG: 1000 TAB at 10:15

## 2020-11-20 RX ADMIN — GUAIFENESIN 600 MG: 600 TABLET ORAL at 21:10

## 2020-11-20 RX ADMIN — ALLOPURINOL 300 MG: 100 TABLET ORAL at 10:15

## 2020-11-20 RX ADMIN — HYDRALAZINE HYDROCHLORIDE 25 MG: 50 TABLET ORAL at 13:25

## 2020-11-20 RX ADMIN — POTASSIUM CHLORIDE 20 MEQ: 750 TABLET, EXTENDED RELEASE ORAL at 21:10

## 2020-11-20 RX ADMIN — GUAIFENESIN 600 MG: 600 TABLET ORAL at 10:15

## 2020-11-20 RX ADMIN — HYDRALAZINE HYDROCHLORIDE 25 MG: 50 TABLET ORAL at 05:46

## 2020-11-20 RX ADMIN — GABAPENTIN 300 MG: 300 CAPSULE ORAL at 21:10

## 2020-11-20 RX ADMIN — INSULIN ASPART 3 UNITS: 100 INJECTION, SOLUTION INTRAVENOUS; SUBCUTANEOUS at 17:49

## 2020-11-20 RX ADMIN — Medication 125 MCG: at 10:15

## 2020-11-20 RX ADMIN — REMDESIVIR 100 MG: 100 INJECTION, POWDER, LYOPHILIZED, FOR SOLUTION INTRAVENOUS at 13:25

## 2020-11-20 RX ADMIN — LISINOPRIL: 20 TABLET ORAL at 10:15

## 2020-11-20 RX ADMIN — METOPROLOL TARTRATE 25 MG: 25 TABLET, FILM COATED ORAL at 21:09

## 2020-11-20 RX ADMIN — DEXAMETHASONE SODIUM PHOSPHATE 6 MG: 4 INJECTION, SOLUTION INTRA-ARTICULAR; INTRALESIONAL; INTRAMUSCULAR; INTRAVENOUS; SOFT TISSUE at 13:25

## 2020-11-20 RX ADMIN — ROSUVASTATIN CALCIUM 20 MG: 20 TABLET, FILM COATED ORAL at 17:49

## 2020-11-20 RX ADMIN — GABAPENTIN 300 MG: 300 CAPSULE ORAL at 10:15

## 2020-11-20 NOTE — PLAN OF CARE
Plan of Care Review  Plan of Care Reviewed With: patient  Progress: improving  Outcome Summary: Patient now on 1L O2 NC, SpO2 94-96%, mild DELCID but no c/o SOB at rest, remdesivir and decadron continued.

## 2020-11-20 NOTE — ASSESSMENT & PLAN NOTE
Known + contact: Coworker  Sx: sob, cough, chills, nausea, diarrhea  Cxr: consistent with CoVid pneumonia  CoVid test: positive    Admit to tele  Continuous pulse ox  Continue CoVid protocol   Continue bronchodilators prn   Encouraged to self prone if able to tolerate  O2 to maintain SpO2>90%  ID consulted    11/18  CXR suggesting Covid PNA  Waiting for ID's consult and Treatment decision  Add Mucinex  Following O2 status.    11/19-20  Stable to improving, on remdesivir plus Decadron

## 2020-11-20 NOTE — PROGRESS NOTES
"  Infectious Disease Progress Note  Patient Name: Amari Gan MR#: [id number] : 1950 Admitted 2020  Date: 20 Time: 10:58 AM Author: Robbie Guerra MD    ROS: less dyspnea; no fever, chills, sweats, diarrhea, rash    Physical Examination:  Vital signs reviewed  Temp (24hrs), Av.4 °C (97.6 °F), Min:36.1 °C (97 °F), Max:36.6 °C (97.9 °F)    Visit Vitals  BP (!) 151/71 (BP Location: Left upper arm, Patient Position: Lying)   Pulse 80   Temp 36.6 °C (97.9 °F) (Oral)   Resp 18   Ht 1.753 m (5' 9\")   Wt 114 kg (252 lb)   SpO2 97%   BMI 37.21 kg/m²     General: no acute distress  Neurologic: alert  Oropharynx: moist mucous membranes with no lesions  Neck: supple, trachea midline  Heart: regular rate, regular rhythm, normal S1, normal S2  Lungs: clear to auscultation bilaterally  Abdomen: soft, normal bowel sounds, no distension, no mass, no tenderness  Musculoskeletal: normal range of motion, no deformity  Skin: no rash    Recent Results (from the past 24 hour(s))   POCT Glucose    Collection Time: 20 11:30 AM   Result Value Ref Range    POCT Bedside Glucose 220 (H) 70 - 99 mg/dL    POC Test POC    POCT Glucose    Collection Time: 20  4:41 PM   Result Value Ref Range    POCT Bedside Glucose 230 (H) 70 - 99 mg/dL    POC Test POC    POCT Glucose    Collection Time: 20  7:55 AM   Result Value Ref Range    POCT Bedside Glucose 134 (H) 70 - 99 mg/dL    POC Test POC      Microbiology Results     Procedure Component Value Units Date/Time    Blood Culture Blood, Venous [[id number]] Collected: 20 1516    Specimen: Blood, Venous Updated: 20 220     Culture No growth at 18-24 hours    Blood Culture Blood, Venous [[id number]] Collected: 20 1504    Specimen: Blood, Venous Updated: 20 1801     Culture No growth at 18-24 hours    SARS-CoV-2 (COVID-19), PCR Nasopharynx [[id number]]  (Abnormal) Collected: 20 6002    Specimen: Nasopharyngeal Swab from " Nasopharynx Updated: 11/17/20 1518    Narrative:      The following orders were created for panel order SARS-CoV-2 (COVID-19), PCR Nasopharynx.  Procedure                               Abnormality         Status                     ---------                               -----------         ------                     SARS-COV-2 (COVID-19)/ F...[[id number]]  Abnormal            Final result                 Please view results for these tests on the individual orders.    SARS-COV-2 (COVID-19)/ FLU A/B, AND RSV, PCR Nasopharynx [[id number]]  (Abnormal) Collected: 11/17/20 1411    Specimen: Nasopharyngeal Swab from Nasopharynx Updated: 11/17/20 1518     SARS-CoV-2 (COVID-19) Positive     Influenza A Negative     Influenza B Negative     Respiratory Syncytial Virus Negative        Anti-infectives (From admission, onward)    Start     Dose/Rate Route Frequency Ordered Stop    11/20/20 1300  remdesivir (GS-5734/[id number]) 100 mg in sodium chloride 0.9 % 250 mL IVPB     Note to Pharmacy: Pharmacist: Please note product selection during verification. For EUA doses, select NDC [address]-0 and do NOT use Dispense Prep    100 mg  500 mL/hr over 30 Minutes intravenous Daily 11/19/20 1615 11/23/20 1259        Assessment:   1. Covid19 tolerating therapy  2. Acute kidney injury resolved, managed by primary service  3. DM managed by primary service     Plan:   continue remdesivir + dexamethasone   monitor symptom report, temperatures, exam    Robbie Guerra MD  11/20/2020 10:58 AM

## 2020-11-20 NOTE — PROGRESS NOTES
Hospital Medicine Service -  Daily Progress Note       SUBJECTIVE     Interval History: No acute events overnight.  Feels good.  Using 1 L oxygen nasal cannula currently   OBJECTIVE        Vital signs in last 24 hours:  Temp:  [36.2 °C (97.2 °F)-36.6 °C (97.9 °F)] 36.5 °C (97.7 °F)  Heart Rate:  [68-80] 73  Resp:  [18-20] 20  BP: (138-174)/(68-88) 146/71  I/O last 3 completed shifts:  In: 480 [P.O.:480]  Out: -     PHYSICAL EXAMINATION        GEN: well-developed and well-nourished; not in acute distress  HEENT: normocephalic; atraumatic  NECK: no JVD; no bruits  CARDIO: regular rate and rhythm; no murmurs or rubs  RESP: clear to auscultation bilaterally; no rales, rhonchi, or wheezes  ABD: soft, non-distended, non-tender, normal bowel sounds  EXT: no cyanosis, clubbing, or edema  SKIN: clean, dry, warm, and intact  MUSCULOSKELETAL: no injury or deformity  NEURO: alert and oriented x 3; nonfocal  BEHAVIOR/EMOTIONAL: appropriate; cooperative     LABS / IMAGING / TELE        Labs  Lab Results   Component Value Date    WBC 9.26 11/20/2020    HGB 11.7 (L) 11/20/2020    HCT 40.2 11/20/2020    MCV 78.5 (L) 11/20/2020     (H) 11/20/2020     Lab Results   Component Value Date    GLUCOSE 237 (H) 11/20/2020    CALCIUM 8.8 (L) 11/20/2020     11/20/2020    K 4.3 11/20/2020    CO2 21 (L) 11/20/2020     11/20/2020    BUN 26 (H) 11/20/2020    CREATININE 1.2 11/20/2020     No results found for: INR, PROTIME    Imaging  X-ray Chest 1 View    Result Date: 11/17/2020  IMPRESSION: Scattered patchy opacities suspicious for infectious/inflammatory process including that which can be seen with Covid 19 infection.  Follow-up is recommended with PA and lateral view to document resolution.       ECG/Telemetry  I have independently reviewed the telemetry. No events for the last 24 hours.    ASSESSMENT AND PLAN      Obesity (BMI 35.0-39.9 without comorbidity)  Assessment & Plan  .    HLD (hyperlipidemia)  Assessment &  Plan  During home statin    Diabetes (CMS/[facility])  Assessment & Plan  Insulin sliding scale  POCG  Holding oral hypoglycemic agents    CAD (coronary artery disease)  Assessment & Plan  Continue home cardiac regimen, aspirin, statin, beta-blocker    HTN (hypertension)  Assessment & Plan  Continue home antihypertensives    11/18  Uncontrolled  Dc IVF  Add Hydralazin  Monitoring BP closely    Gout  Assessment & Plan  Continue home allopurinol    PRATIK (acute kidney injury) (CMS/[facility])  Assessment & Plan  No reported hx of ckd  Poor po intake  IVF  Hold hctz for today  Trend    11/18  Eating well, dc IVF    Hypokalemia  Assessment & Plan  Replete 20mg oral  Bmp tomorrow    COVID-19  Assessment & Plan  Known + contact: Coworker  Sx: sob, cough, chills, nausea, diarrhea  Cxr: consistent with CoVid pneumonia  CoVid test: positive    Admit to tele  Continuous pulse ox  Continue CoVid protocol   Continue bronchodilators prn   Encouraged to self prone if able to tolerate  O2 to maintain SpO2>90%  ID consulted    11/18  CXR suggesting Covid PNA  Waiting for ID's consult and Treatment decision  Add Mucinex  Following O2 status.    11/19-20  Stable to improving, on remdesivir plus Decadron       VTE Assessment: Padua    Code Status: Full Code  Estimated discharge date: 11/22/2020     Arturo Leal MD  11/20/2020  6:09 PM

## 2020-11-21 LAB
GLUCOSE BLD-MCNC: 166 MG/DL (ref 70–99)
GLUCOSE BLD-MCNC: 244 MG/DL (ref 70–99)
GLUCOSE BLD-MCNC: 246 MG/DL (ref 70–99)
GLUCOSE BLD-MCNC: 289 MG/DL (ref 70–99)
GLUCOSE BLD-MCNC: 324 MG/DL (ref 70–99)
GLUCOSE BLD-MCNC: 339 MG/DL (ref 70–99)
POCT TEST: ABNORMAL

## 2020-11-21 PROCEDURE — 25000000 HC PHARMACY GENERAL: Performed by: INTERNAL MEDICINE

## 2020-11-21 PROCEDURE — 63600000 HC DRUGS/DETAIL CODE: Performed by: INTERNAL MEDICINE

## 2020-11-21 PROCEDURE — 63700000 HC SELF-ADMINISTRABLE DRUG: Performed by: HOSPITALIST

## 2020-11-21 PROCEDURE — 25800000 HC PHARMACY IV SOLUTIONS: Performed by: INTERNAL MEDICINE

## 2020-11-21 PROCEDURE — 63700000 HC SELF-ADMINISTRABLE DRUG: Performed by: STUDENT IN AN ORGANIZED HEALTH CARE EDUCATION/TRAINING PROGRAM

## 2020-11-21 PROCEDURE — 12000000 HC ROOM AND CARE MED/SURG

## 2020-11-21 PROCEDURE — 99232 SBSQ HOSP IP/OBS MODERATE 35: CPT | Mod: CR | Performed by: HOSPITALIST

## 2020-11-21 RX ORDER — INSULIN ASPART 100 [IU]/ML
6 INJECTION, SOLUTION INTRAVENOUS; SUBCUTANEOUS ONCE
Status: COMPLETED | OUTPATIENT
Start: 2020-11-21 | End: 2020-11-21

## 2020-11-21 RX ADMIN — CYANOCOBALAMIN TAB 1000 MCG 1000 MCG: 1000 TAB at 08:47

## 2020-11-21 RX ADMIN — DEXAMETHASONE SODIUM PHOSPHATE 6 MG: 4 INJECTION, SOLUTION INTRA-ARTICULAR; INTRALESIONAL; INTRAMUSCULAR; INTRAVENOUS; SOFT TISSUE at 12:33

## 2020-11-21 RX ADMIN — INSULIN ASPART 3 UNITS: 100 INJECTION, SOLUTION INTRAVENOUS; SUBCUTANEOUS at 17:14

## 2020-11-21 RX ADMIN — HYDRALAZINE HYDROCHLORIDE 25 MG: 50 TABLET ORAL at 21:01

## 2020-11-21 RX ADMIN — GABAPENTIN 300 MG: 300 CAPSULE ORAL at 08:47

## 2020-11-21 RX ADMIN — REMDESIVIR 100 MG: 100 INJECTION, POWDER, LYOPHILIZED, FOR SOLUTION INTRAVENOUS at 12:36

## 2020-11-21 RX ADMIN — GABAPENTIN 300 MG: 300 CAPSULE ORAL at 21:01

## 2020-11-21 RX ADMIN — POTASSIUM CHLORIDE 20 MEQ: 750 TABLET, EXTENDED RELEASE ORAL at 21:00

## 2020-11-21 RX ADMIN — HYDRALAZINE HYDROCHLORIDE 25 MG: 50 TABLET ORAL at 05:22

## 2020-11-21 RX ADMIN — METOPROLOL TARTRATE 25 MG: 25 TABLET, FILM COATED ORAL at 08:47

## 2020-11-21 RX ADMIN — ALLOPURINOL 300 MG: 100 TABLET ORAL at 08:47

## 2020-11-21 RX ADMIN — GUAIFENESIN 600 MG: 600 TABLET ORAL at 08:48

## 2020-11-21 RX ADMIN — GUAIFENESIN 600 MG: 600 TABLET ORAL at 21:00

## 2020-11-21 RX ADMIN — Medication 125 MCG: at 08:48

## 2020-11-21 RX ADMIN — HYDRALAZINE HYDROCHLORIDE 25 MG: 50 TABLET ORAL at 13:50

## 2020-11-21 RX ADMIN — INSULIN ASPART 6 UNITS: 100 INJECTION, SOLUTION INTRAVENOUS; SUBCUTANEOUS at 21:18

## 2020-11-21 RX ADMIN — INSULIN ASPART 3 UNITS: 100 INJECTION, SOLUTION INTRAVENOUS; SUBCUTANEOUS at 12:32

## 2020-11-21 RX ADMIN — LISINOPRIL: 20 TABLET ORAL at 08:47

## 2020-11-21 RX ADMIN — POTASSIUM CHLORIDE 20 MEQ: 750 TABLET, EXTENDED RELEASE ORAL at 08:47

## 2020-11-21 RX ADMIN — METOPROLOL TARTRATE 25 MG: 25 TABLET, FILM COATED ORAL at 21:01

## 2020-11-21 RX ADMIN — ROSUVASTATIN CALCIUM 20 MG: 20 TABLET, FILM COATED ORAL at 17:14

## 2020-11-21 RX ADMIN — ASPIRIN 81 MG: 81 TABLET, COATED ORAL at 08:48

## 2020-11-21 NOTE — ASSESSMENT & PLAN NOTE
Insulin sliding scale  POCG  Holding oral hypoglycemic agents    Blood sugar is on the high side, received a sliding scale, recommend resume home dose insulin and oral medications, later blood sugar every day and follow-up PCP for further management of diabetes

## 2020-11-21 NOTE — PROGRESS NOTES
Hospital Medicine Service -  Daily Progress Note       SUBJECTIVE   Interval History: Amari Gan was seen and examined, no acute overnight event reported, patient reported no chest pain, sob, N/V/D, overt bleeding.  No new co, feel better, on RA, on day 4  remdesivir plus Decadron       OBJECTIVE      Vital signs in last 24 hours:  Vitals:    11/21/20 1245   BP: (!) 141/79   Pulse: 68   Resp: 20   Temp: 36.1 °C (97 °F)   SpO2: 94%     No intake or output data in the 24 hours ending 11/21/20 1516    PHYSICAL EXAMINATION      General Appearance:  Awake, Alert, no distress     Head:  Normocephalic, without obvious abnormality, atraumatic   Neck: Supple      Lungs:   Clear to auscultation bilaterally  respirations unlabored  no rales  no wheezing     Heart:  Regular rhythm  S1 and S2 normal  no murmur, rub or gallop     Abdomen:   Soft, non-tender, no masses, no organomegaly     Vascular: Pulses 2+ and symmetric all extremities     Extremities: no calf tenderness      Behavior/Emotional: Appropriate, cooperative          LINES, CATHETERS, DRAINS, AIRWAYS, AND WOUNDS   Lines, Drains, Airways, Wounds:  Peripheral IV 11/18/20 Anterior;Right;Upper Arm (Active)   Number of days: 3       Comments:      LABS / IMAGING / TELE      Labs  CMP Results       11/20/20 11/19/20 11/18/20                    1500 0639 1218          138 137         K 4.3 4.3 4.0         Cl 106 106 104         CO2 21 21 23         Glucose 237 138 181         BUN 26 22 25         Creatinine 1.2 1.0 1.1         Calcium 8.8 8.9 8.6         Anion Gap 9 11 10         AST 34 33 37         ALT 25 24 20         Albumin 2.8 2.8 2.7         EGFR >60.0 >60.0 >60.0                     CBC Results       11/20/20 11/19/20 11/18/20                    1500 0639 1218         WBC 9.26 6.60 6.09         RBC 5.12 5.29 4.61         HGB 11.7 12.1 10.4         HCT 40.2 41.2 35.6         MCV 78.5 77.9 77.2         MCH 22.9 22.9 22.6         MCHC 29.1 29.4 29.2           405 327                     Troponin I Results       11/17/20                          1411           Troponin I 0.02                         PT/PTT Results     No lab values to display.        No results found for: BNP, CHOL, TRIG, HDL, LDLCALC, TSH, HGBA1C, DDIMER  Imaging  X-ray Chest 1 View    Result Date: 11/17/2020  IMPRESSION: Scattered patchy opacities suspicious for infectious/inflammatory process including that which can be seen with Covid 19 infection.  Follow-up is recommended with PA and lateral view to document resolution.       ECG/Telemetry  Tele and or EKG was Reviewed     ASSESSMENT AND PLAN        Obesity (BMI 35.0-39.9 without comorbidity)  Assessment & Plan  .    HLD (hyperlipidemia)  Assessment & Plan  During home statin    Diabetes (CMS/[facility])  Assessment & Plan  Insulin sliding scale  POCG  Holding oral hypoglycemic agents    CAD (coronary artery disease)  Assessment & Plan  Continue home cardiac regimen, aspirin, statin, beta-blocker    HTN (hypertension)  Assessment & Plan  Continue home antihypertensives    11/18  Uncontrolled  Dc IVF  Add Hydralazin  Monitoring BP closely    Gout  Assessment & Plan  Continue home allopurinol    PRATIK (acute kidney injury) (CMS/[facility])  Assessment & Plan  No reported hx of ckd  Poor po intake  IVF  Hold hctz for today  Trend    11/18  Eating well, dc IVF    11-21 and after   Cr wnl     Hypokalemia  Assessment & Plan  Replete 20mg oral  Bmp tomorrow    COVID-19  Assessment & Plan  Known + contact: Coworker  Sx: sob, cough, chills, nausea, diarrhea  Cxr: consistent with CoVid pneumonia  CoVid test: positive    Admit to tele  Continuous pulse ox  Continue CoVid protocol   Continue bronchodilators prn   Encouraged to self prone if able to tolerate  O2 to maintain SpO2>90%  ID consulted    11/18  CXR suggesting Covid PNA  Waiting for ID's consult and Treatment decision  Add Mucinex  Following O2 status.    11/19-20  Stable to improving, on remdesivir plus  Decadron    11-21 and after   Day 4/5  remdesivir plus Decadron           VTE Assessment: Padua    VTE Prophylaxis Plan: Continue current DVT Prophylaxis   Code Status: Full Code  Estimated Discharge Date: 11/22/2020  Disposition Planning: Pending progression    This patient note has been dictated using speech recognition software. Inadvertent speech recognition errors should be disregarded. Please do not hesitate to call [facility] [place] office for clarifications.     Gail Felix MD  11/21/2020

## 2020-11-21 NOTE — ASSESSMENT & PLAN NOTE
Known + contact: Coworker  Sx: sob, cough, chills, nausea, diarrhea  Cxr: consistent with CoVid pneumonia  CoVid test: positive    Admit to tele  Continuous pulse ox  Continue CoVid protocol   Continue bronchodilators prn   Encouraged to self prone if able to tolerate  O2 to maintain SpO2>90%  ID consulted    11/18  CXR suggesting Covid PNA  Waiting for ID's consult and Treatment decision  Add Mucinex  Following O2 status.    11/19-20  Stable to improving, on remdesivir plus Decadron    11-21 and after   Day 4/5  remdesivir plus Decadron

## 2020-11-21 NOTE — ASSESSMENT & PLAN NOTE
No reported hx of ckd  Poor po intake  IVF  Hold hctz for today  Trend    11/18  Eating well, dc IVF    11-21 and after   Cr wnl

## 2020-11-22 VITALS
TEMPERATURE: 97.5 F | DIASTOLIC BLOOD PRESSURE: 74 MMHG | BODY MASS INDEX: 37.33 KG/M2 | OXYGEN SATURATION: 95 % | WEIGHT: 252 LBS | HEART RATE: 66 BPM | HEIGHT: 69 IN | SYSTOLIC BLOOD PRESSURE: 155 MMHG | RESPIRATION RATE: 20 BRPM

## 2020-11-22 LAB
ALBUMIN SERPL-MCNC: 2.9 G/DL (ref 3.4–5)
ALP SERPL-CCNC: 64 IU/L (ref 35–126)
ALT SERPL-CCNC: 39 IU/L (ref 16–63)
ANION GAP SERPL CALC-SCNC: 11 MEQ/L (ref 3–15)
AST SERPL-CCNC: 43 IU/L (ref 15–41)
BACTERIA BLD CULT: NORMAL
BACTERIA BLD CULT: NORMAL
BASOPHILS # BLD: 0.03 K/UL (ref 0.01–0.1)
BASOPHILS NFR BLD: 0.4 %
BILIRUB SERPL-MCNC: 1.2 MG/DL (ref 0.3–1.2)
BUN SERPL-MCNC: 32 MG/DL (ref 8–20)
CALCIUM SERPL-MCNC: 9.2 MG/DL (ref 8.9–10.3)
CHLORIDE SERPL-SCNC: 104 MEQ/L (ref 98–109)
CO2 SERPL-SCNC: 22 MEQ/L (ref 22–32)
CREAT SERPL-MCNC: 1.2 MG/DL (ref 0.8–1.3)
DIFFERENTIAL METHOD BLD: ABNORMAL
EOSINOPHIL # BLD: 0.08 K/UL (ref 0.04–0.54)
EOSINOPHIL NFR BLD: 1 %
ERYTHROCYTE [DISTWIDTH] IN BLOOD BY AUTOMATED COUNT: 19.3 % (ref 11.6–14.4)
GFR SERPL CREATININE-BSD FRML MDRD: >60 ML/MIN/1.73M*2
GLUCOSE BLD-MCNC: 189 MG/DL (ref 70–99)
GLUCOSE BLD-MCNC: 209 MG/DL (ref 70–99)
GLUCOSE SERPL-MCNC: 287 MG/DL (ref 70–99)
HCT VFR BLDCO AUTO: 40.3 % (ref 40.1–51)
HGB BLD-MCNC: 11.7 G/DL (ref 13.7–17.5)
IMM GRANULOCYTES # BLD AUTO: 0.07 K/UL (ref 0–0.08)
IMM GRANULOCYTES NFR BLD AUTO: 0.9 %
LYMPHOCYTES # BLD: 1.53 K/UL (ref 1.2–3.5)
LYMPHOCYTES NFR BLD: 19.6 %
MAGNESIUM SERPL-MCNC: 2.2 MG/DL (ref 1.8–2.5)
MCH RBC QN AUTO: 22.5 PG (ref 28–33.2)
MCHC RBC AUTO-ENTMCNC: 29 G/DL (ref 32.2–36.5)
MCV RBC AUTO: 77.6 FL (ref 83–98)
MONOCYTES # BLD: 0.63 K/UL (ref 0.3–1)
MONOCYTES NFR BLD: 8.1 %
NEUTROPHILS # BLD: 5.48 K/UL (ref 1.7–7)
NEUTS SEG NFR BLD: 70 %
NRBC BLD-RTO: 0 %
PDW BLD AUTO: 10.4 FL (ref 9.4–12.4)
PHOSPHATE SERPL-MCNC: 4.3 MG/DL (ref 2.4–4.7)
PLATELET # BLD AUTO: 530 K/UL (ref 150–350)
POCT TEST: ABNORMAL
POCT TEST: ABNORMAL
POTASSIUM SERPL-SCNC: 4.6 MEQ/L (ref 3.6–5.1)
PROT SERPL-MCNC: 6.6 G/DL (ref 6–8.2)
RBC # BLD AUTO: 5.19 M/UL (ref 4.5–5.8)
SODIUM SERPL-SCNC: 137 MEQ/L (ref 136–144)
WBC # BLD AUTO: 7.82 K/UL (ref 3.8–10.5)

## 2020-11-22 PROCEDURE — 25800000 HC PHARMACY IV SOLUTIONS: Performed by: INTERNAL MEDICINE

## 2020-11-22 PROCEDURE — 63700000 HC SELF-ADMINISTRABLE DRUG: Performed by: HOSPITALIST

## 2020-11-22 PROCEDURE — 85025 COMPLETE CBC W/AUTO DIFF WBC: CPT | Performed by: STUDENT IN AN ORGANIZED HEALTH CARE EDUCATION/TRAINING PROGRAM

## 2020-11-22 PROCEDURE — 84100 ASSAY OF PHOSPHORUS: CPT | Performed by: HOSPITALIST

## 2020-11-22 PROCEDURE — 63600000 HC DRUGS/DETAIL CODE: Performed by: INTERNAL MEDICINE

## 2020-11-22 PROCEDURE — 25000000 HC PHARMACY GENERAL: Performed by: INTERNAL MEDICINE

## 2020-11-22 PROCEDURE — 80053 COMPREHEN METABOLIC PANEL: CPT | Performed by: STUDENT IN AN ORGANIZED HEALTH CARE EDUCATION/TRAINING PROGRAM

## 2020-11-22 PROCEDURE — 63700000 HC SELF-ADMINISTRABLE DRUG: Performed by: STUDENT IN AN ORGANIZED HEALTH CARE EDUCATION/TRAINING PROGRAM

## 2020-11-22 PROCEDURE — 99239 HOSP IP/OBS DSCHRG MGMT >30: CPT | Mod: CR | Performed by: HOSPITALIST

## 2020-11-22 PROCEDURE — 83735 ASSAY OF MAGNESIUM: CPT | Performed by: HOSPITALIST

## 2020-11-22 RX ORDER — HYDRALAZINE HYDROCHLORIDE 25 MG/1
25 TABLET, FILM COATED ORAL EVERY 8 HOURS
Qty: 90 TABLET | Refills: 0 | Status: SHIPPED | OUTPATIENT
Start: 2020-11-22 | End: 2020-12-22

## 2020-11-22 RX ORDER — GUAIFENESIN 600 MG/1
600 TABLET, EXTENDED RELEASE ORAL 2 TIMES DAILY
Qty: 20 TABLET | Refills: 0 | Status: SHIPPED | OUTPATIENT
Start: 2020-11-22 | End: 2020-12-02

## 2020-11-22 RX ADMIN — ALLOPURINOL 300 MG: 100 TABLET ORAL at 08:57

## 2020-11-22 RX ADMIN — METOPROLOL TARTRATE 25 MG: 25 TABLET, FILM COATED ORAL at 08:57

## 2020-11-22 RX ADMIN — HYDRALAZINE HYDROCHLORIDE 25 MG: 50 TABLET ORAL at 06:07

## 2020-11-22 RX ADMIN — GABAPENTIN 300 MG: 300 CAPSULE ORAL at 08:57

## 2020-11-22 RX ADMIN — GUAIFENESIN 600 MG: 600 TABLET ORAL at 08:57

## 2020-11-22 RX ADMIN — DEXAMETHASONE SODIUM PHOSPHATE 6 MG: 4 INJECTION, SOLUTION INTRA-ARTICULAR; INTRALESIONAL; INTRAMUSCULAR; INTRAVENOUS; SOFT TISSUE at 12:57

## 2020-11-22 RX ADMIN — INSULIN ASPART 3 UNITS: 100 INJECTION, SOLUTION INTRAVENOUS; SUBCUTANEOUS at 08:58

## 2020-11-22 RX ADMIN — INSULIN ASPART 3 UNITS: 100 INJECTION, SOLUTION INTRAVENOUS; SUBCUTANEOUS at 12:55

## 2020-11-22 RX ADMIN — LISINOPRIL: 20 TABLET ORAL at 08:57

## 2020-11-22 RX ADMIN — REMDESIVIR 100 MG: 100 INJECTION, POWDER, LYOPHILIZED, FOR SOLUTION INTRAVENOUS at 12:50

## 2020-11-22 RX ADMIN — CYANOCOBALAMIN TAB 1000 MCG 1000 MCG: 1000 TAB at 08:57

## 2020-11-22 RX ADMIN — POTASSIUM CHLORIDE 20 MEQ: 750 TABLET, EXTENDED RELEASE ORAL at 08:58

## 2020-11-22 RX ADMIN — ASPIRIN 81 MG: 81 TABLET, COATED ORAL at 08:57

## 2020-11-22 RX ADMIN — Medication 125 MCG: at 08:57

## 2020-11-22 NOTE — PROGRESS NOTES
11/22/2020 1:58 PM - Chart reviewed and noted that pt has a d/c to home order.  Pt gave verbal consent via phone due to COVID 19 infection prevention measures.  Pt denies any d/c needs at this time.  PLAN remains for pt to go home today no d/c needs.  Tamika hernandez MSW LSW

## 2020-11-22 NOTE — DISCHARGE SUMMARY
[facility] Medicine Service -  Inpatient Discharge Summary        BRIEF OVERVIEW   Admitting Provider: Xander Glynn MD  Attending Provider: Gail Felix MD Attending phys phone: [phone]    PCP: Gucci Castillo [phone]    Admission Date: 11/17/2020  Discharge Date: 11/22/2020     DISCHARGE DIAGNOSES      Primary Discharge Diagnosis  No Principal Problem: There is no principal problem currently on the Problem List. Please update the Problem List and refresh.    Secondary Discharge Diagnoses  Active Hospital Problems    Diagnosis Date Noted   • COVID-19 11/17/2020   • Hypokalemia 11/17/2020   • PRATIK (acute kidney injury) (CMS/HCC) 11/17/2020   • Gout 11/17/2020   • HTN (hypertension) 11/17/2020   • CAD (coronary artery disease) 11/17/2020   • Diabetes (CMS/HCC) 11/17/2020   • HLD (hyperlipidemia) 11/17/2020   • Obesity (BMI 35.0-39.9 without comorbidity) 11/17/2020      Resolved Hospital Problems   No resolved problems to display.       Problem List on Day of Discharge  Obesity (BMI 35.0-39.9 without comorbidity)  Assessment & Plan  .    HLD (hyperlipidemia)  Assessment & Plan  During home statin    Diabetes (CMS/[facility])  Assessment & Plan  Insulin sliding scale  POCG  Holding oral hypoglycemic agents    Blood sugar is on the high side, received a sliding scale, recommend resume home dose insulin and oral medications, later blood sugar every day and follow-up PCP for further management of diabetes    CAD (coronary artery disease)  Assessment & Plan  Continue home cardiac regimen, aspirin, statin, beta-blocker    HTN (hypertension)  Assessment & Plan  Continue home antihypertensives    11/18  Uncontrolled  Dc IVF  Add Hydralazin  Monitoring BP closely    Gout  Assessment & Plan  Continue home allopurinol    PRATIK (acute kidney injury) (CMS/[facility])  Assessment & Plan  No reported hx of ckd  Poor po intake  IVF  Hold hctz for today  Trend    11/18  Eating well, dc IVF    11-21 and after   Cr wnl      Hypokalemia  Assessment & Plan  Replete 20mg oral  Bmp tomorrow    COVID-19  Assessment & Plan  Known + contact: Coworker  Sx: sob, cough, chills, nausea, diarrhea  Cxr: consistent with CoVid pneumonia  CoVid test: positive    Admit to tele  Continuous pulse ox  Continue CoVid protocol   Continue bronchodilators prn   Encouraged to self prone if able to tolerate  O2 to maintain SpO2>90%  ID consulted    11/18  CXR suggesting Covid PNA  Waiting for ID's consult and Treatment decision  Add Mucinex  Following O2 status.    11/19-20  Stable to improving, on remdesivir plus Decadron    11-21 and after   Day 4/5  remdesivir plus Decadron    SUMMARY OF HOSPITALIZATION      HPI per H/P   Amari Gan is a 70 y.o. male with a past medical history of CAD, gout, hypertension, hyperlipidemia, diabetes who presents with a cc of shortness of breath.  Patient reports he had exposure to Covid positive coworker approximately 2 weeks ago recently discovering that they were positive.  Patient has a approximately 4-day history of shortness of breath with worsening fatigue.  Patient also has associated fevers, chills, diarrhea.  Denies having any myalgias, arthralgias, anosmia, ageusia.      In the ED the patient was found to be saturating at 91% on room air, also tachypneic.  Chest x-ray was consistent for Covid pneumonia.    Hospital Course    Infectious disease was consulted, patient was placed on remdesivir and Decadron, patient response therapy well, patient finished a course of remdesivir and Decadron therapy total 5 days, patient feeling better, patient breathing room air, she was seen and examined in the morning, patient has no any complaints, feels stronger, vital signs stable, lab reviewed unremarkable, patient medically ready for discharge, patient discharged in stable condition, I called and updated patient wife, all questions answered.      Vitals:    11/21/20 2100 11/22/20 0605 11/22/20 0759 11/22/20 0906   BP:  139/90 (!) 173/87 (!) 169/79 (!) 155/74   BP Location: Left upper arm Left upper arm Left upper arm Left upper arm   Patient Position: Lying Lying Sitting Sitting   Pulse: 74 75 65 66   Resp: 20 20 20 20   Temp: 36.2 °C (97.2 °F)  36.4 °C (97.5 °F) 36.4 °C (97.5 °F)   TempSrc: Oral  Oral Oral   SpO2: 95% 97% 94% 95%   Weight:       Height:           Exam on Day of Discharge  General Appearance:  Awake, Alert, no distress     Head:  Normocephalic, without obvious abnormality, atraumatic   Neck: Supple      Lungs:   Clear to auscultation bilaterally  respirations unlabored  no rales  no wheezing     Heart:  Regular rhythm  S1 and S2 normal  no murmur, rub or gallop     Abdomen:   Soft, non-tender, no masses, no organomegaly     Vascular: Pulses 2+ and symmetric all extremities     Extremities: no calf tenderness      Behavior/Emotional: Appropriate, cooperative     Consults During Admission  IP CONSULT TO INFECTIOUS DISEASE    DISCHARGE MEDICATIONS        Medication List      START taking these medications    guaiFENesin 600 mg 12 hr tablet  Commonly known as: MUCINEX  Take 1 tablet (600 mg total) by mouth 2 (two) times a day for 10 days.  Dose: 600 mg     hydrALAZINE 25 mg tablet  Commonly known as: APRESOLINE  Take 1 tablet (25 mg total) by mouth every 8 (eight) hours.  Dose: 25 mg        CONTINUE taking these medications    allopurinoL 300 mg tablet  Commonly known as: ZYLOPRIM  Take 300 mg by mouth daily.  Dose: 300 mg     amLODIPine-benazepriL 5-20 mg per capsule  Commonly known as: LOTREL 5-20  Take 1 capsule by mouth daily.  Dose: 1 capsule     aspirin 81 mg enteric coated tablet  Take 81 mg by mouth daily.  Dose: 81 mg     cyanocobalamin 1,000 mcg tablet  Commonly known as: VITAMIN B12  Take 1,000 mcg by mouth daily.  Dose: 1,000 mcg     gabapentin 300 mg capsule  Commonly known as: NEURONTIN  Take 300 mg by mouth 2 (two) times a day.  Dose: 300 mg     hydrochlorothiazide 25 mg tablet  Commonly known as:  HYDRODIURIL  Take 25 mg by mouth daily.  Dose: 25 mg     INSULIN GLARGINE U-300 CONC SUBQ  Inject 18 Units under the skin daily.  Dose: 18 Units     liraglutide 0.6 mg/0.1 mL (18 mg/3 mL) injection  Commonly known as: VICTOZA  Inject under the skin daily.     metFORMIN 1,000 mg tablet  Commonly known as: GLUCOPHAGE  Take 500 mg by mouth 2 (two) times a day with meals.  Dose: 500 mg     metoprolol tartrate 25 mg tablet  Commonly known as: LOPRESSOR  Take 25 mg by mouth 2 (two) times a day.  Dose: 25 mg     rosuvastatin 20 mg tablet  Commonly known as: CRESTOR  Take 20 mg by mouth daily.  Dose: 20 mg     VITAMIN D3 5,000 unit (125 mcg) tablet  Take 5,000 Units by mouth daily.  Dose: 5,000 Units  Generic drug: cholecalciferol (vitamin D3)     vitamin D3-folic acid 5,000 unit- 1 mg tablet  Take by mouth.                 PROCEDURES / LABS / IMAGING      Operative Procedures  [unfilled]    Pertinent Labs  CMP Results       11/22/20 11/20/20 11/19/20                    1216 1500 0639          136 138         K 4.6 4.3 4.3         Cl 104 106 106         CO2 22 21 21         Glucose 287 237 138         BUN 32 26 22         Creatinine 1.2 1.2 1.0         Calcium 9.2 8.8 8.9         Anion Gap 11 9 11         AST 43 34 33         ALT 39 25 24         Albumin 2.9 2.8 2.8         EGFR >60.0 >60.0 >60.0         Comment for K at 1216 on 11/22/20: SLIGHT HEMOLYSIS, RESULT MAY BE INCREASED.    Comment for AST at 1216 on 11/22/20: SLIGHT HEMOLYSIS, RESULT MAY BE INCREASED.    Comment for ALT at 1216 on 11/22/20: SLIGHT HEMOLYSIS, RESULT MAY BE INCREASED.        CBC Results       11/22/20 11/20/20 11/19/20                    1216 1500 0639         WBC 7.82 9.26 6.60         RBC 5.19 5.12 5.29         HGB 11.7 11.7 12.1         HCT 40.3 40.2 41.2         MCV 77.6 78.5 77.9         MCH 22.5 22.9 22.9         MCHC 29.0 29.1 29.4          444 405                     Troponin I Results       11/17/20                           1411           Troponin I 0.02                         PT/PTT Results     No lab values to display.        No results found for: BNP, CHOL, TRIG, HDL, LDLCALC, TSH, HGBA1C, DDIMER    Pertinent Imaging  X-ray Chest 1 View    Result Date: 11/17/2020  IMPRESSION: Scattered patchy opacities suspicious for infectious/inflammatory process including that which can be seen with Covid 19 infection.  Follow-up is recommended with PA and lateral view to document resolution.       OUTPATIENT  FOLLOW-UP / REFERRALS / PENDING TESTS      Important Issues to Address in Follow-Up  Please follow up with PCP in One week       DISCHARGE DISPOSITION      Disposition:     This patient note has been dictated using speech recognition software. Inadvertent speech recognition errors should be disregarded. Please do not hesitate to call [facility] [place] office for clarifications.

## 2020-11-22 NOTE — PLAN OF CARE
Plan of Care Review  Plan of Care Reviewed With: patient  Progress: improving  Outcome Summary: Pt sleeping comfortably throughout night.  Room air continued, pt satting 93-96%.  Indepdent in room, no symptoms reported.  Possible dc today, per completing of Remdesivir yesterday. Call bell within reach, bed in low position, belongings at bedside.

## 2020-11-22 NOTE — DISCHARGE INSTRUCTIONS
Please follow up with PCP in one week after discharge   While you were hospitalized you were tested for COVID-19/Coronavirus and the results were positive (meaning you do/did have COVID-19/Coronavirus).  The Health Department will be in touch with you.  If they do not call, please contact them using the appropriate phone number below.     After discharge the [facility] requires you to remain in quarantine (away from others) for at least 7 days after your symptoms started PLUS at least 3 days (72 hours) have passed since you were better (meaning no fever AND improvement in respiratory symptoms (e.g., cough, shortness of breath).  If you are a healthcare provider please ensure you check with the Department of Health before removing yourself from quarantine.     The [place] Centers for Disease Control provides us with instructions of how you should be quarantined.  These instructions can also be found at: https://www.cdc.gov/coronavirus/2019-ncov/downloads/sick-with-2019-nCoV-fact-sheet.pdf     • Stay home except to get medical care. You should restrict activities outside your home, except for getting medical care. Do not go to work, school, or public areas. Avoid using public transportation, ride-sharing, or taxis.  • Separate yourself from other people and animals in your home. People: As much as possible, you should stay in a specific room and away from other people in your home. Also, you should use a separate bathroom, if available. Animals: Do not handle pets or other animals while sick.  • Call ahead before visiting your doctor. If you have a medical appointment, call the healthcare provider and tell them that you have or may have COVID-19. This will help the healthcare provider's office take steps to keep other people from getting infected or exposed.  • Wear a facemask: You should wear a facemask when you are around other people (e.g., sharing a room or vehicle) or pets and before you enter a  healthcare provider's office. If you are not able to wear a facemask (for example, because it causes trouble breathing), then people who live with you should not stay in the same room with you, or they should wear a facemask if they enter your room.  • Cover your coughs and sneezes. Cover your mouth and nose with a tissue when you cough or sneeze. Throw used tissues in a lined trash can; immediately wash your hands with soap and water for at least 20 seconds or clean your hands with an alcohol-based hand  that contains at least 60-95% alcohol covering all surfaces of your hands and rubbing them together until they feel dry. Soap and water should be used preferentially if hands are visibly dirty  • Avoid sharing personal household items. You should not share dishes, drinking glasses, cups, eating utensils, towels, or bedding with other people or pets in your home. After using these items, they should be washed thoroughly with soap and water.  • Clean your hands often. Wash your hands often with soap and water for at least 20 seconds. If soap and water are not available, clean your hands with an alcohol-based hand  that contains at least 60% alcohol, covering all surfaces of your hands and rubbing them together until they feel dry. Soap and water should be used preferentially if hands are visibly dirty. Avoid touching your eyes, nose, and mouth with unwashed hands.  • Clean all ?igh-touch” surfaces every day. High touch surfaces include counters, tabletops, doorknobs, bathroom fixtures, toilets, phones, keyboards, tablets, and bedside tables. Also, clean any surfaces that may have blood, stool, or body fluids on them. Use a household cleaning spray or wipe, according to the label instructions. Labels contain instructions for safe and effective use of the cleaning product including precautions you should take when applying the product, such as wearing gloves and making sure you have good ventilation  during use of the product.  • Monitor your symptoms. Seek prompt medical attention if your illness is worsening (e.g., difficulty breathing). Before seeking care, call your healthcare provider and tell them that you have, or are being evaluated for, COVID-19. Put on a facemask before you enter the facility. These steps will help the healthcare provider's office to keep other people in the office or waiting room from getting infected or exposed.  • If you have a medical emergency and need to call 911, notify the dispatch personnel that you have, or are being evaluated for COVID-19. If possible, put on a facemask before emergency medical services arrive.        If you notice any worsening shortness of breath, light-headedness, confusion, extreme fatigue please call you doctor or return to the hospital immediately.      For further information or if you are not called by the Health Department:     For All [place] Residents  [place] Department of Health Information webpage: https://www.[facility].pa.gov/providers/Providers/Pages/Coronavirus-2020.aspx  [place] Department of Health COVID-19 phone number: 8-196-[employer] (1-962.317.7398)     For All [facility] webpage: https://www.[facility].gov/services/mental-physical-health/environmental-health-hazards/covid-19/  [facility] phone number: 1-275.115.7233     For All [facility] Residents  [facility] webpage: [email or web address]  [facility] phone number: [phone]     For Residents of [facility] Information webpage: https://www.[facility].[place].gov/providers/Providers/Pages/Coronavirus-2020.aspx  [place] Department of Health COVID-19 phone number: 8-757-[employer] (1-304.103.4884)     For all [facility]  webpage: [email or web address]://[email or web address]  [facility] phone number: 1-385.454.8936     [place] Center for Disease Control (CDC) COVID-19 Information Webpage: https://www.cdc.gov/coronavirus/2019-nCoV/index.html

## 2020-11-22 NOTE — PLAN OF CARE
Problem: Adult Inpatient Plan of Care  Goal: Plan of Care Review  Outcome: Met  Goal: Patient-Specific Goal (Individualization)  Outcome: Met  Goal: Absence of Hospital-Acquired Illness or Injury  Outcome: Met  Goal: Optimal Comfort and Wellbeing  Outcome: Met  Goal: Readiness for Transition of Care  Outcome: Met  Goal: Rounds/Family Conference  Outcome: Met     Problem: Infection  Goal: Infection Symptom Resolution  Outcome: Met     Problem: Fall Injury Risk  Goal: Absence of Fall and Fall-Related Injury  Outcome: Met   Plan of Care Review  Plan of Care Reviewed With: patient

## 2020-11-24 ENCOUNTER — PATIENT OUTREACH (OUTPATIENT)
Dept: CASE MANAGEMENT | Facility: CLINIC | Age: 70
End: 2020-11-24

## 2020-11-24 NOTE — PROGRESS NOTES
Spoke to pt states he got covid from a co worker  wife has symptoms as well cm reviewed stoplight poc monitor progress     NAME: Amari Gan    MRN: [id number]    YOB: 1950    Event Review:    Assessment completed with:: Patient  Patient stated reason for hospitalization: covid  Discharge Diagnosis: covid  Patient readmitted in the last 30 days: No              Discharging Facility: [facility]  Date of Admission: 11/17/20  Date of Discharge: 11/22/20           Reviewed AVS (Discharge Instructions)?: Yes     What is the patient's perception of their health status since discharge? : Improving    Medication Review:    Medication Review: Yes     Reported by:: Patient  Any new medications prescribed at discharge?: Yes  Is the patient having any side effects they believe may be caused by any medication additions or changes?: No  New prescriptions filled?: Yes     Do you have enough of your regularly prescribed medications?: Yes     Was a medication discrepancy indentified?: No        Reconciled the current and discharge medications: Yes    Home Care Services:    Home Care Agency: na                    Post-Discharge Durable Medical Equipment:               Appointment Scheduling:    TCM Appointment Types: POPPY 14 Day           Patient Scheduling Dispositions: Pt will call office to schedule     Interventions/ Care Coordination:               Home Management:    Living Arrangement: Spouse  Support System:: Spouse  Type of Residence: 2 story house    Existing DME:                     Pain Assessment:                     Diet/Nutrition:

## 2020-12-01 ENCOUNTER — PATIENT OUTREACH (OUTPATIENT)
Dept: CASE MANAGEMENT | Facility: CLINIC | Age: 70
End: 2020-12-01

## 2020-12-01 NOTE — PROGRESS NOTES
Spoke to pt states he doing well sees pcp next week is back at work Cm reviewed stoplight poc monitor progress

## 2020-12-02 ENCOUNTER — LAB (OUTPATIENT)
Dept: LAB | Facility: CLINIC | Age: 70
End: 2020-12-02
Payer: MEDICARE

## 2020-12-07 ENCOUNTER — OFFICE VISIT (OUTPATIENT)
Dept: FAMILY MEDICINE CLINIC | Facility: CLINIC | Age: 70
End: 2020-12-07
Payer: MEDICARE

## 2020-12-07 VITALS
HEART RATE: 85 BPM | WEIGHT: 248 LBS | DIASTOLIC BLOOD PRESSURE: 70 MMHG | HEIGHT: 69 IN | SYSTOLIC BLOOD PRESSURE: 138 MMHG | BODY MASS INDEX: 36.73 KG/M2 | OXYGEN SATURATION: 98 % | TEMPERATURE: 97.6 F

## 2020-12-07 DIAGNOSIS — M1A.3790 CHRONIC GOUT DUE TO RENAL IMPAIRMENT INVOLVING TOE WITHOUT TOPHUS, UNSPECIFIED LATERALITY: ICD-10-CM

## 2020-12-07 DIAGNOSIS — E78.2 HYPERLIPEMIA, MIXED: ICD-10-CM

## 2020-12-07 DIAGNOSIS — E11.22 CONTROLLED TYPE 2 DIABETES MELLITUS WITH STAGE 3 CHRONIC KIDNEY DISEASE, WITH LONG-TERM CURRENT USE OF INSULIN (HCC): Primary | ICD-10-CM

## 2020-12-07 DIAGNOSIS — N17.9 AKI (ACUTE KIDNEY INJURY) (HCC): ICD-10-CM

## 2020-12-07 DIAGNOSIS — Z79.4 CONTROLLED TYPE 2 DIABETES MELLITUS WITH STAGE 3 CHRONIC KIDNEY DISEASE, WITH LONG-TERM CURRENT USE OF INSULIN (HCC): Primary | ICD-10-CM

## 2020-12-07 DIAGNOSIS — N18.30 CONTROLLED TYPE 2 DIABETES MELLITUS WITH STAGE 3 CHRONIC KIDNEY DISEASE, WITH LONG-TERM CURRENT USE OF INSULIN (HCC): Primary | ICD-10-CM

## 2020-12-07 DIAGNOSIS — U07.1 COVID-19 VIRUS INFECTION: ICD-10-CM

## 2020-12-07 PROBLEM — R10.13 ABDOMINAL PAIN, EPIGASTRIC: Status: RESOLVED | Noted: 2020-08-10 | Resolved: 2020-12-07

## 2020-12-07 PROCEDURE — 99214 OFFICE O/P EST MOD 30 MIN: CPT | Performed by: INTERNAL MEDICINE

## 2020-12-08 ENCOUNTER — PATIENT OUTREACH (OUTPATIENT)
Dept: CASE MANAGEMENT | Facility: CLINIC | Age: 70
End: 2020-12-08

## 2020-12-12 DIAGNOSIS — I25.810 CORONARY ARTERY DISEASE INVOLVING CORONARY BYPASS GRAFT OF NATIVE HEART WITHOUT ANGINA PECTORIS: ICD-10-CM

## 2020-12-15 ENCOUNTER — PATIENT OUTREACH (OUTPATIENT)
Dept: CASE MANAGEMENT | Facility: CLINIC | Age: 70
End: 2020-12-15

## 2020-12-26 DIAGNOSIS — E78.2 HYPERLIPEMIA, MIXED: ICD-10-CM

## 2020-12-26 DIAGNOSIS — I10 ESSENTIAL HYPERTENSION: ICD-10-CM

## 2020-12-28 RX ORDER — ROSUVASTATIN CALCIUM 20 MG/1
TABLET, COATED ORAL
Qty: 90 TABLET | Refills: 1 | Status: SHIPPED | OUTPATIENT
Start: 2020-12-28 | End: 2021-07-07

## 2020-12-28 RX ORDER — HYDROCHLOROTHIAZIDE 25 MG/1
TABLET ORAL
Qty: 90 TABLET | Refills: 1 | Status: SHIPPED | OUTPATIENT
Start: 2020-12-28 | End: 2021-07-07

## 2020-12-29 ENCOUNTER — PATIENT OUTREACH (OUTPATIENT)
Dept: CASE MANAGEMENT | Facility: CLINIC | Age: 70
End: 2020-12-29

## 2020-12-29 NOTE — PROGRESS NOTES
Spoke to pt states he doing well saw pcp meds are the same Cm reviewed stoplight poc monitor progress

## 2021-01-12 ENCOUNTER — PATIENT OUTREACH (OUTPATIENT)
Dept: CASE MANAGEMENT | Facility: CLINIC | Age: 71
End: 2021-01-12

## 2021-01-26 ENCOUNTER — PATIENT OUTREACH (OUTPATIENT)
Dept: CASE MANAGEMENT | Facility: CLINIC | Age: 71
End: 2021-01-26

## 2021-02-03 DIAGNOSIS — I10 ESSENTIAL HYPERTENSION: ICD-10-CM

## 2021-02-03 RX ORDER — AMLODIPINE BESYLATE AND BENAZEPRIL HYDROCHLORIDE 5; 20 MG/1; MG/1
CAPSULE ORAL
Qty: 90 CAPSULE | Refills: 3 | Status: SHIPPED | OUTPATIENT
Start: 2021-02-03 | End: 2022-01-28

## 2021-03-04 ENCOUNTER — TRANSCRIBE ORDERS (OUTPATIENT)
Dept: LAB | Facility: CLINIC | Age: 71
End: 2021-03-04

## 2021-03-04 ENCOUNTER — APPOINTMENT (OUTPATIENT)
Dept: LAB | Facility: CLINIC | Age: 71
End: 2021-03-04
Payer: MEDICARE

## 2021-03-08 ENCOUNTER — OFFICE VISIT (OUTPATIENT)
Dept: FAMILY MEDICINE CLINIC | Facility: CLINIC | Age: 71
End: 2021-03-08
Payer: MEDICARE

## 2021-03-08 VITALS
SYSTOLIC BLOOD PRESSURE: 138 MMHG | BODY MASS INDEX: 37.18 KG/M2 | HEART RATE: 74 BPM | DIASTOLIC BLOOD PRESSURE: 82 MMHG | HEIGHT: 69 IN | TEMPERATURE: 97.8 F | OXYGEN SATURATION: 97 % | WEIGHT: 251 LBS

## 2021-03-08 DIAGNOSIS — Z79.4 CONTROLLED TYPE 2 DIABETES MELLITUS WITH DIABETIC NEUROPATHY, WITH LONG-TERM CURRENT USE OF INSULIN (HCC): ICD-10-CM

## 2021-03-08 DIAGNOSIS — N18.30 CONTROLLED TYPE 2 DIABETES MELLITUS WITH STAGE 3 CHRONIC KIDNEY DISEASE, WITH LONG-TERM CURRENT USE OF INSULIN (HCC): Primary | ICD-10-CM

## 2021-03-08 DIAGNOSIS — Z79.4 CONTROLLED TYPE 2 DIABETES MELLITUS WITH STAGE 3 CHRONIC KIDNEY DISEASE, WITH LONG-TERM CURRENT USE OF INSULIN (HCC): Primary | ICD-10-CM

## 2021-03-08 DIAGNOSIS — E11.22 CONTROLLED TYPE 2 DIABETES MELLITUS WITH STAGE 3 CHRONIC KIDNEY DISEASE, WITH LONG-TERM CURRENT USE OF INSULIN (HCC): Primary | ICD-10-CM

## 2021-03-08 DIAGNOSIS — E11.40 CONTROLLED TYPE 2 DIABETES MELLITUS WITH DIABETIC NEUROPATHY, WITH LONG-TERM CURRENT USE OF INSULIN (HCC): ICD-10-CM

## 2021-03-08 DIAGNOSIS — E78.2 HYPERLIPEMIA, MIXED: ICD-10-CM

## 2021-03-08 DIAGNOSIS — E66.01 CLASS 2 SEVERE OBESITY DUE TO EXCESS CALORIES WITH SERIOUS COMORBIDITY AND BODY MASS INDEX (BMI) OF 37.0 TO 37.9 IN ADULT (HCC): ICD-10-CM

## 2021-03-08 DIAGNOSIS — N18.31 STAGE 3A CHRONIC KIDNEY DISEASE (HCC): ICD-10-CM

## 2021-03-08 DIAGNOSIS — Z23 ENCOUNTER FOR IMMUNIZATION: ICD-10-CM

## 2021-03-08 DIAGNOSIS — D35.2 PITUITARY MACROADENOMA (HCC): ICD-10-CM

## 2021-03-08 DIAGNOSIS — M1A.3790 CHRONIC GOUT DUE TO RENAL IMPAIRMENT INVOLVING TOE WITHOUT TOPHUS, UNSPECIFIED LATERALITY: ICD-10-CM

## 2021-03-08 DIAGNOSIS — R41.3 MEMORY LOSS: ICD-10-CM

## 2021-03-08 PROBLEM — U07.1 COVID-19 VIRUS INFECTION: Status: RESOLVED | Noted: 2020-11-16 | Resolved: 2021-03-08

## 2021-03-08 PROCEDURE — G0439 PPPS, SUBSEQ VISIT: HCPCS | Performed by: INTERNAL MEDICINE

## 2021-03-08 PROCEDURE — 1123F ACP DISCUSS/DSCN MKR DOCD: CPT | Performed by: INTERNAL MEDICINE

## 2021-03-08 PROCEDURE — 99214 OFFICE O/P EST MOD 30 MIN: CPT | Performed by: INTERNAL MEDICINE

## 2021-03-08 RX ORDER — HYDRALAZINE HYDROCHLORIDE 25 MG/1
25 TABLET, FILM COATED ORAL 3 TIMES DAILY
COMMUNITY
Start: 2020-11-22 | End: 2021-03-08

## 2021-03-08 NOTE — ASSESSMENT & PLAN NOTE
We again reviewed the results of his MRI done here at [place] [address] and also the comparison with the images done at [facility]  Our radiologist concluded that there is no current evidence of a pituitary adenoma  Were going to defer any further MRIs at this point

## 2021-03-08 NOTE — ASSESSMENT & PLAN NOTE
Lab Results   Component Value Date    EGFR 52 03/04/2021    EGFR 49 12/02/2020    EGFR 39 08/06/2020    CREATININE 1 38 (H) 03/04/2021    CREATININE 1 45 (H) 12/02/2020    CREATININE 1 74 (H) 08/06/2020   Renal function has improved  We discussed avoiding medications that could cause is renal injury such as naproxen and ibuprofen  He is not taking either of these  We will try to avoid nephrotoxic medications and continue to control his blood pressure and diabetes

## 2021-03-08 NOTE — ASSESSMENT & PLAN NOTE
His difficulty is primarily with recalling people's names  He does not seem to have any short-term memory loss  He scored 5 on his mini cog today  I would just recommend observation for now

## 2021-03-08 NOTE — ASSESSMENT & PLAN NOTE
Lab Results   Component Value Date    HGBA1C 7 0 (H) 03/04/2021   His A1c continues to trend down    For now, would continue his Toujeo at 26 units every evening, Victoza 1 2 mg daily, and metformin 1000 mg twice a day

## 2021-03-08 NOTE — PROGRESS NOTES
Assessment/Plan:    Problem List Items Addressed This Visit        Endocrine    Pituitary macroadenoma [facility])     We again reviewed the results of his MRI done here at [facility] and also the comparison with the images done at [facility]  Our radiologist concluded that there is no current evidence of a pituitary adenoma  Were going to defer any further MRIs at this point  Controlled type 2 diabetes mellitus with diabetic neuropathy, with long-term current use of insulin ([facility])       Lab Results   Component Value Date    HGBA1C 7 0 (H) 03/04/2021   His A1c continues to trend down  For now, would continue his Toujeo at 26 units every evening, Victoza 1 2 mg daily, and metformin 1000 mg twice a day         Type 2 diabetes mellitus, controlled, with renal complications ([facility] [address] ) - Primary       Lab Results   Component Value Date    HGBA1C 7 0 (H) 03/04/2021   See discussions under diabetes with neuropathy and stage IIIA kidney disease  Musculoskeletal and Integument    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     No gout attacks on allopurinol 300 mg daily  Continue current regimen  Relevant Orders    Uric acid       Genitourinary    Stage 3a chronic kidney disease     Lab Results   Component Value Date    EGFR 52 03/04/2021    EGFR 49 12/02/2020    EGFR 39 08/06/2020    CREATININE 1 38 (H) 03/04/2021    CREATININE 1 45 (H) 12/02/2020    CREATININE 1 74 (H) 08/06/2020   Renal function has improved  We discussed avoiding medications that could cause is renal injury such as naproxen and ibuprofen  He is not taking either of these  We will try to avoid nephrotoxic medications and continue to control his blood pressure and diabetes              Other    Hyperlipemia, mixed     Lab Results   Component Value Date    CHOLESTEROL 123 03/04/2021    TRIG 238 (H) 03/04/2021    HDL 38 (L) 03/04/2021    LDLCALC 37 03/04/2021   He has an excellent LDL on rosuvastatin 20 mg daily   Triglycerides were slightly elevated but should improve with weight loss  Class 2 severe obesity due to excess calories with serious comorbidity and body mass index (BMI) of 37 0 to 37 9 in [facility])     Encouraged him to redouble his dietary efforts by reducing his carbohydrate intake and also increase his intake of fruits and vegetables  Memory loss     His difficulty is primarily with recalling people's names  He does not seem to have any short-term memory loss  He scored 5 on his mini cog today  I would just recommend observation for now  Other Visit Diagnoses     Encounter for immunization          BMI Counseling: Body mass index is 37 07 kg/m²  The BMI is above normal  Nutrition recommendations include encouraging healthy choices of fruits and vegetables and moderation in carbohydrate intake  Chief Complaint     Medicare Wellness Visit; Care Gap Request; foot exam          Patient ID: Jolene Shelton is a 78 y o  male who returns for routine follow up  His blood sugars are around 130 in the mornings  He doesn't check other times of the day  He hasn't had any exertional chest pain or shortness of breath  He has not lost any weight since the last visit and admits that he needs to do a better job of limiting his carbohydrates  He has not had any gout attacks for some time  He mentioned that he is having trouble remembering people's names over the past 6 months  He is concerned because his grandmother had some type of dementia  He does not seem to have a problem with short-term memory        Objective:    /82 (BP Location: Right arm, Patient Position: Sitting, Cuff Size: Large)   Pulse 74   Temp 97 8 °F (36 6 °C)   Ht 5' 9" (1 753 m)   Wt 114 kg (251 lb)   SpO2 97%   BMI 37 07 kg/m²     Wt Readings from Last 3 Encounters:   03/08/21 114 kg (251 lb)   12/07/20 112 kg (248 lb)   11/17/20 114 kg (251 lb)          Physical Exam    General: Well-developed, well-nourished, in no acute distress  Cardiac:  Regular rate and rhythm, no murmur, gallop, or rub  There is no JVD or HJR  Lungs:  Clear to auscultation and percussion  Abdomen:  Soft, nontender, normoactive bowel sounds, no palpable masses, no hepatosplenomegaly  Extremities:  No clubbing, cyanosis, or edema  Diabetic Foot Exam    Patient's shoes and socks removed  Right Foot/Ankle   Right Foot Inspection  Skin Exam: skin normal and skin intact no dry skin, no warmth, no callus, no erythema, no maceration, no abnormal color, no pre-ulcer, no ulcer and no callus                            Sensory       Monofilament testing: intact  Vascular    The right DP pulse is 2+  The right PT pulse is 2+  Left Foot/Ankle  Left Foot Inspection  Skin Exam: skin normal and skin intactno dry skin, no warmth, no erythema, no maceration, normal color, no pre-ulcer, no ulcer and no callus                                         Sensory       Monofilament: intact  Vascular    The left DP pulse is 2+  The left PT pulse is 2+  Assign Risk Category:  No deformity present; No loss of protective sensation;  No weak pulses       Risk: 0

## 2021-03-08 NOTE — ASSESSMENT & PLAN NOTE
Lab Results   Component Value Date    HGBA1C 7 0 (H) 03/04/2021   See discussions under diabetes with neuropathy and stage IIIA kidney disease

## 2021-03-08 NOTE — ASSESSMENT & PLAN NOTE
Encouraged him to redouble his dietary efforts by reducing his carbohydrate intake and also increase his intake of fruits and vegetables

## 2021-03-08 NOTE — PROGRESS NOTES
Eliezer Westbrook is here for his Subsequent Wellness visit  Last Medicare Wellness visit information reviewed, patient interviewed and updates made to the record today  Health Risk Assessment:   Patient feels that their physical health rating is slightly better  Eyesight was rated as slightly worse  Hearing was rated as same  Patient feels that their emotional and mental health rating is same  Pain experienced in the last 7 days has been none  Patient states that he has experienced no weight loss or gain in last 6 months  Depression Screening:   PHQ-2 Score: 0      Fall Risk Screening: In the past year, patient has experienced: no history of falling in past year      Home Safety:  Patient does not have trouble with stairs inside or outside of their home  Patient has working smoke alarms and has working carbon monoxide detector  Home safety hazards include: none  Nutrition:   Current diet is Diabetic and Limited junk food  Medications:   Patient is currently taking over-the-counter supplements  OTC medications include: see medication list  Patient is able to manage medications  Activities of Daily Living (ADLs)/Instrumental Activities of Daily Living (IADLs):   Walk and transfer into and out of bed and chair?: Yes  Dress and groom yourself?: Yes    Bathe or shower yourself?: Yes    Feed yourself? Yes  Do your laundry/housekeeping?: Yes  Manage your money, pay your bills and track your expenses?: Yes  Make your own meals?: Yes    Do your own shopping?: Yes    Previous Hospitalizations:   Any hospitalizations or ED visits within the last 12 months?: Yes    How many hospitalizations have you had in the last year?: 1-2    Hospitalization Comments: Was hospitalized with COVID in November 2020  Advance Care Planning:   Living will: No    Durable POA for healthcare:  Yes    Advanced directive: Yes    Advanced directive counseling given: Yes    Five wishes given: Yes    End of Life Decisions reviewed with patient: Yes      Comments: Marilee Oreilly chooses comfort care measures in the event of a terminal diagnosis  Cognitive Screening:   Provider or family/friend/caregiver concerned regarding cognition?: Yes  Mini-Cog Score: 5  Interpretation: Mini-Cog Score 3-5: Negative screen for dementia     Cognition Comments: Occasionally forgets names over the past 6 months       PREVENTIVE SCREENINGS      Cardiovascular Screening:    General: Screening Not Indicated and History Lipid Disorder      Diabetes Screening:     General: Screening Not Indicated and History Diabetes      Colorectal Cancer Screening:     General: Screening Current      Abdominal Aortic Aneurysm (AAA) Screening:    Risk factors include: age between 73-69 yo        Lung Cancer Screening:     General: Screening Not Indicated      Hepatitis C Screening:    General: Screening Current

## 2021-03-08 NOTE — ASSESSMENT & PLAN NOTE
Lab Results   Component Value Date    CHOLESTEROL 123 03/04/2021    TRIG 238 (H) 03/04/2021    HDL 38 (L) 03/04/2021    LDLCALC 37 03/04/2021   He has an excellent LDL on rosuvastatin 20 mg daily  Triglycerides were slightly elevated but should improve with weight loss

## 2021-03-26 ENCOUNTER — IMMUNIZATIONS (OUTPATIENT)
Dept: FAMILY MEDICINE CLINIC | Facility: HOSPITAL | Age: 71
End: 2021-03-26

## 2021-03-26 DIAGNOSIS — Z23 ENCOUNTER FOR IMMUNIZATION: Primary | ICD-10-CM

## 2021-03-26 PROCEDURE — 91301 SARS-COV-2 / COVID-19 MRNA VACCINE (MODERNA) 100 MCG: CPT

## 2021-03-26 PROCEDURE — 0011A SARS-COV-2 / COVID-19 MRNA VACCINE (MODERNA) 100 MCG: CPT

## 2021-04-08 DIAGNOSIS — E11.40 CONTROLLED TYPE 2 DIABETES MELLITUS WITH DIABETIC NEUROPATHY, WITH LONG-TERM CURRENT USE OF INSULIN (HCC): ICD-10-CM

## 2021-04-08 DIAGNOSIS — Z79.4 CONTROLLED TYPE 2 DIABETES MELLITUS WITH DIABETIC NEUROPATHY, WITH LONG-TERM CURRENT USE OF INSULIN (HCC): ICD-10-CM

## 2021-04-08 RX ORDER — LIRAGLUTIDE 6 MG/ML
INJECTION SUBCUTANEOUS
Qty: 6 ML | Refills: 5 | Status: SHIPPED | OUTPATIENT
Start: 2021-04-08 | End: 2021-06-28

## 2021-04-14 DIAGNOSIS — E11.40 CONTROLLED TYPE 2 DIABETES MELLITUS WITH DIABETIC NEUROPATHY, WITH LONG-TERM CURRENT USE OF INSULIN (HCC): ICD-10-CM

## 2021-04-14 DIAGNOSIS — Z79.4 CONTROLLED TYPE 2 DIABETES MELLITUS WITH DIABETIC NEUROPATHY, WITH LONG-TERM CURRENT USE OF INSULIN (HCC): ICD-10-CM

## 2021-04-15 RX ORDER — GABAPENTIN 300 MG/1
CAPSULE ORAL
Qty: 90 CAPSULE | Refills: 5 | Status: SHIPPED | OUTPATIENT
Start: 2021-04-15 | End: 2021-10-12 | Stop reason: SDUPTHER

## 2021-04-23 DIAGNOSIS — M1A.3790 CHRONIC GOUT DUE TO RENAL IMPAIRMENT INVOLVING TOE WITHOUT TOPHUS, UNSPECIFIED LATERALITY: ICD-10-CM

## 2021-04-23 RX ORDER — ALLOPURINOL 300 MG/1
TABLET ORAL
Qty: 90 TABLET | Refills: 1 | Status: SHIPPED | OUTPATIENT
Start: 2021-04-23 | End: 2021-10-21

## 2021-04-28 ENCOUNTER — IMMUNIZATIONS (OUTPATIENT)
Dept: FAMILY MEDICINE CLINIC | Facility: HOSPITAL | Age: 71
End: 2021-04-28

## 2021-04-28 DIAGNOSIS — Z23 ENCOUNTER FOR IMMUNIZATION: Primary | ICD-10-CM

## 2021-04-28 PROCEDURE — 91301 SARS-COV-2 / COVID-19 MRNA VACCINE (MODERNA) 100 MCG: CPT

## 2021-04-28 PROCEDURE — 0012A SARS-COV-2 / COVID-19 MRNA VACCINE (MODERNA) 100 MCG: CPT

## 2021-06-03 DIAGNOSIS — Z79.4 CONTROLLED TYPE 2 DIABETES MELLITUS WITH DIABETIC NEUROPATHY, WITH LONG-TERM CURRENT USE OF INSULIN (HCC): ICD-10-CM

## 2021-06-03 DIAGNOSIS — E11.40 CONTROLLED TYPE 2 DIABETES MELLITUS WITH DIABETIC NEUROPATHY, WITH LONG-TERM CURRENT USE OF INSULIN (HCC): ICD-10-CM

## 2021-06-04 ENCOUNTER — TRANSCRIBE ORDERS (OUTPATIENT)
Dept: ADMINISTRATIVE | Facility: HOSPITAL | Age: 71
End: 2021-06-04

## 2021-06-04 ENCOUNTER — APPOINTMENT (OUTPATIENT)
Dept: LAB | Facility: CLINIC | Age: 71
End: 2021-06-04
Payer: MEDICARE

## 2021-06-04 DIAGNOSIS — M1A.3790 CHRONIC GOUT DUE TO RENAL IMPAIRMENT INVOLVING TOE WITHOUT TOPHUS, UNSPECIFIED LATERALITY: ICD-10-CM

## 2021-06-04 LAB
ANION GAP SERPL CALCULATED.3IONS-SCNC: 4 MMOL/L (ref 4–13)
BUN SERPL-MCNC: 18 MG/DL (ref 5–25)
CALCIUM SERPL-MCNC: 9.6 MG/DL (ref 8.3–10.1)
CHLORIDE SERPL-SCNC: 107 MMOL/L (ref 100–108)
CHOLEST SERPL-MCNC: 128 MG/DL (ref 50–200)
CO2 SERPL-SCNC: 30 MMOL/L (ref 21–32)
CREAT SERPL-MCNC: 1.58 MG/DL (ref 0.6–1.3)
EST. AVERAGE GLUCOSE BLD GHB EST-MCNC: 157 MG/DL
GFR SERPL CREATININE-BSD FRML MDRD: 44 ML/MIN/1.73SQ M
GLUCOSE P FAST SERPL-MCNC: 132 MG/DL (ref 65–99)
HBA1C MFR BLD: 7.1 %
HDLC SERPL-MCNC: 38 MG/DL
LDLC SERPL CALC-MCNC: 46 MG/DL (ref 0–100)
NONHDLC SERPL-MCNC: 90 MG/DL
POTASSIUM SERPL-SCNC: 4.7 MMOL/L (ref 3.5–5.3)
SODIUM SERPL-SCNC: 141 MMOL/L (ref 136–145)
TRIGL SERPL-MCNC: 218 MG/DL
URATE SERPL-MCNC: 4.2 MG/DL (ref 4.2–8)

## 2021-06-04 PROCEDURE — 80048 BASIC METABOLIC PNL TOTAL CA: CPT | Performed by: INTERNAL MEDICINE

## 2021-06-04 PROCEDURE — 80061 LIPID PANEL: CPT | Performed by: INTERNAL MEDICINE

## 2021-06-04 PROCEDURE — 83036 HEMOGLOBIN GLYCOSYLATED A1C: CPT | Performed by: INTERNAL MEDICINE

## 2021-06-04 PROCEDURE — 84550 ASSAY OF BLOOD/URIC ACID: CPT

## 2021-06-04 PROCEDURE — 36415 COLL VENOUS BLD VENIPUNCTURE: CPT | Performed by: INTERNAL MEDICINE

## 2021-06-04 RX ORDER — INSULIN GLARGINE 300 U/ML
INJECTION, SOLUTION SUBCUTANEOUS
Qty: 9 ML | Refills: 5 | Status: SHIPPED | OUTPATIENT
Start: 2021-06-04 | End: 2022-07-05

## 2021-06-12 DIAGNOSIS — I25.810 CORONARY ARTERY DISEASE INVOLVING CORONARY BYPASS GRAFT OF NATIVE HEART WITHOUT ANGINA PECTORIS: ICD-10-CM

## 2021-06-28 ENCOUNTER — OFFICE VISIT (OUTPATIENT)
Dept: FAMILY MEDICINE CLINIC | Facility: CLINIC | Age: 71
End: 2021-06-28
Payer: MEDICARE

## 2021-06-28 VITALS
OXYGEN SATURATION: 98 % | SYSTOLIC BLOOD PRESSURE: 136 MMHG | TEMPERATURE: 97.7 F | DIASTOLIC BLOOD PRESSURE: 84 MMHG | WEIGHT: 255.8 LBS | BODY MASS INDEX: 37.89 KG/M2 | HEART RATE: 87 BPM | HEIGHT: 69 IN

## 2021-06-28 DIAGNOSIS — E11.22 CONTROLLED TYPE 2 DIABETES MELLITUS WITH STAGE 3 CHRONIC KIDNEY DISEASE, WITH LONG-TERM CURRENT USE OF INSULIN (HCC): ICD-10-CM

## 2021-06-28 DIAGNOSIS — N18.30 CONTROLLED TYPE 2 DIABETES MELLITUS WITH STAGE 3 CHRONIC KIDNEY DISEASE, WITH LONG-TERM CURRENT USE OF INSULIN (HCC): ICD-10-CM

## 2021-06-28 DIAGNOSIS — E66.01 CLASS 2 SEVERE OBESITY DUE TO EXCESS CALORIES WITH SERIOUS COMORBIDITY AND BODY MASS INDEX (BMI) OF 37.0 TO 37.9 IN ADULT (HCC): ICD-10-CM

## 2021-06-28 DIAGNOSIS — Z79.4 CONTROLLED TYPE 2 DIABETES MELLITUS WITH STAGE 3 CHRONIC KIDNEY DISEASE, WITH LONG-TERM CURRENT USE OF INSULIN (HCC): ICD-10-CM

## 2021-06-28 DIAGNOSIS — Z79.4 CONTROLLED TYPE 2 DIABETES MELLITUS WITH DIABETIC NEUROPATHY, WITH LONG-TERM CURRENT USE OF INSULIN (HCC): Primary | ICD-10-CM

## 2021-06-28 DIAGNOSIS — I25.810 CORONARY ARTERY DISEASE INVOLVING CORONARY BYPASS GRAFT OF NATIVE HEART WITHOUT ANGINA PECTORIS: ICD-10-CM

## 2021-06-28 DIAGNOSIS — E11.40 CONTROLLED TYPE 2 DIABETES MELLITUS WITH DIABETIC NEUROPATHY, WITH LONG-TERM CURRENT USE OF INSULIN (HCC): Primary | ICD-10-CM

## 2021-06-28 DIAGNOSIS — M1A.3790 CHRONIC GOUT DUE TO RENAL IMPAIRMENT INVOLVING TOE WITHOUT TOPHUS, UNSPECIFIED LATERALITY: ICD-10-CM

## 2021-06-28 DIAGNOSIS — E78.2 HYPERLIPEMIA, MIXED: ICD-10-CM

## 2021-06-28 DIAGNOSIS — N18.31 STAGE 3A CHRONIC KIDNEY DISEASE (HCC): ICD-10-CM

## 2021-06-28 PROCEDURE — 99214 OFFICE O/P EST MOD 30 MIN: CPT | Performed by: INTERNAL MEDICINE

## 2021-06-28 RX ORDER — LIRAGLUTIDE 6 MG/ML
1.8 INJECTION SUBCUTANEOUS DAILY
Qty: 9 ML | Refills: 5 | Status: SHIPPED | OUTPATIENT
Start: 2021-06-28 | End: 2022-01-10

## 2021-06-28 NOTE — PROGRESS NOTES
Assessment/Plan:    Problem List Items Addressed This Visit        Endocrine    Controlled type 2 diabetes mellitus with diabetic neuropathy, with long-term current use of insulin ([facility] [address] ) - Primary       Lab Results   Component Value Date    HGBA1C 7 1 (H) 06/04/2021   Hemoglobin A1c is almost at goal   However, we would like to see him under 7  Will increase Victoza to 1 8 mg daily  Continue metformin and Toujeo  Relevant Medications    liraglutide (Victoza) injection    Type 2 diabetes mellitus, controlled, with renal complications ([facility])       Lab Results   Component Value Date    HGBA1C 7 1 (H) 06/04/2021            Relevant Medications    liraglutide (Victoza) injection       Cardiovascular and Mediastinum    Coronary artery disease involving coronary bypass graft of native heart without angina pectoris     No angina  Continue current medications and risk factor modification  Musculoskeletal and Integument    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     No gout attacks for quite some time  Continue allopurinol 3 mg daily  Genitourinary    Stage 3a chronic kidney disease [facility])     Lab Results   Component Value Date    EGFR 44 06/04/2021    EGFR 52 03/04/2021    EGFR 49 12/02/2020    CREATININE 1 58 (H) 06/04/2021    CREATININE 1 38 (H) 03/04/2021    CREATININE 1 45 (H) 12/02/2020   His creatinine is slightly higher than it was 3 months ago  However, he was fasting and did not drink much fluid in the morning  Will continue to follow creatinine  Other    Hyperlipemia, mixed     Lab Results   Component Value Date    CHOLESTEROL 128 06/04/2021    TRIG 218 (H) 06/04/2021    HDL 38 (L) 06/04/2021    LDLCALC 46 06/04/2021    excellent LDL cholesterol on rosuvastatin  His triglycerides should improve with improved diabetic control           Class 2 severe obesity due to excess calories with serious comorbidity and body mass index (BMI) of 37 0 to 37 9 in adult [facility])     Encouraged him to reduce his carbohydrate intake  We are also going to increase his Victoza to see if this helps with weight loss  BMI Counseling: Body mass index is 37 78 kg/m²  The BMI is above normal  Nutrition recommendations include moderation in carbohydrate intake  Chief Complaint     Follow-up          Patient ID: Zackary Gonzales is a 73 y o  male who returns for routine follow up  He nodules that he has not been following his restricted carbohydrate diet as faithfully as he had been  He has not been exercising  He is taking Victoza 1 2 mg daily and had been taking 1 8 mg a few years ago  His dose was reduced in order to reduce the cost   However, he reports having excellent insurance currently  He also takes metformin 1000 mg twice a day and Toujeo 26 units every evening  He has not had any gout attacks for quite some time  No angina reported  Objective:    /84 (BP Location: Left arm, Patient Position: Sitting, Cuff Size: Large)   Pulse 87   Temp 97 7 °F (36 5 °C)   Ht 5' 9" (1 753 m)   Wt 116 kg (255 lb 12 8 oz)   SpO2 98%   BMI 37 78 kg/m²     Wt Readings from Last 3 Encounters:   06/28/21 116 kg (255 lb 12 8 oz)   03/08/21 114 kg (251 lb)   12/07/20 112 kg (248 lb)         Physical Exam    General:  Well-developed, well-nourished, in no acute distress  Cardiac:  Regular rate and rhythm, no murmur, gallop, or rub  There is no JVD or HJR  Lungs:  Clear to auscultation and percussion  Abdomen:  Soft, nontender, normoactive bowel sounds, no palpable masses, no hepatosplenomegaly  Extremities:  No clubbing, cyanosis, or edema

## 2021-06-29 NOTE — ASSESSMENT & PLAN NOTE
Lab Results   Component Value Date    CHOLESTEROL 128 06/04/2021    TRIG 218 (H) 06/04/2021    HDL 38 (L) 06/04/2021    LDLCALC 46 06/04/2021    excellent LDL cholesterol on rosuvastatin  His triglycerides should improve with improved diabetic control

## 2021-06-29 NOTE — ASSESSMENT & PLAN NOTE
Encouraged him to reduce his carbohydrate intake  We are also going to increase his Victoza to see if this helps with weight loss

## 2021-06-29 NOTE — ASSESSMENT & PLAN NOTE
Lab Results   Component Value Date    HGBA1C 7 1 (H) 06/04/2021   Hemoglobin A1c is almost at goal   However, we would like to see him under 7  Will increase Victoza to 1 8 mg daily  Continue metformin and Toujeo

## 2021-07-07 DIAGNOSIS — I10 ESSENTIAL HYPERTENSION: ICD-10-CM

## 2021-07-07 DIAGNOSIS — E78.2 HYPERLIPEMIA, MIXED: ICD-10-CM

## 2021-07-07 RX ORDER — ROSUVASTATIN CALCIUM 20 MG/1
TABLET, COATED ORAL
Qty: 90 TABLET | Refills: 1 | Status: SHIPPED | OUTPATIENT
Start: 2021-07-07 | End: 2021-12-02

## 2021-07-07 RX ORDER — HYDROCHLOROTHIAZIDE 25 MG/1
TABLET ORAL
Qty: 90 TABLET | Refills: 1 | Status: SHIPPED | OUTPATIENT
Start: 2021-07-07 | End: 2022-01-04

## 2021-09-22 ENCOUNTER — APPOINTMENT (OUTPATIENT)
Dept: LAB | Facility: CLINIC | Age: 71
End: 2021-09-22
Payer: MEDICARE

## 2021-09-27 ENCOUNTER — OFFICE VISIT (OUTPATIENT)
Dept: FAMILY MEDICINE CLINIC | Facility: CLINIC | Age: 71
End: 2021-09-27
Payer: MEDICARE

## 2021-09-27 VITALS
WEIGHT: 257 LBS | HEART RATE: 75 BPM | TEMPERATURE: 97.5 F | HEIGHT: 69 IN | DIASTOLIC BLOOD PRESSURE: 66 MMHG | SYSTOLIC BLOOD PRESSURE: 114 MMHG | BODY MASS INDEX: 38.06 KG/M2 | OXYGEN SATURATION: 97 %

## 2021-09-27 DIAGNOSIS — N18.31 STAGE 3A CHRONIC KIDNEY DISEASE (HCC): ICD-10-CM

## 2021-09-27 DIAGNOSIS — Z23 NEEDS FLU SHOT: Primary | ICD-10-CM

## 2021-09-27 DIAGNOSIS — E66.01 CLASS 2 SEVERE OBESITY DUE TO EXCESS CALORIES WITH SERIOUS COMORBIDITY AND BODY MASS INDEX (BMI) OF 37.0 TO 37.9 IN ADULT (HCC): ICD-10-CM

## 2021-09-27 DIAGNOSIS — E11.40 CONTROLLED TYPE 2 DIABETES MELLITUS WITH DIABETIC NEUROPATHY, WITH LONG-TERM CURRENT USE OF INSULIN (HCC): ICD-10-CM

## 2021-09-27 DIAGNOSIS — E11.22 CONTROLLED TYPE 2 DIABETES MELLITUS WITH STAGE 3 CHRONIC KIDNEY DISEASE, WITH LONG-TERM CURRENT USE OF INSULIN (HCC): ICD-10-CM

## 2021-09-27 DIAGNOSIS — E78.2 HYPERLIPEMIA, MIXED: ICD-10-CM

## 2021-09-27 DIAGNOSIS — I25.810 CORONARY ARTERY DISEASE INVOLVING CORONARY BYPASS GRAFT OF NATIVE HEART WITHOUT ANGINA PECTORIS: ICD-10-CM

## 2021-09-27 DIAGNOSIS — N18.30 CONTROLLED TYPE 2 DIABETES MELLITUS WITH STAGE 3 CHRONIC KIDNEY DISEASE, WITH LONG-TERM CURRENT USE OF INSULIN (HCC): ICD-10-CM

## 2021-09-27 DIAGNOSIS — D35.2 PITUITARY MACROADENOMA (HCC): ICD-10-CM

## 2021-09-27 DIAGNOSIS — Z79.4 CONTROLLED TYPE 2 DIABETES MELLITUS WITH STAGE 3 CHRONIC KIDNEY DISEASE, WITH LONG-TERM CURRENT USE OF INSULIN (HCC): ICD-10-CM

## 2021-09-27 DIAGNOSIS — Z79.4 CONTROLLED TYPE 2 DIABETES MELLITUS WITH DIABETIC NEUROPATHY, WITH LONG-TERM CURRENT USE OF INSULIN (HCC): ICD-10-CM

## 2021-09-27 PROCEDURE — 90662 IIV NO PRSV INCREASED AG IM: CPT | Performed by: INTERNAL MEDICINE

## 2021-09-27 PROCEDURE — G0008 ADMIN INFLUENZA VIRUS VAC: HCPCS | Performed by: INTERNAL MEDICINE

## 2021-09-27 PROCEDURE — 99214 OFFICE O/P EST MOD 30 MIN: CPT | Performed by: INTERNAL MEDICINE

## 2021-09-27 NOTE — ASSESSMENT & PLAN NOTE
Lab Results   Component Value Date    CHOLESTEROL 108 09/22/2021    TRIG 208 (H) 09/22/2021    HDL 37 (L) 09/22/2021    LDLCALC 29 09/22/2021    excellent LDL on rosuvastatin  Improved diabetic control will likely result in a drop of his triglycerides

## 2021-09-27 NOTE — PROGRESS NOTES
Assessment/Plan:    Problem List Items Addressed This Visit        Endocrine    Pituitary macroadenoma [facility])    Relevant Orders    MRI brain pituitary wo and w contrast    Controlled type 2 diabetes mellitus with diabetic neuropathy, with long-term current use of insulin ([facility])    Relevant Medications    canagliflozin (Invokana) 100 mg    Type 2 diabetes mellitus, controlled, with renal complications ([facility] [address] )       Lab Results   Component Value Date    HGBA1C 7 0 (H) 09/22/2021   Given A1c is still above goal and given history of kidney disease, we are going to add Invokana 100 mg daily to the current diabetic regimen  Relevant Medications    canagliflozin (Invokana) 100 mg    Other Relevant Orders    Hemoglobin A1C    Lipid panel    Basic metabolic panel       Cardiovascular and Mediastinum    Coronary artery disease involving coronary bypass graft of native heart without angina pectoris       Genitourinary    Stage 3a chronic kidney disease [facility])     Lab Results   Component Value Date    EGFR 43 09/22/2021    EGFR 44 06/04/2021    EGFR 52 03/04/2021    CREATININE 1 60 (H) 09/22/2021    CREATININE 1 58 (H) 06/04/2021    CREATININE 1 38 (H) 03/04/2021   Continue to aggressively manage blood pressure and diabetes  Continue to follow renal function  Other    Hyperlipemia, mixed     Lab Results   Component Value Date    CHOLESTEROL 108 09/22/2021    TRIG 208 (H) 09/22/2021    HDL 37 (L) 09/22/2021    LDLCALC 29 09/22/2021    excellent LDL on rosuvastatin  Improved diabetic control will likely result in a drop of his triglycerides  Class 2 severe obesity due to excess calories with serious comorbidity and body mass index (BMI) of 37 0 to 37 9 in adult [facility])     Encouraged reducing carbohydrate intake  Other Visit Diagnoses     Needs flu shot    -  Primary    Relevant Orders    influenza vaccine, high-dose, PF 0 7 mL (FLUZONE HIGH-DOSE) (Completed)        BMI Counseling:  There is no height or weight on file to calculate BMI  The BMI is above normal  Nutrition recommendations include moderation in carbohydrate intake  Rationale for BMI follow-up plan is due to patient being overweight or obese  Chief Complaint     Follow-up; Flu Vaccine; Eye Problem          Patient ID: Masha Vergara is a 73 y o  male who returns for routine follow up  He saw the ophthalmologist in August and they didn't find anything wrong initially  He then had peripheral vision test which was abnormal  His blood sugars in the AM run 135-140, lunch < 105, supper 105, after supper they can be 145-170  He did increase the Victoza to 1 8 mg daily and continues to take metformin 1000 mg twice a day and Toujeo 26 units every evening  He has not had any chest pain  Objective:    /66 (BP Location: Right arm, Patient Position: Sitting, Cuff Size: Large)   Pulse 75   Temp 97 5 °F (36 4 °C)   Ht 5' 9" (1 753 m)   Wt 117 kg (257 lb)   SpO2 97%   BMI 37 95 kg/m²     Wt Readings from Last 3 Encounters:   09/27/21 117 kg (257 lb)   06/28/21 116 kg (255 lb 12 8 oz)   03/08/21 114 kg (251 lb)         Physical Exam    General:  Well-developed, well-nourished, in no acute distress  Cardiac:  Regular rate and rhythm, no murmur, gallop, or rub  There is no JVD or HJR  Lungs:  Clear to auscultation and percussion  Abdomen:  Soft, nontender, normoactive bowel sounds, no palpable masses, no hepatosplenomegaly  Extremities:  No clubbing, cyanosis, or edema

## 2021-09-27 NOTE — PATIENT INSTRUCTIONS
Type 2 diabetes mellitus, controlled, with renal complications (HCC)    Lab Results   Component Value Date    HGBA1C 7 0 (H) 09/22/2021   Given A1c is still above goal and given history of kidney disease, we are going to add Invokana 100 mg daily to the current diabetic regimen

## 2021-09-27 NOTE — ASSESSMENT & PLAN NOTE
Lab Results   Component Value Date    EGFR 43 09/22/2021    EGFR 44 06/04/2021    EGFR 52 03/04/2021    CREATININE 1 60 (H) 09/22/2021    CREATININE 1 58 (H) 06/04/2021    CREATININE 1 38 (H) 03/04/2021   Continue to aggressively manage blood pressure and diabetes  Continue to follow renal function

## 2021-09-27 NOTE — ASSESSMENT & PLAN NOTE
Lab Results   Component Value Date    HGBA1C 7 0 (H) 09/22/2021   Given A1c is still above goal and given history of kidney disease, we are going to add Invokana 100 mg daily to the current diabetic regimen

## 2021-10-05 ENCOUNTER — HOSPITAL ENCOUNTER (OUTPATIENT)
Dept: MRI IMAGING | Facility: HOSPITAL | Age: 71
Discharge: HOME | End: 2021-10-05
Attending: INTERNAL MEDICINE
Payer: MEDICARE

## 2021-10-05 DIAGNOSIS — D35.2 PITUITARY MACROADENOMA (HCC): ICD-10-CM

## 2021-10-05 PROCEDURE — G1004 CDSM NDSC: HCPCS

## 2021-10-05 PROCEDURE — 70553 MRI BRAIN STEM W/O & W/DYE: CPT

## 2021-10-05 PROCEDURE — A9585 GADOBUTROL INJECTION: HCPCS | Performed by: INTERNAL MEDICINE

## 2021-10-05 RX ADMIN — GADOBUTROL 10 ML: 604.72 INJECTION INTRAVENOUS at 16:30

## 2021-10-07 ENCOUNTER — TELEPHONE (OUTPATIENT)
Dept: FAMILY MEDICINE CLINIC | Facility: CLINIC | Age: 71
End: 2021-10-07

## 2021-10-08 ENCOUNTER — TELEPHONE (OUTPATIENT)
Dept: FAMILY MEDICINE CLINIC | Facility: CLINIC | Age: 71
End: 2021-10-08

## 2021-10-08 DIAGNOSIS — D32.9 MENINGIOMA (HCC): Primary | ICD-10-CM

## 2021-10-12 DIAGNOSIS — Z79.4 CONTROLLED TYPE 2 DIABETES MELLITUS WITH DIABETIC NEUROPATHY, WITH LONG-TERM CURRENT USE OF INSULIN (HCC): ICD-10-CM

## 2021-10-12 DIAGNOSIS — E11.40 CONTROLLED TYPE 2 DIABETES MELLITUS WITH DIABETIC NEUROPATHY, WITH LONG-TERM CURRENT USE OF INSULIN (HCC): ICD-10-CM

## 2021-10-12 RX ORDER — GABAPENTIN 300 MG/1
CAPSULE ORAL
Qty: 90 CAPSULE | Refills: 5 | Status: SHIPPED | OUTPATIENT
Start: 2021-10-12 | End: 2022-04-13 | Stop reason: SDUPTHER

## 2021-10-21 DIAGNOSIS — Z79.4 CONTROLLED TYPE 2 DIABETES MELLITUS WITH DIABETIC NEUROPATHY, WITH LONG-TERM CURRENT USE OF INSULIN (HCC): ICD-10-CM

## 2021-10-21 DIAGNOSIS — M1A.3790 CHRONIC GOUT DUE TO RENAL IMPAIRMENT INVOLVING TOE WITHOUT TOPHUS, UNSPECIFIED LATERALITY: ICD-10-CM

## 2021-10-21 DIAGNOSIS — E11.40 CONTROLLED TYPE 2 DIABETES MELLITUS WITH DIABETIC NEUROPATHY, WITH LONG-TERM CURRENT USE OF INSULIN (HCC): ICD-10-CM

## 2021-10-21 RX ORDER — ALLOPURINOL 300 MG/1
TABLET ORAL
Qty: 90 TABLET | Refills: 3 | Status: SHIPPED | OUTPATIENT
Start: 2021-10-21

## 2021-11-22 DIAGNOSIS — Z79.4 CONTROLLED TYPE 2 DIABETES MELLITUS WITH DIABETIC NEUROPATHY, WITH LONG-TERM CURRENT USE OF INSULIN (HCC): ICD-10-CM

## 2021-11-22 DIAGNOSIS — E11.40 CONTROLLED TYPE 2 DIABETES MELLITUS WITH DIABETIC NEUROPATHY, WITH LONG-TERM CURRENT USE OF INSULIN (HCC): ICD-10-CM

## 2021-12-01 DIAGNOSIS — E78.2 HYPERLIPEMIA, MIXED: ICD-10-CM

## 2021-12-02 DIAGNOSIS — I25.810 CORONARY ARTERY DISEASE INVOLVING CORONARY BYPASS GRAFT OF NATIVE HEART WITHOUT ANGINA PECTORIS: ICD-10-CM

## 2021-12-02 RX ORDER — ROSUVASTATIN CALCIUM 20 MG/1
TABLET, COATED ORAL
Qty: 90 TABLET | Refills: 1 | Status: SHIPPED | OUTPATIENT
Start: 2021-12-02 | End: 2022-06-03

## 2022-01-02 DIAGNOSIS — I10 ESSENTIAL HYPERTENSION: ICD-10-CM

## 2022-01-04 RX ORDER — HYDROCHLOROTHIAZIDE 25 MG/1
TABLET ORAL
Qty: 90 TABLET | Refills: 1 | Status: SHIPPED | OUTPATIENT
Start: 2022-01-04 | End: 2022-07-06

## 2022-01-09 DIAGNOSIS — E11.40 CONTROLLED TYPE 2 DIABETES MELLITUS WITH DIABETIC NEUROPATHY, WITH LONG-TERM CURRENT USE OF INSULIN (HCC): ICD-10-CM

## 2022-01-09 DIAGNOSIS — Z79.4 CONTROLLED TYPE 2 DIABETES MELLITUS WITH DIABETIC NEUROPATHY, WITH LONG-TERM CURRENT USE OF INSULIN (HCC): ICD-10-CM

## 2022-01-10 RX ORDER — LIRAGLUTIDE 6 MG/ML
INJECTION SUBCUTANEOUS
Qty: 9 ML | Refills: 5 | Status: SHIPPED | OUTPATIENT
Start: 2022-01-10 | End: 2022-01-31 | Stop reason: SDUPTHER

## 2022-01-27 ENCOUNTER — APPOINTMENT (OUTPATIENT)
Dept: LAB | Facility: CLINIC | Age: 72
End: 2022-01-27
Payer: MEDICARE

## 2022-01-27 LAB
ANION GAP SERPL CALCULATED.3IONS-SCNC: 5 MMOL/L (ref 4–13)
BUN SERPL-MCNC: 23 MG/DL (ref 5–25)
CALCIUM SERPL-MCNC: 9.8 MG/DL (ref 8.3–10.1)
CHLORIDE SERPL-SCNC: 106 MMOL/L (ref 100–108)
CHOLEST SERPL-MCNC: 109 MG/DL
CO2 SERPL-SCNC: 29 MMOL/L (ref 21–32)
CREAT SERPL-MCNC: 1.57 MG/DL (ref 0.6–1.3)
GFR SERPL CREATININE-BSD FRML MDRD: 44 ML/MIN/1.73SQ M
GLUCOSE P FAST SERPL-MCNC: 157 MG/DL (ref 65–99)
HDLC SERPL-MCNC: 38 MG/DL
LDLC SERPL CALC-MCNC: 21 MG/DL (ref 0–100)
NONHDLC SERPL-MCNC: 71 MG/DL
POTASSIUM SERPL-SCNC: 4.2 MMOL/L (ref 3.5–5.3)
SODIUM SERPL-SCNC: 140 MMOL/L (ref 136–145)
TRIGL SERPL-MCNC: 248 MG/DL

## 2022-01-27 PROCEDURE — 36415 COLL VENOUS BLD VENIPUNCTURE: CPT | Performed by: INTERNAL MEDICINE

## 2022-01-27 PROCEDURE — 83036 HEMOGLOBIN GLYCOSYLATED A1C: CPT | Performed by: INTERNAL MEDICINE

## 2022-01-27 PROCEDURE — 80048 BASIC METABOLIC PNL TOTAL CA: CPT | Performed by: INTERNAL MEDICINE

## 2022-01-27 PROCEDURE — 80061 LIPID PANEL: CPT | Performed by: INTERNAL MEDICINE

## 2022-01-28 DIAGNOSIS — I10 ESSENTIAL HYPERTENSION: ICD-10-CM

## 2022-01-28 LAB
EST. AVERAGE GLUCOSE BLD GHB EST-MCNC: 169 MG/DL
HBA1C MFR BLD: 7.5 %

## 2022-01-28 RX ORDER — AMLODIPINE BESYLATE AND BENAZEPRIL HYDROCHLORIDE 5; 20 MG/1; MG/1
CAPSULE ORAL
Qty: 90 CAPSULE | Refills: 3 | Status: SHIPPED | OUTPATIENT
Start: 2022-01-28

## 2022-01-31 ENCOUNTER — OFFICE VISIT (OUTPATIENT)
Dept: FAMILY MEDICINE CLINIC | Facility: CLINIC | Age: 72
End: 2022-01-31
Payer: MEDICARE

## 2022-01-31 VITALS
BODY MASS INDEX: 37.33 KG/M2 | DIASTOLIC BLOOD PRESSURE: 74 MMHG | WEIGHT: 252 LBS | TEMPERATURE: 97.5 F | OXYGEN SATURATION: 99 % | HEIGHT: 69 IN | SYSTOLIC BLOOD PRESSURE: 126 MMHG | HEART RATE: 98 BPM

## 2022-01-31 DIAGNOSIS — D32.9 MENINGIOMA (HCC): ICD-10-CM

## 2022-01-31 DIAGNOSIS — M1A.3790 CHRONIC GOUT DUE TO RENAL IMPAIRMENT INVOLVING TOE WITHOUT TOPHUS, UNSPECIFIED LATERALITY: Primary | ICD-10-CM

## 2022-01-31 DIAGNOSIS — I25.810 CORONARY ARTERY DISEASE INVOLVING CORONARY BYPASS GRAFT OF NATIVE HEART WITHOUT ANGINA PECTORIS: ICD-10-CM

## 2022-01-31 DIAGNOSIS — N18.30 CONTROLLED TYPE 2 DIABETES MELLITUS WITH STAGE 3 CHRONIC KIDNEY DISEASE, WITH LONG-TERM CURRENT USE OF INSULIN (HCC): ICD-10-CM

## 2022-01-31 DIAGNOSIS — Z79.4 CONTROLLED TYPE 2 DIABETES MELLITUS WITH STAGE 3 CHRONIC KIDNEY DISEASE, WITH LONG-TERM CURRENT USE OF INSULIN (HCC): ICD-10-CM

## 2022-01-31 DIAGNOSIS — E11.40 CONTROLLED TYPE 2 DIABETES MELLITUS WITH DIABETIC NEUROPATHY, WITH LONG-TERM CURRENT USE OF INSULIN (HCC): ICD-10-CM

## 2022-01-31 DIAGNOSIS — N18.31 STAGE 3A CHRONIC KIDNEY DISEASE (HCC): ICD-10-CM

## 2022-01-31 DIAGNOSIS — Z79.4 CONTROLLED TYPE 2 DIABETES MELLITUS WITH DIABETIC NEUROPATHY, WITH LONG-TERM CURRENT USE OF INSULIN (HCC): ICD-10-CM

## 2022-01-31 DIAGNOSIS — E11.22 CONTROLLED TYPE 2 DIABETES MELLITUS WITH STAGE 3 CHRONIC KIDNEY DISEASE, WITH LONG-TERM CURRENT USE OF INSULIN (HCC): ICD-10-CM

## 2022-01-31 DIAGNOSIS — E66.01 CLASS 2 SEVERE OBESITY DUE TO EXCESS CALORIES WITH SERIOUS COMORBIDITY AND BODY MASS INDEX (BMI) OF 37.0 TO 37.9 IN ADULT (HCC): ICD-10-CM

## 2022-01-31 PROBLEM — D35.2 PITUITARY MACROADENOMA (HCC): Status: RESOLVED | Noted: 2019-09-18 | Resolved: 2022-01-31

## 2022-01-31 PROCEDURE — 99214 OFFICE O/P EST MOD 30 MIN: CPT | Performed by: INTERNAL MEDICINE

## 2022-01-31 RX ORDER — LIRAGLUTIDE 6 MG/ML
1.8 INJECTION SUBCUTANEOUS DAILY
Qty: 9 ML | Refills: 5
Start: 2022-01-31 | End: 2022-03-14 | Stop reason: SDUPTHER

## 2022-01-31 NOTE — ASSESSMENT & PLAN NOTE
Lab Results   Component Value Date    HGBA1C 7 5 (H) 01/27/2022   His A1c has gone up a little bit but he certainly has not been as attentive to his diet  He is going to work with his wife was a dietitian to come up with a strategy for what he can eat on the way home that would not be fast food  Continue current diabetic regimen

## 2022-01-31 NOTE — ASSESSMENT & PLAN NOTE
Lab Results   Component Value Date    HGBA1C 7 5 (H) 01/27/2022   Renal function is stable  Continue ACE-inhibitor and work on getting A1c under seven

## 2022-01-31 NOTE — PROGRESS NOTES
Assessment/Plan:    Problem List Items Addressed This Visit        Endocrine    Controlled type 2 diabetes mellitus with diabetic neuropathy, with long-term current use of insulin ([address] )       Lab Results   Component Value Date    HGBA1C 7 5 (H) 01/27/2022   His A1c has gone up a little bit but he certainly has not been as attentive to his diet  He is going to work with his wife was a dietitian to come up with a strategy for what he can eat on the way home that would not be fast food  Continue current diabetic regimen  Relevant Medications    liraglutide (Victoza) injection    Type 2 diabetes mellitus, controlled, with renal complications ([facility])       Lab Results   Component Value Date    HGBA1C 7 5 (H) 01/27/2022   Renal function is stable  Continue ACE-inhibitor and work on getting A1c under seven  Relevant Medications    liraglutide (Victoza) injection       Cardiovascular and Mediastinum    Coronary artery disease involving coronary bypass graft of native heart without angina pectoris     No chest pain  Continue current regimen  Nervous and Auditory    Meningioma right parasellar [facility])     His most recent MRI in October suggested that what was previously thought to be a pituitary lesion is probably a parasellar meningioma  He did see a neurosurgeon at [facility] and is going to be following up with Neurosurgery in March  He will schedule another MRI  Musculoskeletal and Integument    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 - Primary     He has not had a episodes of gout since he went on allopurinol  Continue current regimen  Genitourinary    Stage 3a chronic kidney disease (HCC)       Other    Class 2 severe obesity due to excess calories with serious comorbidity and body mass index (BMI) of 37 0 to 37 9 in adult ([facility])        BMI Counseling: Body mass index is 37 21 kg/m²   The BMI is above normal  Nutrition recommendations include decreasing fast food intake  Rationale for BMI follow-up plan is due to patient being overweight or obese  He has had his COVID booster  Chief Complaint     Follow-up          Patient ID: Valentino Tolliver is a 73 y o  male who returns for routine follow up  Blood sugars are 130s in the mornings, prior to lunch 110s, 130s before supper  He has a tendency to eat fast food during the week on his drive home (he works in [employer] and lives in [place])  He did not go on the Invokana because of cost and apparently he did have a reaction to an SGLT2 inhibitor in the past (sounds like a had some balanitis)  He has not had any exertional chest pain or shortness of breath  He did go to see the neurosurgeon at [facility] back in October for what appears to be a parasellar meningioma  The plan at this point is to have another MRI in March of this year  He has not had any gout attacks for years  Objective:    /74 (BP Location: Right arm, Patient Position: Sitting, Cuff Size: Standard)   Pulse 98   Temp 97 5 °F (36 4 °C)   Ht 5' 9" (1 753 m)   Wt 114 kg (252 lb)   SpO2 99%   BMI 37 21 kg/m²     Wt Readings from Last 3 Encounters:   01/31/22 114 kg (252 lb)   09/27/21 117 kg (257 lb)   06/28/21 116 kg (255 lb 12 8 oz)         Physical Exam    General:  Well-developed, well-nourished, in no acute distress  Cardiac:  Regular rate and rhythm, no murmur, gallop, or rub  There is no JVD or HJR  Lungs:  Clear to auscultation and percussion  Abdomen:  Soft, nontender, normoactive bowel sounds, no palpable masses, no hepatosplenomegaly  Extremities:  No clubbing, cyanosis, or edema  Diabetic Foot Exam    Patient's shoes and socks removed  Right Foot/Ankle   Right Foot Inspection  Skin Exam: skin normal and skin intact  No dry skin, no warmth, no callus, no erythema, no maceration, no abnormal color, no pre-ulcer, no ulcer and no callus       Sensory   Monofilament testing: intact    Vascular  The right DP pulse is 2+  The right PT pulse is 2+  Left Foot/Ankle  Left Foot Inspection  Skin Exam: skin normal and skin intact  No dry skin, no warmth, no erythema, no maceration, normal color, no pre-ulcer, no ulcer and no callus  Sensory   Monofilament testing: intact    Vascular  The left DP pulse is 2+  The left PT pulse is 2+       Assign Risk Category  No deformity present  No loss of protective sensation  No weak pulses  Risk: 0

## 2022-01-31 NOTE — ASSESSMENT & PLAN NOTE
His most recent MRI in October suggested that what was previously thought to be a pituitary lesion is probably a parasellar meningioma  He did see a neurosurgeon at [facility] and is going to be following up with Neurosurgery in March  He will schedule another MRI

## 2022-03-12 ENCOUNTER — PATIENT MESSAGE (OUTPATIENT)
Dept: FAMILY MEDICINE CLINIC | Facility: CLINIC | Age: 72
End: 2022-03-12

## 2022-03-14 DIAGNOSIS — Z79.4 CONTROLLED TYPE 2 DIABETES MELLITUS WITH DIABETIC NEUROPATHY, WITH LONG-TERM CURRENT USE OF INSULIN (HCC): ICD-10-CM

## 2022-03-14 DIAGNOSIS — E11.40 CONTROLLED TYPE 2 DIABETES MELLITUS WITH DIABETIC NEUROPATHY, WITH LONG-TERM CURRENT USE OF INSULIN (HCC): ICD-10-CM

## 2022-03-14 RX ORDER — LIRAGLUTIDE 6 MG/ML
1.8 INJECTION SUBCUTANEOUS DAILY
Qty: 9 ML | Refills: 5 | Status: SHIPPED | OUTPATIENT
Start: 2022-03-14

## 2022-03-14 NOTE — TELEPHONE ENCOUNTER
Luis Parks MA 3/12/2022 3:13 PM EST      ----- Message -----  From: Que Pfeiffer  Sent: 3/12/2022 2:58 PM EST  To: [facility]  Subject: Lord Duty     my pharmacy [facility]) informed me that my doctor canceled my refill on this medicine  If so can you explain why  If not can you re-authorize this prescription?   thanks

## 2022-04-13 DIAGNOSIS — E11.40 CONTROLLED TYPE 2 DIABETES MELLITUS WITH DIABETIC NEUROPATHY, WITH LONG-TERM CURRENT USE OF INSULIN (HCC): ICD-10-CM

## 2022-04-13 DIAGNOSIS — Z79.4 CONTROLLED TYPE 2 DIABETES MELLITUS WITH DIABETIC NEUROPATHY, WITH LONG-TERM CURRENT USE OF INSULIN (HCC): ICD-10-CM

## 2022-04-14 RX ORDER — GABAPENTIN 300 MG/1
CAPSULE ORAL
Qty: 90 CAPSULE | Refills: 3 | Status: SHIPPED | OUTPATIENT
Start: 2022-04-14 | End: 2022-08-08

## 2022-06-03 DIAGNOSIS — I25.810 CORONARY ARTERY DISEASE INVOLVING CORONARY BYPASS GRAFT OF NATIVE HEART WITHOUT ANGINA PECTORIS: ICD-10-CM

## 2022-06-03 DIAGNOSIS — E78.2 HYPERLIPEMIA, MIXED: ICD-10-CM

## 2022-06-03 RX ORDER — ROSUVASTATIN CALCIUM 20 MG/1
TABLET, COATED ORAL
Qty: 90 TABLET | Refills: 1 | Status: SHIPPED | OUTPATIENT
Start: 2022-06-03

## 2022-06-07 ENCOUNTER — RA CDI HCC (OUTPATIENT)
Dept: OTHER | Facility: HOSPITAL | Age: 72
End: 2022-06-07

## 2022-06-07 NOTE — PROGRESS NOTES
[address]  coding opportunities       Chart reviewed, no opportunity found: CHART REVIEWED, NO OPPORTUNITY FOUND        Patients Insurance     Medicare Insurance: Medicare

## 2022-06-09 ENCOUNTER — APPOINTMENT (OUTPATIENT)
Dept: LAB | Facility: CLINIC | Age: 72
End: 2022-06-09
Payer: MEDICARE

## 2022-06-13 ENCOUNTER — OFFICE VISIT (OUTPATIENT)
Dept: FAMILY MEDICINE CLINIC | Facility: CLINIC | Age: 72
End: 2022-06-13
Payer: MEDICARE

## 2022-06-13 VITALS
TEMPERATURE: 97.8 F | HEIGHT: 69 IN | SYSTOLIC BLOOD PRESSURE: 130 MMHG | BODY MASS INDEX: 37.65 KG/M2 | HEART RATE: 77 BPM | DIASTOLIC BLOOD PRESSURE: 86 MMHG | WEIGHT: 254.2 LBS | OXYGEN SATURATION: 98 %

## 2022-06-13 DIAGNOSIS — E66.01 CLASS 2 SEVERE OBESITY DUE TO EXCESS CALORIES WITH SERIOUS COMORBIDITY AND BODY MASS INDEX (BMI) OF 37.0 TO 37.9 IN ADULT (HCC): ICD-10-CM

## 2022-06-13 DIAGNOSIS — D32.9 MENINGIOMA (HCC): ICD-10-CM

## 2022-06-13 DIAGNOSIS — Z79.4 CONTROLLED TYPE 2 DIABETES MELLITUS WITH DIABETIC NEUROPATHY, WITH LONG-TERM CURRENT USE OF INSULIN (HCC): ICD-10-CM

## 2022-06-13 DIAGNOSIS — E78.2 HYPERLIPEMIA, MIXED: ICD-10-CM

## 2022-06-13 DIAGNOSIS — M1A.3790 CHRONIC GOUT DUE TO RENAL IMPAIRMENT INVOLVING TOE WITHOUT TOPHUS, UNSPECIFIED LATERALITY: Primary | ICD-10-CM

## 2022-06-13 DIAGNOSIS — E11.40 CONTROLLED TYPE 2 DIABETES MELLITUS WITH DIABETIC NEUROPATHY, WITH LONG-TERM CURRENT USE OF INSULIN (HCC): ICD-10-CM

## 2022-06-13 DIAGNOSIS — I25.810 CORONARY ARTERY DISEASE INVOLVING CORONARY BYPASS GRAFT OF NATIVE HEART WITHOUT ANGINA PECTORIS: ICD-10-CM

## 2022-06-13 DIAGNOSIS — N18.31 STAGE 3A CHRONIC KIDNEY DISEASE (HCC): ICD-10-CM

## 2022-06-13 PROCEDURE — G0439 PPPS, SUBSEQ VISIT: HCPCS | Performed by: INTERNAL MEDICINE

## 2022-06-13 PROCEDURE — 99214 OFFICE O/P EST MOD 30 MIN: CPT | Performed by: INTERNAL MEDICINE

## 2022-06-13 PROCEDURE — 1123F ACP DISCUSS/DSCN MKR DOCD: CPT | Performed by: INTERNAL MEDICINE

## 2022-06-13 NOTE — PROGRESS NOTES
Pieter Thakur is here for his Subsequent Wellness visit  Last Medicare Wellness visit information reviewed, patient interviewed and updates made to the record today  Health Risk Assessment:   Patient rates overall health as good  Patient feels that their physical health rating is same  Patient is satisfied with their life  Eyesight was rated as slightly worse  Hearing was rated as same  Patient feels that their emotional and mental health rating is same  Patients states they are never, rarely angry  Patient states they are often unusually tired/fatigued  Pain experienced in the last 7 days has been some  Patient's pain rating has been 3/10  Patient states that he has experienced no weight loss or gain in last 6 months  Has occasional bilateral hip pain  He takes an occasional Aleve which is helpful  Previous x-rays showed arthritis  Depression Screening:   PHQ-2 Score: 2      Fall Risk Screening: In the past year, patient has experienced: no history of falling in past year      Home Safety:  Patient does not have trouble with stairs inside or outside of their home  Patient has working smoke alarms and has working carbon monoxide detector  Home safety hazards include: not having non-slip bath and/or shower mats  Nutrition:   Current diet is Regular and Limited junk food  Medications:   Patient is not currently taking any over-the-counter supplements  Patient is able to manage medications  Activities of Daily Living (ADLs)/Instrumental Activities of Daily Living (IADLs):   Walk and transfer into and out of bed and chair?: Yes  Dress and groom yourself?: Yes    Bathe or shower yourself?: Yes    Feed yourself?  Yes  Do your laundry/housekeeping?: Yes  Manage your money, pay your bills and track your expenses?: Yes  Make your own meals?: Yes    Do your own shopping?: Yes    Durable Medical Equipment Suppliers  N/A    Previous Hospitalizations:   Any hospitalizations or ED visits within the last 12 months?: No      Advance Care Planning:   Living will: No    Durable POA for healthcare: No    Advanced directive: No    Advanced directive counseling given: Yes    Five wishes given: Yes    End of Life Decisions reviewed with patient: Yes    Provider agrees with end of life decisions: Yes      Comments: Davon Salomon chooses comfort care measures in the event of a terminal diagnosis  PREVENTIVE SCREENINGS      Cardiovascular Screening:    General: Screening Not Indicated and History Lipid Disorder      Diabetes Screening:     General: Screening Not Indicated and History Diabetes      Colorectal Cancer Screening:     General: Screening Current      Osteoporosis Screening:    General: Screening Not Indicated      Abdominal Aortic Aneurysm (AAA) Screening:    Risk factors include: age between 73-69 yo        Lung Cancer Screening:     General: Screening Not Indicated      Hepatitis C Screening:    General: Screening Current    Screening, Brief Intervention, and Referral to Treatment (SBIRT)    Screening  Typical number of drinks in a day: 0  Typical number of drinks in a week: 0  Interpretation: Low risk drinking behavior  AUDIT-C Screenin) How often did you have a drink containing alcohol in the past year? monthly or less  2) How many drinks did you have on a typical day when you were drinking in the past year?  1 to 2  3) How often did you have 6 or more drinks on one occasion in the past year? never    AUDIT-C Score: 1  Interpretation: Score 0-3 (male): Negative screen for alcohol misuse    Single Item Drug Screening:  How often have you used an illegal drug (including marijuana) or a prescription medication for non-medical reasons in the past year? never    Single Item Drug Screen Score: 0  Interpretation: Negative screen for possible drug use disorder

## 2022-06-13 NOTE — ASSESSMENT & PLAN NOTE
Lab Results   Component Value Date    EGFR 46 06/09/2022    EGFR 44 01/27/2022    EGFR 43 09/22/2021    CREATININE 1 51 (H) 06/09/2022    CREATININE 1 57 (H) 01/27/2022    CREATININE 1 60 (H) 09/22/2021   His creatinine is stable  Continue to monitor

## 2022-06-13 NOTE — ASSESSMENT & PLAN NOTE
Lab Results   Component Value Date    HGBA1C 7 8 (H) 06/09/2022   He is already on a pretty aggressive diabetic regimen  He did not tolerate Invokana in the past secondary to polyuria as well as some type of infection on the tip of his penis  Will continue current regimen

## 2022-06-13 NOTE — ASSESSMENT & PLAN NOTE
I was going to order his MRI but the computer suggested with and without contrast and there is a contrast shortage  He wants to hold off for now since this was just routine  We can order this in the near future once the contrast shortage resolves

## 2022-06-13 NOTE — ASSESSMENT & PLAN NOTE
Excellent LDL but triglycerides remain slightly elevated  Suspect that this is related to his weight and diabetes  Continue rosuvastatin

## 2022-06-13 NOTE — PROGRESS NOTES
Assessment/Plan:    Meningioma right parasellar ([address] )  I was going to order his MRI but the computer suggested with and without contrast and there is a contrast shortage  He wants to hold off for now since this was just routine  We can order this in the near future once the contrast shortage resolves  Hyperlipemia, mixed  Excellent LDL but triglycerides remain slightly elevated  Suspect that this is related to his weight and diabetes  Continue rosuvastatin  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  No recurrence of gout  Continue allopurinol  Class 2 severe obesity due to excess calories with serious comorbidity and body mass index (BMI) of 37 0 to 37 9 in adult [facility])  His weight loss has stalled  Limit carbohydrate intake  Controlled type 2 diabetes mellitus with diabetic neuropathy, with long-term current use of insulin ([facility])    Lab Results   Component Value Date    HGBA1C 7 8 (H) 06/09/2022   He is already on a pretty aggressive diabetic regimen  He did not tolerate Invokana in the past secondary to polyuria as well as some type of infection on the tip of his penis  Will continue current regimen  Coronary artery disease involving coronary bypass graft of native heart without angina pectoris  No angina  Continue risk factor modification  Stage 3a chronic kidney disease [facility])  Lab Results   Component Value Date    EGFR 46 06/09/2022    EGFR 44 01/27/2022    EGFR 43 09/22/2021    CREATININE 1 51 (H) 06/09/2022    CREATININE 1 57 (H) 01/27/2022    CREATININE 1 60 (H) 09/22/2021   His creatinine is stable  Continue to monitor  Chief Complaint     Medicare Wellness Visit          Patient ID: Jorge Hood is a 81 y o  male who returns for routine follow up  His morning blood sugars are running 110-180  He has not had any chest pain or shortness of breath  He has not had any further gout attacks    He was supposed to have a follow-up MRI of his parasellar meningioma but this has not been arranged  He was seeing a neurosurgeon in [address] for follow-up of the meningioma  Objective:    /86 (BP Location: Right arm, Patient Position: Sitting)   Pulse 77   Temp 97 8 °F (36 6 °C)   Ht 5' 9" (1 753 m)   Wt 115 kg (254 lb 3 2 oz)   SpO2 98%   BMI 37 54 kg/m²     Wt Readings from Last 3 Encounters:   06/13/22 115 kg (254 lb 3 2 oz)   01/31/22 114 kg (252 lb)   09/27/21 117 kg (257 lb)         Physical Exam    General:  Well-developed, well-nourished, in no acute distress  Cardiac:  Regular rate and rhythm, no murmur, gallop, or rub  There is no JVD or HJR  Lungs:  Clear to auscultation and percussion  Abdomen:  Soft, nontender, normoactive bowel sounds, no palpable masses, no hepatosplenomegaly  Extremities:  No clubbing, cyanosis, or edema

## 2022-07-04 DIAGNOSIS — Z79.4 CONTROLLED TYPE 2 DIABETES MELLITUS WITH DIABETIC NEUROPATHY, WITH LONG-TERM CURRENT USE OF INSULIN (HCC): ICD-10-CM

## 2022-07-04 DIAGNOSIS — E11.40 CONTROLLED TYPE 2 DIABETES MELLITUS WITH DIABETIC NEUROPATHY, WITH LONG-TERM CURRENT USE OF INSULIN (HCC): ICD-10-CM

## 2022-07-05 RX ORDER — INSULIN GLARGINE 300 U/ML
INJECTION, SOLUTION SUBCUTANEOUS
Qty: 9 ML | Refills: 5 | Status: SHIPPED | OUTPATIENT
Start: 2022-07-05

## 2022-07-06 DIAGNOSIS — I10 ESSENTIAL HYPERTENSION: ICD-10-CM

## 2022-07-06 RX ORDER — HYDROCHLOROTHIAZIDE 25 MG/1
TABLET ORAL
Qty: 90 TABLET | Refills: 3 | Status: SHIPPED | OUTPATIENT
Start: 2022-07-06

## 2022-08-08 DIAGNOSIS — Z79.4 CONTROLLED TYPE 2 DIABETES MELLITUS WITH DIABETIC NEUROPATHY, WITH LONG-TERM CURRENT USE OF INSULIN (HCC): ICD-10-CM

## 2022-08-08 DIAGNOSIS — E11.40 CONTROLLED TYPE 2 DIABETES MELLITUS WITH DIABETIC NEUROPATHY, WITH LONG-TERM CURRENT USE OF INSULIN (HCC): ICD-10-CM

## 2022-08-08 RX ORDER — GABAPENTIN 300 MG/1
CAPSULE ORAL
Qty: 90 CAPSULE | Refills: 5 | Status: SHIPPED | OUTPATIENT
Start: 2022-08-08

## 2022-09-04 DIAGNOSIS — Z79.4 CONTROLLED TYPE 2 DIABETES MELLITUS WITH DIABETIC NEUROPATHY, WITH LONG-TERM CURRENT USE OF INSULIN (HCC): ICD-10-CM

## 2022-09-04 DIAGNOSIS — E11.40 CONTROLLED TYPE 2 DIABETES MELLITUS WITH DIABETIC NEUROPATHY, WITH LONG-TERM CURRENT USE OF INSULIN (HCC): ICD-10-CM

## 2022-09-06 RX ORDER — LIRAGLUTIDE 6 MG/ML
INJECTION SUBCUTANEOUS
Qty: 9 ML | Refills: 5 | Status: SHIPPED | OUTPATIENT
Start: 2022-09-06

## 2022-09-22 ENCOUNTER — HOSPITAL ENCOUNTER (OUTPATIENT)
Dept: MRI IMAGING | Facility: HOSPITAL | Age: 72
End: 2022-09-22
Attending: INTERNAL MEDICINE
Payer: MEDICARE

## 2022-09-22 DIAGNOSIS — D32.9 MENINGIOMA (HCC): ICD-10-CM

## 2022-09-22 PROCEDURE — G1004 CDSM NDSC: HCPCS

## 2022-09-22 PROCEDURE — A9585 GADOBUTROL INJECTION: HCPCS | Performed by: INTERNAL MEDICINE

## 2022-09-22 PROCEDURE — 70553 MRI BRAIN STEM W/O & W/DYE: CPT

## 2022-09-22 RX ADMIN — GADOBUTROL 10 ML: 604.72 INJECTION INTRAVENOUS at 13:53

## 2022-09-29 ENCOUNTER — APPOINTMENT (OUTPATIENT)
Dept: LAB | Facility: CLINIC | Age: 72
End: 2022-09-29
Payer: MEDICARE

## 2022-09-29 LAB
ANION GAP SERPL CALCULATED.3IONS-SCNC: 4 MMOL/L (ref 4–13)
BUN SERPL-MCNC: 24 MG/DL (ref 5–25)
CALCIUM SERPL-MCNC: 9.1 MG/DL (ref 8.3–10.1)
CHLORIDE SERPL-SCNC: 108 MMOL/L (ref 96–108)
CHOLEST SERPL-MCNC: 103 MG/DL
CO2 SERPL-SCNC: 27 MMOL/L (ref 21–32)
CREAT SERPL-MCNC: 1.62 MG/DL (ref 0.6–1.3)
GFR SERPL CREATININE-BSD FRML MDRD: 42 ML/MIN/1.73SQ M
GLUCOSE P FAST SERPL-MCNC: 115 MG/DL (ref 65–99)
HDLC SERPL-MCNC: 36 MG/DL
LDLC SERPL CALC-MCNC: 23 MG/DL (ref 0–100)
NONHDLC SERPL-MCNC: 67 MG/DL
POTASSIUM SERPL-SCNC: 4.6 MMOL/L (ref 3.5–5.3)
SODIUM SERPL-SCNC: 139 MMOL/L (ref 135–147)
TRIGL SERPL-MCNC: 221 MG/DL

## 2022-09-30 LAB
EST. AVERAGE GLUCOSE BLD GHB EST-MCNC: 143 MG/DL
HBA1C MFR BLD: 6.6 %

## 2022-10-03 ENCOUNTER — OFFICE VISIT (OUTPATIENT)
Dept: FAMILY MEDICINE CLINIC | Facility: CLINIC | Age: 72
End: 2022-10-03
Payer: MEDICARE

## 2022-10-03 VITALS
TEMPERATURE: 97.6 F | BODY MASS INDEX: 36.61 KG/M2 | HEART RATE: 67 BPM | HEIGHT: 69 IN | OXYGEN SATURATION: 97 % | WEIGHT: 247.2 LBS | SYSTOLIC BLOOD PRESSURE: 130 MMHG | DIASTOLIC BLOOD PRESSURE: 76 MMHG

## 2022-10-03 DIAGNOSIS — E66.01 CLASS 2 SEVERE OBESITY DUE TO EXCESS CALORIES WITH SERIOUS COMORBIDITY AND BODY MASS INDEX (BMI) OF 37.0 TO 37.9 IN ADULT (HCC): ICD-10-CM

## 2022-10-03 DIAGNOSIS — N18.30 CONTROLLED TYPE 2 DIABETES MELLITUS WITH STAGE 3 CHRONIC KIDNEY DISEASE, WITH LONG-TERM CURRENT USE OF INSULIN (HCC): ICD-10-CM

## 2022-10-03 DIAGNOSIS — D32.9 MENINGIOMA (HCC): ICD-10-CM

## 2022-10-03 DIAGNOSIS — Z23 NEEDS FLU SHOT: ICD-10-CM

## 2022-10-03 DIAGNOSIS — Z23 ENCOUNTER FOR IMMUNIZATION: Primary | ICD-10-CM

## 2022-10-03 DIAGNOSIS — Z79.4 CONTROLLED TYPE 2 DIABETES MELLITUS WITH STAGE 3 CHRONIC KIDNEY DISEASE, WITH LONG-TERM CURRENT USE OF INSULIN (HCC): ICD-10-CM

## 2022-10-03 DIAGNOSIS — I25.810 CORONARY ARTERY DISEASE INVOLVING CORONARY BYPASS GRAFT OF NATIVE HEART WITHOUT ANGINA PECTORIS: ICD-10-CM

## 2022-10-03 DIAGNOSIS — E11.22 CONTROLLED TYPE 2 DIABETES MELLITUS WITH STAGE 3 CHRONIC KIDNEY DISEASE, WITH LONG-TERM CURRENT USE OF INSULIN (HCC): ICD-10-CM

## 2022-10-03 DIAGNOSIS — N18.31 STAGE 3A CHRONIC KIDNEY DISEASE (HCC): ICD-10-CM

## 2022-10-03 DIAGNOSIS — M1A.3790 CHRONIC GOUT DUE TO RENAL IMPAIRMENT INVOLVING TOE WITHOUT TOPHUS, UNSPECIFIED LATERALITY: ICD-10-CM

## 2022-10-03 DIAGNOSIS — Z85.820 HISTORY OF MELANOMA: ICD-10-CM

## 2022-10-03 PROCEDURE — 99214 OFFICE O/P EST MOD 30 MIN: CPT | Performed by: INTERNAL MEDICINE

## 2022-10-03 PROCEDURE — G0008 ADMIN INFLUENZA VIRUS VAC: HCPCS | Performed by: INTERNAL MEDICINE

## 2022-10-03 PROCEDURE — 90662 IIV NO PRSV INCREASED AG IM: CPT | Performed by: INTERNAL MEDICINE

## 2022-10-03 NOTE — PROGRESS NOTES
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  No gout attacks  Continue allopurinol  Class 2 severe obesity due to excess calories with serious comorbidity and body mass index (BMI) of 37 0 to 37 9 in adult [facility])  Great success with limiting carbohydrates and walking more  Continue current regimen  Controlled type 2 diabetes mellitus with diabetic neuropathy, with long-term current use of insulin (HCC)    Lab Results   Component Value Date    HGBA1C 6 6 (H) 09/29/2022   Excellent glycemic control on metformin, insulin glargine, and Liraglutide  Coronary artery disease involving coronary bypass graft of native heart without angina pectoris  No angina  Continue current regimen and risk factor modification  Type 2 diabetes mellitus, controlled, with renal complications (HCC)    Lab Results   Component Value Date    HGBA1C 6 6 (H) 09/29/2022   His renal function has remained stable over the past three years  Advised him to continue his current antihypertensive and diabetic regimen  Avoid nonsteroidals  BMI Counseling: Body mass index is 36 51 kg/m²  The BMI is above normal  Nutrition recommendations include moderation in carbohydrate intake  Rationale for BMI follow-up plan is due to patient being overweight or obese  Chief Complaint     Follow-up; Care Gap Request          Patient ID: Yaritza Camacho is a 81 y o  male who returns for routine follow up  He has been cutting back on carbs and walking more  His weight is down 7 lbs  He doesn't have headaches, chest pain or pressure, no shortness of breath  He did have his follow-up MRI which does show some interval increase in the size of his parasellar meningioma  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 several years ago but has not seen a dermatologist since moving appear to [place]        Objective:    /76 (BP Location: Right arm, Patient Position: Sitting)   Pulse 67   Temp 97 6 °F (36 4 °C)   Ht 5' 9" (1 753 m)   Wt 112 kg (247 lb 3 2 oz)   SpO2 97%   BMI 36 51 kg/m²     Wt Readings from Last 3 Encounters:   10/03/22 112 kg (247 lb 3 2 oz)   06/13/22 115 kg (254 lb 3 2 oz)   01/31/22 114 kg (252 lb)         Physical Exam    General:  Well-developed, well-nourished, in no acute distress  Cardiac:  Regular rate and rhythm, no murmur, gallop, or rub  There is no JVD or HJR  Lungs:  Clear to auscultation and percussion  Abdomen:  Soft, nontender, normoactive bowel sounds, no palpable masses, no hepatosplenomegaly  Extremities:  No clubbing, cyanosis, or edema  Skin:  No significant lesions on his back or trunk

## 2022-10-03 NOTE — ASSESSMENT & PLAN NOTE
Lab Results   Component Value Date    HGBA1C 6 6 (H) 09/29/2022   His renal function has remained stable over the past three years  Advised him to continue his current antihypertensive and diabetic regimen  Avoid nonsteroidals

## 2022-10-03 NOTE — ASSESSMENT & PLAN NOTE
Lab Results   Component Value Date    HGBA1C 6 6 (H) 09/29/2022   Excellent glycemic control on metformin, insulin glargine, and Liraglutide

## 2022-11-03 LAB
LEFT EYE DIABETIC RETINOPATHY: NORMAL
RIGHT EYE DIABETIC RETINOPATHY: NORMAL

## 2022-11-11 DIAGNOSIS — Z79.4 CONTROLLED TYPE 2 DIABETES MELLITUS WITH DIABETIC NEUROPATHY, WITH LONG-TERM CURRENT USE OF INSULIN (HCC): ICD-10-CM

## 2022-11-11 DIAGNOSIS — E11.40 CONTROLLED TYPE 2 DIABETES MELLITUS WITH DIABETIC NEUROPATHY, WITH LONG-TERM CURRENT USE OF INSULIN (HCC): ICD-10-CM

## 2022-11-15 ENCOUNTER — OFFICE VISIT (OUTPATIENT)
Dept: FAMILY MEDICINE CLINIC | Facility: CLINIC | Age: 72
End: 2022-11-15

## 2022-11-15 VITALS
DIASTOLIC BLOOD PRESSURE: 74 MMHG | HEART RATE: 84 BPM | OXYGEN SATURATION: 97 % | SYSTOLIC BLOOD PRESSURE: 118 MMHG | HEIGHT: 69 IN | BODY MASS INDEX: 35.78 KG/M2 | WEIGHT: 241.6 LBS | TEMPERATURE: 97.1 F

## 2022-11-15 DIAGNOSIS — U07.1 2019 NOVEL CORONAVIRUS DISEASE (COVID-19): Primary | ICD-10-CM

## 2022-11-15 LAB
SARS-COV-2 AG UPPER RESP QL IA: POSITIVE
VALID CONTROL: ABNORMAL

## 2022-11-15 RX ORDER — NIRMATRELVIR AND RITONAVIR 150-100 MG
2 KIT ORAL 2 TIMES DAILY
Qty: 20 TABLET | Refills: 0 | Status: SHIPPED | OUTPATIENT
Start: 2022-11-15 | End: 2022-11-20

## 2022-11-15 NOTE — ASSESSMENT & PLAN NOTE
His point of care COVID test today was positive  He should isolate for two more days and then may go back to work wearing a mask  He has several risk factors for progression and were going to treat with Paxlovid for the next five days  We did review the EUA status of this medication  Advised to hold rosuvastatin for the next five days and watch for low blood pressure  Should avoid getting up rapidly from a chair  Continue to monitor Pulsox at home and call if there is any evidence of desaturation

## 2022-11-15 NOTE — PROGRESS NOTES
Assessment/Plan:    2019 novel coronavirus disease (COVID-19)  His point of care COVID test today was positive  He should isolate for two more days and then may go back to work wearing a mask  He has several risk factors for progression and were going to treat with Paxlovid for the next five days  We did review the EUA status of this medication  Advised to hold rosuvastatin for the next five days and watch for low blood pressure  Should avoid getting up rapidly from a chair  Continue to monitor Pulsox at home and call if there is any evidence of desaturation  Chief Complaint     Sore Throat; Cough; Nasal Congestion; COVID-19          Patient ID: Rogelio Velasquez is a 81 y o  male who returns for evaluation of URI symptoms  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  Three days ago he developed a sore throat, nasal congestion, and a cough  He did a COVID test 2 nights ago which was negative  No other COVID symptoms  Objective:    /74 (BP Location: Right arm, Patient Position: Sitting, Cuff Size: Large)   Pulse 84   Temp (!) 97 1 °F (36 2 °C)   Ht 5' 9" (1 753 m)   Wt 110 kg (241 lb 9 6 oz)   SpO2 97%   BMI 35 68 kg/m²     Wt Readings from Last 3 Encounters:   11/15/22 110 kg (241 lb 9 6 oz)   10/03/22 112 kg (247 lb 3 2 oz)   06/13/22 115 kg (254 lb 3 2 oz)         Physical Exam  Constitutional:       General: He is not in acute distress  Appearance: He is well-developed  Comments: He sounds congested but spoke in full sentences  HENT:      Right Ear: Tympanic membrane normal       Left Ear: Tympanic membrane normal       Nose: Congestion present  Mouth/Throat:      Mouth: No oral lesions  Pharynx: Posterior oropharyngeal erythema present  No oropharyngeal exudate  Pulmonary:      Effort: Pulmonary effort is normal       Breath sounds: Normal breath sounds  Neurological:      Mental Status: He is alert

## 2022-12-01 DIAGNOSIS — E78.2 HYPERLIPEMIA, MIXED: ICD-10-CM

## 2022-12-01 DIAGNOSIS — I25.810 CORONARY ARTERY DISEASE INVOLVING CORONARY BYPASS GRAFT OF NATIVE HEART WITHOUT ANGINA PECTORIS: ICD-10-CM

## 2022-12-01 RX ORDER — ROSUVASTATIN CALCIUM 20 MG/1
TABLET, COATED ORAL
Qty: 90 TABLET | Refills: 1 | Status: SHIPPED | OUTPATIENT
Start: 2022-12-01

## 2022-12-29 ENCOUNTER — APPOINTMENT (OUTPATIENT)
Dept: LAB | Facility: CLINIC | Age: 72
End: 2022-12-29

## 2023-01-03 ENCOUNTER — OFFICE VISIT (OUTPATIENT)
Dept: FAMILY MEDICINE CLINIC | Facility: CLINIC | Age: 73
End: 2023-01-03

## 2023-01-03 VITALS
HEIGHT: 69 IN | HEART RATE: 78 BPM | DIASTOLIC BLOOD PRESSURE: 64 MMHG | SYSTOLIC BLOOD PRESSURE: 114 MMHG | WEIGHT: 237.8 LBS | BODY MASS INDEX: 35.22 KG/M2 | OXYGEN SATURATION: 97 % | TEMPERATURE: 97.5 F

## 2023-01-03 DIAGNOSIS — E11.40 CONTROLLED TYPE 2 DIABETES MELLITUS WITH DIABETIC NEUROPATHY, WITH LONG-TERM CURRENT USE OF INSULIN (HCC): ICD-10-CM

## 2023-01-03 DIAGNOSIS — E11.22 CONTROLLED TYPE 2 DIABETES MELLITUS WITH STAGE 3 CHRONIC KIDNEY DISEASE, WITH LONG-TERM CURRENT USE OF INSULIN (HCC): ICD-10-CM

## 2023-01-03 DIAGNOSIS — E78.2 HYPERLIPEMIA, MIXED: ICD-10-CM

## 2023-01-03 DIAGNOSIS — Z79.4 CONTROLLED TYPE 2 DIABETES MELLITUS WITH DIABETIC NEUROPATHY, WITH LONG-TERM CURRENT USE OF INSULIN (HCC): ICD-10-CM

## 2023-01-03 DIAGNOSIS — K21.9 GASTROESOPHAGEAL REFLUX DISEASE WITHOUT ESOPHAGITIS: ICD-10-CM

## 2023-01-03 DIAGNOSIS — M1A.3790 CHRONIC GOUT DUE TO RENAL IMPAIRMENT INVOLVING TOE WITHOUT TOPHUS, UNSPECIFIED LATERALITY: ICD-10-CM

## 2023-01-03 DIAGNOSIS — Z79.4 CONTROLLED TYPE 2 DIABETES MELLITUS WITH STAGE 3 CHRONIC KIDNEY DISEASE, WITH LONG-TERM CURRENT USE OF INSULIN (HCC): ICD-10-CM

## 2023-01-03 DIAGNOSIS — D32.9 MENINGIOMA (HCC): ICD-10-CM

## 2023-01-03 DIAGNOSIS — N18.30 CONTROLLED TYPE 2 DIABETES MELLITUS WITH STAGE 3 CHRONIC KIDNEY DISEASE, WITH LONG-TERM CURRENT USE OF INSULIN (HCC): ICD-10-CM

## 2023-01-03 DIAGNOSIS — N18.31 STAGE 3A CHRONIC KIDNEY DISEASE (HCC): ICD-10-CM

## 2023-01-03 DIAGNOSIS — I25.810 CORONARY ARTERY DISEASE INVOLVING CORONARY BYPASS GRAFT OF NATIVE HEART WITHOUT ANGINA PECTORIS: Primary | ICD-10-CM

## 2023-01-03 DIAGNOSIS — E66.01 CLASS 2 SEVERE OBESITY DUE TO EXCESS CALORIES WITH SERIOUS COMORBIDITY AND BODY MASS INDEX (BMI) OF 37.0 TO 37.9 IN ADULT (HCC): ICD-10-CM

## 2023-01-03 PROBLEM — U07.1 2019 NOVEL CORONAVIRUS DISEASE (COVID-19): Status: RESOLVED | Noted: 2020-11-16 | Resolved: 2023-01-03

## 2023-01-03 NOTE — PROGRESS NOTES
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  No gout attacks  Continue allopurinol  Class 2 severe obesity due to excess calories with serious comorbidity and body mass index (BMI) of 37 0 to 37 9 in adult [facility])  He is having good results with just limiting his carbohydrates and calorie intake  Controlled type 2 diabetes mellitus with diabetic neuropathy, with long-term current use of insulin (HCC)    Lab Results   Component Value Date    HGBA1C 6 3 (H) 12/29/2022   Excellent glycemic control on metformin, Toujeo, and Victoza  Suspect his weight loss is actually contributing to improved control as well  Coronary artery disease involving coronary bypass graft of native heart without angina pectoris  No angina  Continue current regimen and risk factor modification  Gastroesophageal reflux disease without esophagitis  He is not having any GERD even without medication  Continue to monitor  Meningioma right parasellar [facility])  He is going to schedule a follow-up with his neurosurgeon at [facility]  His meningioma did increase slightly  Type 2 diabetes mellitus, controlled, with renal complications (HCC)    Lab Results   Component Value Date    HGBA1C 6 3 (H) 12/29/2022   His renal function is stable  Continue current regimen and monitor renal function  BMI Counseling: Body mass index is 35 12 kg/m²  The BMI is above normal  Nutrition recommendations include moderation in carbohydrate intake  Rationale for BMI follow-up plan is due to patient being overweight or obese  Depression Screening and Follow-up Plan: Patient was screened for depression during today's encounter  They screened negative with a PHQ-2 score of 0  I suggested that he apply lotion to his feet daily because of the dry skin  Chief Complaint    Follow-up; Care Gap Request         Patient ID: Kiran Russell is a 81 y o  male who returns for routine follow up   He has lost 10 lbs since October by limiting carbs and limiting calories to <2000 calories per day  He has not had any chest pain or shortness of breath  No gout attacks  He has not been back to see the neurosurgeon for his meningioma follow-up yet  He did have the MRI in September  Objective:    /64 (BP Location: Right arm, Cuff Size: Large)   Pulse 78   Temp 97 5 °F (36 4 °C)   Ht 5' 9" (1 753 m)   Wt 108 kg (237 lb 12 8 oz)   SpO2 97%   BMI 35 12 kg/m²     Wt Readings from Last 3 Encounters:   01/03/23 108 kg (237 lb 12 8 oz)   11/15/22 110 kg (241 lb 9 6 oz)   10/03/22 112 kg (247 lb 3 2 oz)         Physical Exam    General:  Well-developed, well-nourished, in no acute distress  Cardiac:  Regular rate and rhythm, no murmur, gallop, or rub  There is no JVD or HJR  Lungs:  Clear to auscultation and percussion  Abdomen:  Soft, nontender, normoactive bowel sounds, no palpable masses, no hepatosplenomegaly  Extremities:  No clubbing, cyanosis, or edema  Diabetic Foot Exam    Patient's shoes and socks removed  Right Foot/Ankle   Right Foot Inspection  Skin Exam: skin normal, skin intact and dry skin  No warmth, no callus, no erythema, no maceration, no abnormal color, no pre-ulcer, no ulcer and no callus  Sensory   Monofilament testing: intact    Vascular  The right DP pulse is 2+  The right PT pulse is 2+  Left Foot/Ankle  Left Foot Inspection  Skin Exam: skin normal, skin intact and dry skin  No warmth, no erythema, no maceration, normal color, no pre-ulcer, no ulcer and no callus  Sensory   Monofilament testing: intact    Vascular  The left DP pulse is 2+  The left PT pulse is 2+       Assign Risk Category  No deformity present  No loss of protective sensation  No weak pulses  Risk: 0

## 2023-01-03 NOTE — ASSESSMENT & PLAN NOTE
Lab Results   Component Value Date    HGBA1C 6 3 (H) 12/29/2022   His renal function is stable  Continue current regimen and monitor renal function

## 2023-01-03 NOTE — ASSESSMENT & PLAN NOTE
He is going to schedule a follow-up with his neurosurgeon at [facility]  His meningioma did increase slightly

## 2023-01-03 NOTE — ASSESSMENT & PLAN NOTE
Lab Results   Component Value Date    HGBA1C 6 3 (H) 12/29/2022   Excellent glycemic control on metformin, Toujeo, and Victoza  Suspect his weight loss is actually contributing to improved control as well

## 2023-01-28 DIAGNOSIS — I10 ESSENTIAL HYPERTENSION: ICD-10-CM

## 2023-01-30 RX ORDER — AMLODIPINE BESYLATE AND BENAZEPRIL HYDROCHLORIDE 5; 20 MG/1; MG/1
CAPSULE ORAL
Qty: 90 CAPSULE | Refills: 3 | Status: SHIPPED | OUTPATIENT
Start: 2023-01-30

## 2023-02-11 DIAGNOSIS — E11.40 CONTROLLED TYPE 2 DIABETES MELLITUS WITH DIABETIC NEUROPATHY, WITH LONG-TERM CURRENT USE OF INSULIN (HCC): ICD-10-CM

## 2023-02-11 DIAGNOSIS — Z79.4 CONTROLLED TYPE 2 DIABETES MELLITUS WITH DIABETIC NEUROPATHY, WITH LONG-TERM CURRENT USE OF INSULIN (HCC): ICD-10-CM

## 2023-02-13 RX ORDER — GABAPENTIN 300 MG/1
CAPSULE ORAL
Qty: 270 CAPSULE | Refills: 3 | Status: SHIPPED | OUTPATIENT
Start: 2023-02-13

## 2023-03-23 DIAGNOSIS — E11.40 CONTROLLED TYPE 2 DIABETES MELLITUS WITH DIABETIC NEUROPATHY, WITH LONG-TERM CURRENT USE OF INSULIN (HCC): ICD-10-CM

## 2023-03-23 DIAGNOSIS — Z79.4 CONTROLLED TYPE 2 DIABETES MELLITUS WITH DIABETIC NEUROPATHY, WITH LONG-TERM CURRENT USE OF INSULIN (HCC): ICD-10-CM

## 2023-03-23 RX ORDER — LIRAGLUTIDE 6 MG/ML
INJECTION SUBCUTANEOUS
Qty: 9 ML | Refills: 5 | Status: SHIPPED | OUTPATIENT
Start: 2023-03-23

## 2023-04-27 ENCOUNTER — APPOINTMENT (OUTPATIENT)
Dept: LAB | Facility: CLINIC | Age: 73
End: 2023-04-27

## 2023-05-02 ENCOUNTER — OFFICE VISIT (OUTPATIENT)
Dept: FAMILY MEDICINE CLINIC | Facility: CLINIC | Age: 73
End: 2023-05-02

## 2023-05-02 VITALS
DIASTOLIC BLOOD PRESSURE: 74 MMHG | OXYGEN SATURATION: 96 % | TEMPERATURE: 97.6 F | HEIGHT: 69 IN | BODY MASS INDEX: 35.63 KG/M2 | WEIGHT: 240.6 LBS | SYSTOLIC BLOOD PRESSURE: 124 MMHG | HEART RATE: 73 BPM

## 2023-05-02 DIAGNOSIS — E11.22 CONTROLLED TYPE 2 DIABETES MELLITUS WITH STAGE 3 CHRONIC KIDNEY DISEASE, WITH LONG-TERM CURRENT USE OF INSULIN (HCC): Primary | ICD-10-CM

## 2023-05-02 DIAGNOSIS — Z79.4 CONTROLLED TYPE 2 DIABETES MELLITUS WITH STAGE 3 CHRONIC KIDNEY DISEASE, WITH LONG-TERM CURRENT USE OF INSULIN (HCC): Primary | ICD-10-CM

## 2023-05-02 DIAGNOSIS — Z79.4 CONTROLLED TYPE 2 DIABETES MELLITUS WITH DIABETIC NEUROPATHY, WITH LONG-TERM CURRENT USE OF INSULIN (HCC): ICD-10-CM

## 2023-05-02 DIAGNOSIS — N18.30 CONTROLLED TYPE 2 DIABETES MELLITUS WITH STAGE 3 CHRONIC KIDNEY DISEASE, WITH LONG-TERM CURRENT USE OF INSULIN (HCC): Primary | ICD-10-CM

## 2023-05-02 DIAGNOSIS — E11.40 CONTROLLED TYPE 2 DIABETES MELLITUS WITH DIABETIC NEUROPATHY, WITH LONG-TERM CURRENT USE OF INSULIN (HCC): ICD-10-CM

## 2023-05-02 DIAGNOSIS — D32.9 MENINGIOMA (HCC): ICD-10-CM

## 2023-05-02 DIAGNOSIS — N18.31 STAGE 3A CHRONIC KIDNEY DISEASE (HCC): ICD-10-CM

## 2023-05-02 DIAGNOSIS — I10 ESSENTIAL HYPERTENSION: ICD-10-CM

## 2023-05-02 LAB
CREAT UR-MCNC: 160 MG/DL
MICROALBUMIN UR-MCNC: 108 MG/L (ref 0–20)
MICROALBUMIN/CREAT 24H UR: 68 MG/G CREATININE (ref 0–30)

## 2023-05-02 NOTE — ASSESSMENT & PLAN NOTE
Lab Results   Component Value Date    HGBA1C 6 3 (H) 12/29/2022   His A1c is still pending today but his diabetic control seems to be stable  Continue liraglutide 1 8 mg daily, metformin, and Toujeo

## 2023-05-02 NOTE — PROGRESS NOTES
"Assessment/Plan:    Problem List Items Addressed This Visit        Endocrine    Controlled type 2 diabetes mellitus with diabetic neuropathy, with long-term current use of insulin ([facility] [address] )       Lab Results   Component Value Date    HGBA1C 6 3 (H) 12/29/2022   His A1c is still pending today but his diabetic control seems to be stable  Continue liraglutide 1 8 mg daily, metformin, and Toujeo  Relevant Orders    Hemoglobin A1C    Lipid panel    Basic metabolic panel    Type 2 diabetes mellitus, controlled, with renal complications ([facility] [address] ) - Primary    Relevant Orders    Microalbumin / creatinine urine ratio       Cardiovascular and Mediastinum    Essential hypertension     Good blood pressure control on current regimen  Nervous and Auditory    Meningioma right parasellar [facility])     His follow-up MRI in the fall showed some enlargement of the meningioma  He has to schedule a follow-up with his neurosurgeon at [facility]  Genitourinary    Stage 3a chronic kidney disease [facility])     Lab Results   Component Value Date    EGFR 46 04/27/2023    EGFR 46 12/29/2022    EGFR 42 09/29/2022    CREATININE 1 50 (H) 04/27/2023    CREATININE 1 51 (H) 12/29/2022    CREATININE 1 62 (H) 09/29/2022   His renal function is stable  Continue to monitor  Chief Complaint    Follow-up; Care Gap Request         Patient ID: Alejo Blue is a 72 y o  male who returns for routine follow up  He has been seeing [place] [facility]  He was referred there because he had some corneal irregularity and then he was noted to have a problem with his right retina  He has had difficulty focusing  He checks his blood sugars in the mornings mostly and gets around 120  Before bedtime he may get readings around 160         Objective:    /74 (BP Location: Left arm, Cuff Size: Large)   Pulse 73   Temp 97 6 °F (36 4 °C)   Ht 5' 9\" (1 753 m)   Wt 109 kg (240 lb 9 6 oz)   SpO2 96%   BMI 35 53 kg/m²     Wt " Readings from Last 3 Encounters:   05/02/23 109 kg (240 lb 9 6 oz)   01/03/23 108 kg (237 lb 12 8 oz)   11/15/22 110 kg (241 lb 9 6 oz)         Physical Exam    General:  Well-developed, well-nourished, in no acute distress  Cardiac:  Regular rate and rhythm, no murmur, gallop, or rub  There is no JVD or HJR  Lungs:  Clear to auscultation and percussion  Abdomen:  Soft, nontender, normoactive bowel sounds, no palpable masses, no hepatosplenomegaly  Extremities:  No clubbing, cyanosis, or edema

## 2023-05-02 NOTE — ASSESSMENT & PLAN NOTE
His follow-up MRI in the fall showed some enlargement of the meningioma  He has to schedule a follow-up with his neurosurgeon at [facility]

## 2023-05-02 NOTE — ASSESSMENT & PLAN NOTE
Lab Results   Component Value Date    EGFR 46 04/27/2023    EGFR 46 12/29/2022    EGFR 42 09/29/2022    CREATININE 1 50 (H) 04/27/2023    CREATININE 1 51 (H) 12/29/2022    CREATININE 1 62 (H) 09/29/2022   His renal function is stable  Continue to monitor

## 2023-06-03 DIAGNOSIS — I25.810 CORONARY ARTERY DISEASE INVOLVING CORONARY BYPASS GRAFT OF NATIVE HEART WITHOUT ANGINA PECTORIS: ICD-10-CM

## 2023-06-03 DIAGNOSIS — E78.2 HYPERLIPEMIA, MIXED: ICD-10-CM

## 2023-06-05 RX ORDER — ROSUVASTATIN CALCIUM 20 MG/1
TABLET, COATED ORAL
Qty: 90 TABLET | Refills: 1 | Status: SHIPPED | OUTPATIENT
Start: 2023-06-05

## 2023-07-02 DIAGNOSIS — I10 ESSENTIAL HYPERTENSION: ICD-10-CM

## 2023-07-03 RX ORDER — HYDROCHLOROTHIAZIDE 25 MG/1
TABLET ORAL
Qty: 90 TABLET | Refills: 3 | Status: SHIPPED | OUTPATIENT
Start: 2023-07-03

## 2023-07-29 DIAGNOSIS — Z79.4 CONTROLLED TYPE 2 DIABETES MELLITUS WITH DIABETIC NEUROPATHY, WITH LONG-TERM CURRENT USE OF INSULIN (HCC): ICD-10-CM

## 2023-07-29 DIAGNOSIS — E11.40 CONTROLLED TYPE 2 DIABETES MELLITUS WITH DIABETIC NEUROPATHY, WITH LONG-TERM CURRENT USE OF INSULIN (HCC): ICD-10-CM

## 2023-07-31 ENCOUNTER — RA CDI HCC (OUTPATIENT)
Dept: OTHER | Facility: HOSPITAL | Age: 73
End: 2023-07-31

## 2023-07-31 RX ORDER — INSULIN GLARGINE 300 U/ML
INJECTION, SOLUTION SUBCUTANEOUS
Qty: 9 ML | Refills: 5 | Status: SHIPPED | OUTPATIENT
Start: 2023-07-31

## 2023-07-31 NOTE — PROGRESS NOTES
[address] [facility] coding opportunities       Chart reviewed, no opportunity found: CHART REVIEWED, NO OPPORTUNITY FOUND        Patients Insurance     Medicare Insurance: Medicare

## 2023-09-07 ENCOUNTER — APPOINTMENT (OUTPATIENT)
Dept: LAB | Facility: CLINIC | Age: 73
End: 2023-09-07
Payer: MEDICARE

## 2023-09-07 LAB
ANION GAP SERPL CALCULATED.3IONS-SCNC: 9 MMOL/L
BUN SERPL-MCNC: 19 MG/DL (ref 5–25)
CALCIUM SERPL-MCNC: 9.1 MG/DL (ref 8.4–10.2)
CHLORIDE SERPL-SCNC: 106 MMOL/L (ref 96–108)
CHOLEST SERPL-MCNC: 112 MG/DL
CO2 SERPL-SCNC: 28 MMOL/L (ref 21–32)
CREAT SERPL-MCNC: 1.42 MG/DL (ref 0.6–1.3)
EST. AVERAGE GLUCOSE BLD GHB EST-MCNC: 157 MG/DL
GFR SERPL CREATININE-BSD FRML MDRD: 49 ML/MIN/1.73SQ M
GLUCOSE P FAST SERPL-MCNC: 113 MG/DL (ref 65–99)
HBA1C MFR BLD: 7.1 %
HDLC SERPL-MCNC: 39 MG/DL
LDLC SERPL CALC-MCNC: 33 MG/DL (ref 0–100)
NONHDLC SERPL-MCNC: 73 MG/DL
POTASSIUM SERPL-SCNC: 4.2 MMOL/L (ref 3.5–5.3)
SODIUM SERPL-SCNC: 143 MMOL/L (ref 135–147)
TRIGL SERPL-MCNC: 200 MG/DL

## 2023-09-12 ENCOUNTER — OFFICE VISIT (OUTPATIENT)
Dept: FAMILY MEDICINE CLINIC | Facility: CLINIC | Age: 73
End: 2023-09-12
Payer: MEDICARE

## 2023-09-12 VITALS
HEIGHT: 69 IN | OXYGEN SATURATION: 95 % | BODY MASS INDEX: 35.66 KG/M2 | DIASTOLIC BLOOD PRESSURE: 80 MMHG | WEIGHT: 240.8 LBS | SYSTOLIC BLOOD PRESSURE: 148 MMHG | HEART RATE: 74 BPM

## 2023-09-12 DIAGNOSIS — I25.810 CORONARY ARTERY DISEASE INVOLVING CORONARY BYPASS GRAFT OF NATIVE HEART WITHOUT ANGINA PECTORIS: ICD-10-CM

## 2023-09-12 DIAGNOSIS — E66.01 CLASS 2 SEVERE OBESITY DUE TO EXCESS CALORIES WITH SERIOUS COMORBIDITY AND BODY MASS INDEX (BMI) OF 37.0 TO 37.9 IN ADULT: ICD-10-CM

## 2023-09-12 DIAGNOSIS — R25.1 TREMOR: ICD-10-CM

## 2023-09-12 DIAGNOSIS — Z79.4 CONTROLLED TYPE 2 DIABETES MELLITUS WITH STAGE 3 CHRONIC KIDNEY DISEASE, WITH LONG-TERM CURRENT USE OF INSULIN (HCC): Primary | ICD-10-CM

## 2023-09-12 DIAGNOSIS — E11.22 CONTROLLED TYPE 2 DIABETES MELLITUS WITH STAGE 3 CHRONIC KIDNEY DISEASE, WITH LONG-TERM CURRENT USE OF INSULIN (HCC): Primary | ICD-10-CM

## 2023-09-12 DIAGNOSIS — E78.2 HYPERLIPEMIA, MIXED: ICD-10-CM

## 2023-09-12 DIAGNOSIS — N18.30 CONTROLLED TYPE 2 DIABETES MELLITUS WITH STAGE 3 CHRONIC KIDNEY DISEASE, WITH LONG-TERM CURRENT USE OF INSULIN (HCC): Primary | ICD-10-CM

## 2023-09-12 DIAGNOSIS — I10 ESSENTIAL HYPERTENSION: ICD-10-CM

## 2023-09-12 DIAGNOSIS — N18.31 STAGE 3A CHRONIC KIDNEY DISEASE (HCC): ICD-10-CM

## 2023-09-12 PROCEDURE — G0439 PPPS, SUBSEQ VISIT: HCPCS | Performed by: INTERNAL MEDICINE

## 2023-09-12 PROCEDURE — 99214 OFFICE O/P EST MOD 30 MIN: CPT | Performed by: INTERNAL MEDICINE

## 2023-09-12 NOTE — ASSESSMENT & PLAN NOTE
Blood pressure slightly above goal today. We will continue current regimen and reassess at next visit.

## 2023-09-12 NOTE — ASSESSMENT & PLAN NOTE
Unclear etiology. No evidence of Parkinson's disease today. We will check TSH before next visit. Suggested eliminating caffeine to see if this has any effect on the frequency of the tremor.

## 2023-09-12 NOTE — ASSESSMENT & PLAN NOTE
Lab Results   Component Value Date    HGBA1C 7.1 (H) 09/07/2023   His A1c went up a little bit. We are going to stop Victoza as of tomorrow and start semaglutide 0.25 mg weekly for 4 weeks and then increase to 0.5 mg weekly.

## 2023-09-12 NOTE — ASSESSMENT & PLAN NOTE
Lab Results   Component Value Date    EGFR 49 09/07/2023    EGFR 46 04/27/2023    EGFR 46 12/29/2022    CREATININE 1.42 (H) 09/07/2023    CREATININE 1.50 (H) 04/27/2023    CREATININE 1.51 (H) 12/29/2022   Renal function is at baseline. Continue to follow.

## 2023-09-12 NOTE — ASSESSMENT & PLAN NOTE
Excellent LDL on rosuvastatin. Triglycerides elevated at 200 but these should drop with improved diabetic control and weight loss.

## 2023-09-12 NOTE — PROGRESS NOTES
Assessment and Plan:     Problem List Items Addressed This Visit        Endocrine    Type 2 diabetes mellitus, controlled, with renal complications ([address]) - Primary       Lab Results   Component Value Date    HGBA1C 7.1 (H) 09/07/2023   His A1c went up a little bit. We are going to stop Victoza as of tomorrow and start semaglutide 0.25 mg weekly for 4 weeks and then increase to 0.5 mg weekly. Relevant Medications    semaglutide, 0.25 or 0.5 mg/dose, (Ozempic, 0.25 or 0.5 MG/DOSE,) 2 mg/3 mL injection pen       Cardiovascular and Mediastinum    Coronary artery disease involving coronary bypass graft of native heart without angina pectoris     No angina or shortness of breath. Continue risk factor modification. Essential hypertension     Blood pressure slightly above goal today. We will continue current regimen and reassess at next visit. Genitourinary    Stage 3a chronic kidney disease [facility])     Lab Results   Component Value Date    EGFR 49 09/07/2023    EGFR 46 04/27/2023    EGFR 46 12/29/2022    CREATININE 1.42 (H) 09/07/2023    CREATININE 1.50 (H) 04/27/2023    CREATININE 1.51 (H) 12/29/2022   Renal function is at baseline. Continue to follow. Other    Hyperlipemia, mixed     Excellent LDL on rosuvastatin. Triglycerides elevated at 200 but these should drop with improved diabetic control and weight loss. Class 2 severe obesity due to excess calories with serious comorbidity and body mass index (BMI) of 37.0 to 37.9 in adult [facility])    Tremor     Unclear etiology. No evidence of Parkinson's disease today. We will check TSH before next visit. Suggested eliminating caffeine to see if this has any effect on the frequency of the tremor. Relevant Orders    TSH, 3rd generation with Free T4 reflex       BMI Counseling: Body mass index is 35.56 kg/m². The BMI is above normal. Pharmacotherapy was ordered to help aid in weight loss.      Depression Screening and Follow-up Plan: Patient was screened for depression during today's encounter. They screened negative with a PHQ-2 score of 2. Preventive health issues were discussed with patient, and age appropriate screening tests were ordered as noted in patient's After Visit Summary. Personalized health advice and appropriate referrals for health education or preventive services given if needed, as noted in patient's After Visit Summary. History of Present Illness:     Patient presents for a Medicare Wellness Visit. He has noticed over the past 3 months a tremor in his left arm for about an hour a few times a week. He notes the tremor at rest. No family history of tremor. He hasn't noticed any change in his handwriting, no gait problems. His morning and evening blood sugars are around 125.      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   • Class 2 severe obesity due to excess calories with serious comorbidity and body mass index (BMI) of 37.0 to 37.9 in adult [facility])   • Stage 3a chronic kidney disease ([address])   • Memory loss   • Meningioma right parasellar [facility])   • Essential hypertension   • Tremor      Past Medical and Surgical History:     Past Medical History:   Diagnosis Date   • 2019 novel coronavirus disease (COVID-19) 11/16/2020   • Abdominal pain, epigastric 8/10/2020   • [place] (acute kidney injury) ([address]) 12/7/2020   • Coronary artery disease    • COVID-19 virus infection 11/16/2020   • Diabetes mellitus (HCC)    • Gout     Podagra alternating toes. No gout attacks for 20 years after staring allopurinol   • Heparin induced thrombocytopenia (HCC)     diagnosed at the time of the CABG, had bilateral lower extremity venous clots. 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 He has been following with an ophthalmologist.  Apparently does not have any visual field defect. Past Surgical History:   Procedure Laterality Date   • CORONARY ARTERY BYPASS GRAFT  05/22/2018    [facility]. CABGx 4 (LIMA D1, radial artery to ramus intermedius, SVG to PDA, SVG to PLOM. • HERNIA REPAIR     • KNEE ARTHROSCOPY W/ MENISCECTOMY Right    • KNEE SURGERY     • TONSILLECTOMY        Family History:     Family History   Problem Relation Age of Onset   • Hypertension Mother    • Hyperlipidemia Mother    • Heart failure Mother    • Macular degeneration Father    • No Known Problems Sister    • No Known Problems Sister    • Dementia Paternal Grandmother    • Arthritis Paternal Grandmother    • Glaucoma Maternal Grandmother       Social History:     Social History     Socioeconomic History   • Marital status: /Civil Union     Spouse name: None   • Number of children: None   • Years of education: None   • Highest education level: None   Occupational History   • None   Tobacco Use   • Smoking status: Never   • Smokeless tobacco: Never   Vaping Use   • Vaping Use: Never used   Substance and Sexual Activity   • Alcohol use: Never   • Drug use: Never   • Sexual activity: Not Currently   Other Topics Concern   • None   Social History Narrative    Currently works as a manager for [employer]. Grew up in [place].   with 1 living child. Reid Hammond son  secondary to a toxic reaction to a supplement and OTC medication . Social Determinants of Health     Financial Resource Strain: Low Risk  (2023)    Overall Financial Resource Strain (CARDIA)    • Difficulty of Paying Living Expenses: Not very hard   Food Insecurity: Not on file   Transportation Needs: No Transportation Needs (2023)    PRAPARE - Transportation    • Lack of Transportation (Medical): No    • Lack of Transportation (Non-Medical): No   Physical Activity: Not on file   Stress: Not on file   Social Connections: Not on file   Intimate Partner Violence: Not on file   Housing Stability: Not on file      Medications and Allergies:     Current Outpatient Medications   Medication Sig Dispense Refill   • allopurinol (ZYLOPRIM) 300 mg tablet take 1 tablet by mouth once daily 90 tablet 3   • amLODIPine-benazepril (LOTREL 5-20) 5-20 MG per capsule take 1 capsule by mouth once daily 90 capsule 3   • aspirin 81 MG tablet Take 81 mg by mouth daily     • Cholecalciferol (VITAMIN D3) 125 MCG (5000 UT) CAPS Take 5,000 Units by mouth daily     • gabapentin (NEURONTIN) 300 mg capsule Take 1 capsule (300 mg total) by mouth daily AND 2 capsules (600 mg total) daily at bedtime. 270 capsule 3   • hydrochlorothiazide (HYDRODIURIL) 25 mg tablet take 1 tablet by mouth once daily 90 tablet 3   • metFORMIN (GLUCOPHAGE) 500 mg tablet take 2 tablets by mouth twice a day with meals 360 tablet 3   • metoprolol tartrate (LOPRESSOR) 25 mg tablet take 1 tablet by mouth every 12 hours 180 tablet 1   • rosuvastatin (CRESTOR) 20 MG tablet take 1 tablet by mouth once daily 90 tablet 1   • semaglutide, 0.25 or 0.5 mg/dose, (Ozempic, 0.25 or 0.5 MG/DOSE,) 2 mg/3 mL injection pen Inject 0.38 mL (0.2533 mg total) under the skin once a week for 28 days, THEN 0.75 mL (0.5 mg total) once a week for 28 days.  3 mL 5   • Toujeo Max SoloStar 300 units/mL CONCENTRATED U-300 injection pen (2-unit dial) inject 26 units subcutaneously at bedtime 9 mL 5   • vitamin B-12 (VITAMIN B-12) 1,000 mcg tablet Take 1,000 mcg by mouth daily       No current facility-administered medications for this visit. Allergies   Allergen Reactions   • Heparin Other (See Comments)     Heparin induced thrombocytopenia and thrombosis manifested by severe thrombocytopenia and bilateral lower extremity DVT      Immunizations:     Immunization History   Administered Date(s) Administered   • COVID-19 MODERNA VACC 0.5 ML IM 03/26/2021, 04/28/2021, 11/06/2021   • COVID-19, unspecified 08/13/2022   • Influenza, high dose seasonal 0.7 mL 09/24/2019, 11/06/2020, 09/27/2021, 10/03/2022      Health Maintenance:         Topic Date Due   • Colorectal Cancer Screening  02/04/2026   • Hepatitis C Screening  Completed         Topic Date Due   • Pneumococcal Vaccine: 65+ Years (1 - PCV) Never done   • Influenza Vaccine (1) 09/01/2023      Medicare Screening Tests and Risk Assessments:     Elizabeth Zuniga is here for his Subsequent Wellness visit. Health Risk Assessment:   Patient rates overall health as good. Patient feels that their physical health rating is slightly better. Patient is satisfied with their life. Eyesight was rated as slightly worse. Hearing was rated as slightly worse. Patient feels that their emotional and mental health rating is same. Patients states they are sometimes angry. Patient states they are sometimes unusually tired/fatigued. Pain experienced in the last 7 days has been some. Patient's pain rating has been 5/10. Patient states that he has experienced no weight loss or gain in last 6 months. He has hip pains when he gets up in the morning, eases when he walks around. Depression Screening:   PHQ-2 Score: 2      Fall Risk Screening: In the past year, patient has experienced: no history of falling in past year      Home Safety:  Patient does not have trouble with stairs inside or outside of their home.  Patient has working smoke alarms and has working carbon monoxide detector. Home safety hazards include: none. Nutrition:   Current diet is Regular. Medications:   Patient is currently taking over-the-counter supplements. OTC medications include: see medication list. Patient is able to manage medications. Activities of Daily Living (ADLs)/Instrumental Activities of Daily Living (IADLs):   Walk and transfer into and out of bed and chair?: Yes  Dress and groom yourself?: Yes    Bathe or shower yourself?: Yes    Feed yourself? Yes  Do your laundry/housekeeping?: Yes  Manage your money, pay your bills and track your expenses?: Yes  Make your own meals?: Yes    Do your own shopping?: Yes    Durable Medical Equipment Suppliers  none    Previous Hospitalizations:   Any hospitalizations or ED visits within the last 12 months?: No      Advance Care Planning:   Living will: No    Durable POA for healthcare: No    Advanced directive: No    Advanced directive counseling given: Yes    End of Life Decisions reviewed with patient: Yes    Provider agrees with end of life decisions: Yes      Comments: India Guadalupe chooses comfort care measures in the event of a terminal diagnosis.        Cognitive Screening:   Provider or family/friend/caregiver concerned regarding cognition?: No    PREVENTIVE SCREENINGS      Cardiovascular Screening:    General: Screening Not Indicated and History Lipid Disorder      Diabetes Screening:     General: Screening Not Indicated and History Diabetes      Colorectal Cancer Screening:     General: Screening Current      Prostate Cancer Screening:    General: Risks and Benefits Discussed and Patient Declines      Abdominal Aortic Aneurysm (AAA) Screening:    Risk factors include: age between 70-75 yo        Lung Cancer Screening:     General: Screening Not Indicated      Hepatitis C Screening:    General: Screening Current    Screening, Brief Intervention, and Referral to Treatment (SBIRT)    Screening  Typical number of drinks in a day: 0  Typical number of drinks in a week: 0  Interpretation: Low risk drinking behavior. AUDIT-C Screenin) How often did you have a drink containing alcohol in the past year? monthly or less  2) How many drinks did you have on a typical day when you were drinking in the past year? 0  3) How often did you have 6 or more drinks on one occasion in the past year? never    AUDIT-C Score: 1  Interpretation: Score 0-3 (male): Negative screen for alcohol misuse    Single Item Drug Screening:  How often have you used an illegal drug (including marijuana) or a prescription medication for non-medical reasons in the past year? never    Single Item Drug Screen Score: 0  Interpretation: Negative screen for possible drug use disorder    No results found. Physical Exam:     /80 (BP Location: Right arm, Patient Position: Sitting, Cuff Size: Large)   Pulse 74   Ht 5' 9" (1.753 m)   Wt 109 kg (240 lb 12.8 oz)   SpO2 95%   BMI 35.56 kg/m²     Physical Exam  Constitutional:       General: He is not in acute distress. Appearance: Normal appearance. He is well-developed. He is not ill-appearing. Neck:      Vascular: No JVD. Cardiovascular:      Rate and Rhythm: Normal rate and regular rhythm. Heart sounds: Normal heart sounds. No murmur heard. No friction rub. No gallop. Pulmonary:      Breath sounds: Normal breath sounds. Abdominal:      General: Bowel sounds are normal. There is no distension. Palpations: Abdomen is soft. There is no mass. Musculoskeletal:         General: No swelling. Neurological:      Mental Status: He is alert. Comments: No resting or intention tremor noted. No cogwheeling rigidity. Does not have masked facies.           Fab Wong MD

## 2023-10-15 DIAGNOSIS — E11.40 CONTROLLED TYPE 2 DIABETES MELLITUS WITH DIABETIC NEUROPATHY, WITH LONG-TERM CURRENT USE OF INSULIN (HCC): ICD-10-CM

## 2023-10-15 DIAGNOSIS — Z79.4 CONTROLLED TYPE 2 DIABETES MELLITUS WITH DIABETIC NEUROPATHY, WITH LONG-TERM CURRENT USE OF INSULIN (HCC): ICD-10-CM

## 2023-10-16 DIAGNOSIS — I25.810 CORONARY ARTERY DISEASE INVOLVING CORONARY BYPASS GRAFT OF NATIVE HEART WITHOUT ANGINA PECTORIS: ICD-10-CM

## 2023-12-04 DIAGNOSIS — E78.2 HYPERLIPEMIA, MIXED: ICD-10-CM

## 2023-12-04 RX ORDER — ROSUVASTATIN CALCIUM 20 MG/1
TABLET, COATED ORAL
Qty: 90 TABLET | Refills: 3 | Status: SHIPPED | OUTPATIENT
Start: 2023-12-04

## 2023-12-14 ENCOUNTER — APPOINTMENT (OUTPATIENT)
Dept: LAB | Facility: CLINIC | Age: 73
End: 2023-12-14
Payer: MEDICARE

## 2023-12-14 DIAGNOSIS — R25.1 TREMOR: ICD-10-CM

## 2023-12-14 LAB — TSH SERPL DL<=0.05 MIU/L-ACNC: 1.69 UIU/ML (ref 0.45–4.5)

## 2023-12-14 PROCEDURE — 84443 ASSAY THYROID STIM HORMONE: CPT

## 2023-12-19 ENCOUNTER — OFFICE VISIT (OUTPATIENT)
Dept: FAMILY MEDICINE CLINIC | Facility: CLINIC | Age: 73
End: 2023-12-19
Payer: MEDICARE

## 2023-12-19 VITALS
BODY MASS INDEX: 35.19 KG/M2 | OXYGEN SATURATION: 96 % | SYSTOLIC BLOOD PRESSURE: 114 MMHG | HEART RATE: 72 BPM | HEIGHT: 69 IN | WEIGHT: 237.6 LBS | DIASTOLIC BLOOD PRESSURE: 70 MMHG

## 2023-12-19 DIAGNOSIS — E78.2 HYPERLIPEMIA, MIXED: ICD-10-CM

## 2023-12-19 DIAGNOSIS — E66.01 CLASS 2 SEVERE OBESITY DUE TO EXCESS CALORIES WITH SERIOUS COMORBIDITY AND BODY MASS INDEX (BMI) OF 37.0 TO 37.9 IN ADULT: ICD-10-CM

## 2023-12-19 DIAGNOSIS — Z23 ENCOUNTER FOR IMMUNIZATION: ICD-10-CM

## 2023-12-19 DIAGNOSIS — I10 ESSENTIAL HYPERTENSION: Primary | ICD-10-CM

## 2023-12-19 DIAGNOSIS — E11.40 CONTROLLED TYPE 2 DIABETES MELLITUS WITH DIABETIC NEUROPATHY, WITH LONG-TERM CURRENT USE OF INSULIN (HCC): ICD-10-CM

## 2023-12-19 DIAGNOSIS — Z79.4 CONTROLLED TYPE 2 DIABETES MELLITUS WITH DIABETIC NEUROPATHY, WITH LONG-TERM CURRENT USE OF INSULIN (HCC): ICD-10-CM

## 2023-12-19 DIAGNOSIS — I25.810 CORONARY ARTERY DISEASE INVOLVING CORONARY BYPASS GRAFT OF NATIVE HEART WITHOUT ANGINA PECTORIS: ICD-10-CM

## 2023-12-19 DIAGNOSIS — D32.9 MENINGIOMA (HCC): ICD-10-CM

## 2023-12-19 DIAGNOSIS — N18.31 STAGE 3A CHRONIC KIDNEY DISEASE (HCC): ICD-10-CM

## 2023-12-19 PROCEDURE — 90662 IIV NO PRSV INCREASED AG IM: CPT | Performed by: INTERNAL MEDICINE

## 2023-12-19 PROCEDURE — G0008 ADMIN INFLUENZA VIRUS VAC: HCPCS | Performed by: INTERNAL MEDICINE

## 2023-12-19 PROCEDURE — 99214 OFFICE O/P EST MOD 30 MIN: CPT | Performed by: INTERNAL MEDICINE

## 2023-12-19 RX ORDER — SEMAGLUTIDE 0.68 MG/ML
0.5 INJECTION, SOLUTION SUBCUTANEOUS
COMMUNITY
Start: 2023-12-02 | End: 2023-12-19

## 2023-12-19 NOTE — PROGRESS NOTES
Assessment/Plan:    Problem List Items Addressed This Visit        Endocrine    Controlled type 2 diabetes mellitus with diabetic neuropathy, with long-term current use of insulin (HCC)    Relevant Medications    semaglutide, 1 mg/dose, (Ozempic, 1 MG/DOSE,) 4 mg/3 mL injection pen       Cardiovascular and Mediastinum    Coronary artery disease involving coronary bypass graft of native heart without angina pectoris     No angina or exertional dyspnea.  Continue risk factor modification.         Essential hypertension - Primary       Nervous and Auditory    Meningioma right parasellar (HCC)     We are going to set him up for the MRI for follow-up. Once we obtain the result, I will refer him to one of the neurosurgeons here, so he does not have to go to [facility].         Relevant Orders    MRI brain w wo contrast       Genitourinary    Stage 3a chronic kidney disease (HCC)     Lab Results   Component Value Date    EGFR 35 12/14/2023    EGFR 49 09/07/2023    EGFR 46 04/27/2023    CREATININE 1.88 (H) 12/14/2023    CREATININE 1.42 (H) 09/07/2023    CREATININE 1.50 (H) 04/27/2023             Other    Hyperlipemia, mixed    Class 2 severe obesity due to excess calories with serious comorbidity and body mass index (BMI) of 37.0 to 37.9 in adult    Other Visit Diagnoses     Encounter for immunization        Relevant Orders    influenza vaccine, high-dose, PF 0.7 mL (FLUZONE HIGH-DOSE) (Completed)      1. Diabetes.  I will increase his Ozempic from 0.5 mg to 1 mg once weekly. He will update me on MyChart on the developments by providing me details of his weight and blood glucose.    2. Hypertension.  His repeat blood pressure was normal. He will continue his current regimen.    3. Hypercholesterolemia.  His cholesterol levels are within the normal range. He will continue the rosuvastatin 20 mg once daily.    4. Elevated creatinine.  He is advised to drink a couple of glasses of water in the evening before the laboratory  test.    5. Follow-up  The patient will follow up in 3 months.  Chief Complaint    Follow-up; Care Gap Request         Patient ID: Kavon Calix is a 73 y.o. male who returns for routine follow up.    He monitors his blood glucose levels at home. He reports that it has gotten worse since he started with Ozempic. He recalls that the same trend occurred 10 or 15 years ago when he was prescribed with Trulicity, his blood glucose levels were elevated as well. He has been on Ozempic 0.5 mg once weekly for the past 2 months. There was a gap of 2 weeks before he started the dose as it was not available. He reverted to Victoza. He is tolerating the medication well. He notices that the medicine makes him feel full more quickly. He lost 3 pounds since starting Ozempic.     He is also on metformin 500 mg 2 times daily. He takes Toujeo 26 units at bedtime. He is also taking amlodipine and benazepril at 5-20 mg once daily. He takes hydrochlorothiazide 25 mg once daily and metoprolol at 25 mg 2 times daily.    His fasting blood glucose last week was 145 mg/dL. His current HbA1c result is 7.3% which is slightly elevated compared to the result in 09/2023 at 7.1%. His home blood glucose monitoring this morning while fasting is at 144 mg/dL. This reading is higher compared to his typical readings, which is around 120 mg/dL. He did not take anything last night that would increase his blood glucose level. His cholesterol level is at 107 mg/dL, his LDL cholesterol is at 17 mg/dL. His thyroid tests are normal, his TSH is at 1.690 mU/L. He was fasting when he had his kidney function test. He did not adequately hydrate himself with water prior to the test.    He does not report any chest pain, chest pressure, or shortness of breath when ambulating. He has not had any nausea, vomiting, bloating, abdominal pain, diarrhea, or constipation.    He has not seen his neurosurgeon at [facility] for more than 1 year. His last MRI in the fall of  "2022 showed an enlargement of the meningioma. A repeat MRI is required to determine the progress and would require a consultation with the neurosurgeon. He is concerned about his brain surgery. He is willing to do a repeat MRI. He has no claustrophobia, and he does not have any metallic implants.    He is taking his influenza vaccine today. He will get his COVID-19 booster vaccine at the local pharmacy.      Objective:    /70 (BP Location: Left arm, Cuff Size: Large)   Pulse 72   Ht 5' 9\" (1.753 m)   Wt 108 kg (237 lb 9.6 oz)   SpO2 96%   BMI 35.09 kg/m²     Wt Readings from Last 3 Encounters:   12/19/23 108 kg (237 lb 9.6 oz)   09/12/23 109 kg (240 lb 12.8 oz)   05/02/23 109 kg (240 lb 9.6 oz)         Physical Exam    General:  Well-developed, well-nourished, in no acute distress.  Cardiac:  Regular rate and rhythm, no murmur, gallop, or rub.  There is no JVD or HJR.  Lungs:  Clear to auscultation and percussion.  Abdomen:  Soft, nontender, normoactive bowel sounds, no palpable masses, no hepatosplenomegaly.  Extremities:  No clubbing, cyanosis, or edema.    Diabetic Foot Exam    Patient's shoes and socks removed.    Right Foot/Ankle   Right Foot Inspection  Skin Exam: skin normal and skin intact. No dry skin, no warmth, no callus, no erythema, no maceration, no abnormal color, no pre-ulcer, no ulcer and no callus.     Sensory   Monofilament testing: intact    Vascular  The right DP pulse is 2+. The right PT pulse is 2+.     Left Foot/Ankle  Left Foot Inspection  Skin Exam: skin normal and skin intact. No dry skin, no warmth, no erythema, no maceration, normal color, no pre-ulcer, no ulcer and no callus.     Sensory   Monofilament testing: intact    Vascular  The left DP pulse is 2+. The left PT pulse is 2+.     Assign Risk Category  No deformity present  No loss of protective sensation  No weak pulses  Risk: 0      Transcribed for Yeyo Castaneda MD, by Ileana Cabrera on 12/19/23 at 12:53 PM. " Powered by [employer].

## 2023-12-19 NOTE — ASSESSMENT & PLAN NOTE
Lab Results   Component Value Date    EGFR 35 12/14/2023    EGFR 49 09/07/2023    EGFR 46 04/27/2023    CREATININE 1.88 (H) 12/14/2023    CREATININE 1.42 (H) 09/07/2023    CREATININE 1.50 (H) 04/27/2023

## 2023-12-19 NOTE — ASSESSMENT & PLAN NOTE
We are going to set him up for the MRI for follow-up. Once we obtain the result, I will refer him to one of the neurosurgeons here, so he does not have to go to [facility].

## 2023-12-21 ENCOUNTER — HOSPITAL ENCOUNTER (OUTPATIENT)
Dept: MRI IMAGING | Facility: HOSPITAL | Age: 73
End: 2023-12-21
Attending: INTERNAL MEDICINE
Payer: MEDICARE

## 2023-12-21 DIAGNOSIS — D32.9 MENINGIOMA (HCC): ICD-10-CM

## 2023-12-21 PROCEDURE — 70553 MRI BRAIN STEM W/O & W/DYE: CPT

## 2023-12-21 PROCEDURE — G1004 CDSM NDSC: HCPCS

## 2023-12-21 PROCEDURE — A9585 GADOBUTROL INJECTION: HCPCS | Performed by: INTERNAL MEDICINE

## 2023-12-21 RX ORDER — GADOBUTROL 604.72 MG/ML
11 INJECTION INTRAVENOUS
Status: COMPLETED | OUTPATIENT
Start: 2023-12-21 | End: 2023-12-21

## 2023-12-21 RX ADMIN — GADOBUTROL 11 ML: 604.72 INJECTION INTRAVENOUS at 12:29

## 2023-12-26 DIAGNOSIS — Z79.4 CONTROLLED TYPE 2 DIABETES MELLITUS WITH DIABETIC NEUROPATHY, WITH LONG-TERM CURRENT USE OF INSULIN (HCC): ICD-10-CM

## 2023-12-26 DIAGNOSIS — E11.40 CONTROLLED TYPE 2 DIABETES MELLITUS WITH DIABETIC NEUROPATHY, WITH LONG-TERM CURRENT USE OF INSULIN (HCC): ICD-10-CM

## 2023-12-29 DIAGNOSIS — D32.9 MENINGIOMA (HCC): Primary | ICD-10-CM

## 2024-01-16 ENCOUNTER — OFFICE VISIT (OUTPATIENT)
Dept: NEUROSURGERY | Facility: CLINIC | Age: 74
End: 2024-01-16
Payer: MEDICARE

## 2024-01-16 VITALS
SYSTOLIC BLOOD PRESSURE: 140 MMHG | HEART RATE: 78 BPM | DIASTOLIC BLOOD PRESSURE: 84 MMHG | RESPIRATION RATE: 16 BRPM | BODY MASS INDEX: 35.55 KG/M2 | HEIGHT: 69 IN | OXYGEN SATURATION: 94 % | WEIGHT: 240 LBS | TEMPERATURE: 98.1 F

## 2024-01-16 DIAGNOSIS — D32.9 MENINGIOMA (HCC): ICD-10-CM

## 2024-01-16 DIAGNOSIS — I10 ESSENTIAL HYPERTENSION: ICD-10-CM

## 2024-01-16 DIAGNOSIS — D35.2 PITUITARY MICROADENOMA (HCC): Primary | ICD-10-CM

## 2024-01-16 PROCEDURE — 99204 OFFICE O/P NEW MOD 45 MIN: CPT | Performed by: NEUROLOGICAL SURGERY

## 2024-01-16 RX ORDER — AMLODIPINE BESYLATE AND BENAZEPRIL HYDROCHLORIDE 5; 20 MG/1; MG/1
CAPSULE ORAL
Qty: 90 CAPSULE | Refills: 1 | Status: SHIPPED | OUTPATIENT
Start: 2024-01-16

## 2024-01-16 NOTE — ASSESSMENT & PLAN NOTE
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   Pt states this pituitary lesion was initially dx upon workup of low testosterone when he was living in [place], [place] at [facility] in    He underwent surveillance imaging at 6 months and 1 year and it was reportedly stable in [place]  He moved to the [facility] in 2019 and underwent a repeat MRI for his yearly follow-up per his PCP   Was noted to have a new R cavernous meningioma and was evaluated by [facility] for this by a Dr. Montgomery.  At this appt told he had NO abnormality of the pituitary gland    Advised yearly follow-up in regard to this meningioma   Pt was briefly lost to follow-up with the COVID-19 pandemic but his PCP ordered MRI pituitary in dec 2023 for continued yearly follow-up   He was noted to have a slight increase in the meningioma and referred to  nsgy for evaluation   Today, offers no complaints. Denies any headaches, double vision, loss of vision, dizziness, weakness, numbness, seizures, AMS, LOC, etc.    Imagin2023 MRI brain pit w/wo: Meningioma along the right cavernous sinus, minimally increased in size compared to 2022. New signal alteration of the pituitary gland with central focus of differential/hypoenhancement which can be seen with gland heterogeneity or less likely a microadenoma. Mild chronic microangiopathic ischemic changes.    Plan:   Pt encouraged to continued to closely monitor neuro exam and sx   Pt educated on \"red flag\" s/sx to monitor for including HA, dizziness, vision changes/loss of peripheral vision/double vision, weakness, numbness, AMS, speech difficulty, seizure like activity, LOC, etc.  Imaging reviewed at length with the pt and Dr. Norton   Small, non-operative, likely benign lesions   No acute neurosurgical intervention indicated at this time   Recommend ongoing surveillance given slight " increase in size to this R cavernous meningioma   Will plan for repeat MRI brain pituitary w/wo in 1 year   Will follow-up with pt again upon completion of this MRI image  Appt to be scheduled as a joint appt with an AP and a brain surgeon in the practice   Pt agreeable to the above noted plan   All questions answered at this time   Pt encouraged to call the office with any further questions or concerns or should they experience any worsening sx

## 2024-01-16 NOTE — ASSESSMENT & PLAN NOTE
Pituitary Microadenoma   - asymptomatic     Imaging:   See above    Plan:   Recommend formal visual fields testing per ophthalmologist   Pt follow regularly with eye doctor, held on referral to  ophtho   Pt will obtain next month at this next appt   Will continue to closely monitor this likely benign, incidental lesion with yearly contrast MRI pituitary imaging.

## 2024-01-16 NOTE — PROGRESS NOTES
"Neurosurgery Office Note  Kavon Calix 73 y.o. male MRN: [id number]    Assessment/Plan   Meningioma  Pt presents to the  nsgy office today as a new pt referred by his PCP for evaluation in regard to a R cavernous meningioma and pituitary gland microadenoma.   Pt states this pituitary lesion was initially dx upon workup of low testosterone when he was living in [place], [place] at [facility] in 2016   He underwent surveillance imaging at 6 months and 1 year and it was reportedly stable in [place]  He moved to the [facility] in 2019 and underwent a repeat MRI for his yearly follow-up per his PCP   Was noted to have a new R cavernous meningioma and was evaluated by [facility] for this by a Dr. Montgomery.  At this appt told he had NO abnormality of the pituitary gland    Advised yearly follow-up in regard to this meningioma   Pt was briefly lost to follow-up with the COVID-19 pandemic but his PCP ordered MRI pituitary in dec 2023 for continued yearly follow-up   He was noted to have a slight increase in the meningioma and referred to  nsgy for evaluation   Today, offers no complaints. Denies any headaches, double vision, loss of vision, dizziness, weakness, numbness, seizures, AMS, LOC, etc.    Imagin2023 MRI brain pit w/wo: Meningioma along the right cavernous sinus, minimally increased in size compared to 2022. New signal alteration of the pituitary gland with central focus of differential/hypoenhancement which can be seen with gland heterogeneity or less likely a microadenoma. Mild chronic microangiopathic ischemic changes.    Plan:   Pt encouraged to continued to closely monitor neuro exam and sx   Pt educated on \"red flag\" s/sx to monitor for including HA, dizziness, vision changes/loss of peripheral vision/double vision, weakness, numbness, AMS, speech difficulty, seizure like activity, LOC, etc.  Imaging reviewed at length with the pt and Dr. Norton   Small, non-operative, likely benign lesions "   No acute neurosurgical intervention indicated at this time   Recommend ongoing surveillance given slight increase in size to this R cavernous meningioma   Will plan for repeat MRI brain pituitary w/wo in 1 year   Will follow-up with pt again upon completion of this MRI image  Appt to be scheduled as a joint appt with an AP and a brain surgeon in the practice   Pt agreeable to the above noted plan   All questions answered at this time   Pt encouraged to call the office with any further questions or concerns or should they experience any worsening sx      Pituitary microadenoma (HCC)  Pituitary Microadenoma   - asymptomatic     Imaging:   See above    Plan:   Recommend formal visual fields testing per ophthalmologist   Pt follow regularly with eye doctor, held on referral to  ophtho   Pt will obtain next month at this next appt   Will continue to closely monitor this likely benign, incidental lesion with yearly contrast MRI pituitary imaging.        Diagnoses and all orders for this visit:    Pituitary microadenoma (HCC)  -     MRI brain pituitary wo and w contrast; Future  -     BUN/Creatinine Ratio; Future    Meningioma (HCC)  -     Ambulatory Referral to Neurosurgery  -     MRI brain pituitary wo and w contrast; Future  -     BUN/Creatinine Ratio; Future          I have spent a total time of 45 minutes on 01/16/24 in caring for this patient including Diagnostic results, Prognosis, Risks and benefits of tx options, Instructions for management, Patient and family education, Importance of tx compliance, Risk factor reductions, Impressions, Counseling / Coordination of care, Documenting in the medical record, Reviewing / ordering tests, medicine, procedures  , Obtaining or reviewing history  , and Communicating with other healthcare professionals .    CHIEF COMPLAINT  Chief Complaint   Patient presents with    Consult     HISTORY  History of Present Illness     Pt is a 74yo M with a PMH significant for CAD s/p 4  vessel CABG in 2018 on daily ASA 81mg qd, HTN, HLD, DMII w/ A1c 7.3%, diabetic neuropathy, CKD3, and gout who presents to the  nsgy office today as a new pt referred by his PCP for continued follow-up in regard to an known meningioma and pituitary macroadenoma.     Patient states he was initially diagnosed with a pituitary adenoma in 2016 when he was living in [place], [place] at [facility].  Patient states he follows endocrinologist and was noted to have low testosterone so an MRI pituitary gland was ordered for further workup and revealed this finding in 2016.  Patient followed up with surveillance imaging at 6 months and then again at 1 year from initial diagnosis while living in [place], [place].  He moved to the [facility] in 2019 and establish care with a PCP in the area.  He underwent an MRI pituitary study with and without contrast of the brain in 2019 for ongoing yearly surveillance in regards to this pituitary adenoma finding.  This MRI revealed the new finding for a right cavernous sinus likely meningioma.  The patient was referred to [place] neurosurgery for further follow-up and management.  Patient was seen in [place] by Dr. Montgomery in October 2021 and told at this appointment that he did not have a pituitary adenoma, and that they would continue close surveillance of this newly identified meningioma.  Patient continued yearly imaging but unfortunately was lost to follow-up due to the COVID-19 pandemic.  Patient saw his PCP who ordered an MRI pituitary with and without contrast in December 2023 for ongoing surveillance.  This MRI revealed a concern for slight increase of this right cavernous meningioma as well as a pituitary microadenoma.  Patient was referred to [facility] neurosurgery for evaluation of this finding.    Today, the patient offers no complaints.  States he was having some left eye blurred vision but this resolved with some eyedrops given to him by his ophthalmologist.  He  continues to follow closely with ophthalmology in regard to cataracts and an abnormal lens.  Patient denies any headaches, double vision, loss of peripheral vision, weakness, numbness, altered mental status, speech difficulty, seizures, LOC, etc.      See Discussion    REVIEW OF SYSTEMS  Review of Systems   HENT:  Positive for tinnitus (bilateral).    Eyes:  Positive for visual disturbance (cloudy vision at night with driving).        Follows with ophthalmology   Cardiovascular:         Hx of bypass sx   Gastrointestinal: Negative.    Musculoskeletal:  Negative for arthralgias, gait problem, myalgias, neck pain and neck stiffness.   Skin:  Negative for rash and wound.   Neurological:  Negative for dizziness, tremors, seizures, syncope, facial asymmetry, speech difficulty, weakness, light-headedness, numbness and headaches.        Right handed   Hematological:  Bruises/bleeds easily (asa 81).   Psychiatric/Behavioral: Negative.  Negative for agitation, behavioral problems, confusion and decreased concentration. The patient is not nervous/anxious.      ROS obtained by MA. Reviewed. See HPI.       Meds/Allergies   Current Outpatient Medications   Medication Sig Dispense Refill    allopurinol (ZYLOPRIM) 300 mg tablet take 1 tablet by mouth once daily 90 tablet 3    amLODIPine-benazepril (LOTREL 5-20) 5-20 MG per capsule take 1 capsule by mouth once daily 90 capsule 3    aspirin 81 MG tablet Take 81 mg by mouth daily      Cholecalciferol (VITAMIN D3) 125 MCG (5000 UT) CAPS Take 5,000 Units by mouth daily      gabapentin (NEURONTIN) 300 mg capsule Take 1 capsule (300 mg total) by mouth daily AND 2 capsules (600 mg total) daily at bedtime. 270 capsule 3    hydrochlorothiazide (HYDRODIURIL) 25 mg tablet take 1 tablet by mouth once daily 90 tablet 3    metFORMIN (GLUCOPHAGE) 500 mg tablet take 2 tablets by mouth twice a day with meals 360 tablet 3    metoprolol tartrate (LOPRESSOR) 25 mg tablet Take 1 tablet (25 mg total) by  mouth every 12 (twelve) hours 180 tablet 0    rosuvastatin (CRESTOR) 20 MG tablet take 1 tablet by mouth once daily 90 tablet 3    semaglutide, 1 mg/dose, (Ozempic, 1 MG/DOSE,) 4 mg/3 mL injection pen Inject 0.75 mL (1 mg total) under the skin once a week 9 mL 3    Toujeo Max SoloStar 300 units/mL CONCENTRATED U-300 injection pen (2-unit dial) inject 26 units subcutaneously at bedtime 9 mL 5    vitamin B-12 (VITAMIN B-12) 1,000 mcg tablet Take 1,000 mcg by mouth daily       No current facility-administered medications for this visit.     Allergies   Allergen Reactions    Heparin Other (See Comments)     Heparin induced thrombocytopenia and thrombosis manifested by severe thrombocytopenia and bilateral lower extremity DVT     PAST HISTORY  Past Medical History:   Diagnosis Date    2019 novel coronavirus disease (COVID-19) 11/16/2020    Abdominal pain, epigastric 08/10/2020    [place] (acute kidney injury) (HCC) 12/07/2020    Allergic 05/2018    Coronary artery disease     COVID-19 virus infection 11/16/2020    Diabetes mellitus (HCC)     Gout     Podagra alternating toes. No gout attacks for 20 years after staring allopurinol    Heparin induced thrombocytopenia (HCC)     diagnosed at the time of the CABG, had bilateral lower extremity venous clots.     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  He has been following with an ophthalmologist.  Apparently does not have any visual field defect.     Past Surgical History:   Procedure Laterality Date    CORONARY ARTERY BYPASS GRAFT  05/22/2018    [facility]. CABGx 4 (LIMA D1, radial artery to ramus intermedius, SVG to PDA, SVG to PLOM.    HERNIA REPAIR      KNEE ARTHROSCOPY W/ MENISCECTOMY Right     KNEE SURGERY      TONSILLECTOMY    "    Social History     Tobacco Use    Smoking status: Never    Smokeless tobacco: Never   Vaping Use    Vaping status: Never Used   Substance Use Topics    Alcohol use: Never    Drug use: Never     Family History   Problem Relation Age of Onset    Hypertension Mother     Hyperlipidemia Mother     Heart failure Mother     Macular degeneration Father     No Known Problems Sister     No Known Problems Sister     Dementia Paternal Grandmother     Arthritis Paternal Grandmother     Glaucoma Maternal Grandmother      Above history personally reviewed.       EXAM  Vitals:Blood pressure 140/84, pulse 78, temperature 98.1 °F (36.7 °C), temperature source Temporal, resp. rate 16, height 5' 9\" (1.753 m), weight 109 kg (240 lb), SpO2 94%.,Body mass index is 35.44 kg/m².     Physical Exam  Constitutional:       General: He is not in acute distress.     Appearance: Normal appearance. He is obese. He is not ill-appearing or toxic-appearing.      Comments: Pleasant, elderly male sitting up comfortably in the chair.  No acute distress.   HENT:      Head: Normocephalic and atraumatic.      Right Ear: External ear normal.      Left Ear: External ear normal.      Nose: Nose normal. No congestion or rhinorrhea.      Mouth/Throat:      Mouth: Mucous membranes are moist.   Eyes:      General: No scleral icterus.        Right eye: No discharge.         Left eye: No discharge.      Extraocular Movements: Extraocular movements intact.      Conjunctiva/sclera: Conjunctivae normal.      Pupils: Pupils are equal, round, and reactive to light.   Cardiovascular:      Rate and Rhythm: Normal rate.   Pulmonary:      Effort: Pulmonary effort is normal. No respiratory distress.      Comments: No respiratory distress on room air  Abdominal:      General: Abdomen is flat.   Musculoskeletal:         General: No deformity or signs of injury. Normal range of motion.      Cervical back: Normal range of motion and neck supple. No rigidity or tenderness.     "  Right lower leg: No edema.      Left lower leg: No edema.   Skin:     General: Skin is warm and dry.      Capillary Refill: Capillary refill takes less than 2 seconds.   Neurological:      General: No focal deficit present.      Mental Status: He is alert and oriented to person, place, and time. Mental status is at baseline.      Cranial Nerves: No cranial nerve deficit.      Sensory: No sensory deficit.      Motor: No weakness.      Coordination: Coordination normal.      Gait: Gait normal.      Comments: GCS 15   A&Ox3   Appropriately answering questions and following commands   No dysarthria or aphasia   No appreciated CN deficits   -Visual fields grossly intact per my exam  Strength 5/5 throughout to beatriz UE and beatriz LE   Sensation intact to light touch to beatriz UE and beatriz LE   No drift or ataxia appreciated beatriz   Reflexes 2+ throughout   Psychiatric:         Mood and Affect: Mood normal.         Behavior: Behavior normal.         Thought Content: Thought content normal.         Judgment: Judgment normal.         Neurologic Exam     Mental Status   Oriented to person, place, and time.     Cranial Nerves     CN III, IV, VI   Pupils are equal, round, and reactive to light.      MEDICAL DECISION MAKING  Imaging Studies:   MRI brain pituitary wo and w contrast  Result Date: 12/28/2023  Narrative: MRI BRAIN AND SELLA  WITH AND WITHOUT CONTRAST INDICATION:  D32.9: Benign neoplasm of meninges, unspecified COMPARISON: MRI brain and pituitary 9/22/2022, 10/5/2021, TECHNIQUE: Multiplanar, multisequence imaging of the brain and sella was performed before and after gadolinium administration. Targeted images of the sella were performed requiring additional time at acquisition and interpretation of approximately 25% IV Contrast:  11 mL of Gadobutrol injection (SINGLE-DOSE) IMAGE QUALITY:  Diagnostic. FINDINGS: BRAIN PARENCHYMA:  There is no  mass effect or midline shift.  Brainstem and cerebellum demonstrate normal signal. There  is no intracranial hemorrhage.  There is no evidence of acute infarction and diffusion imaging is unremarkable. Small scattered hyperintensities on T2/FLAIR imaging are noted in the periventricular and subcortical white matter demonstrating an appearance that is statistically most likely to represent mild microangiopathic change. There is no pathologic intra-axial enhancement. VENTRICLES:  Ventricles and extra-axial CSF spaces are prominent commensurate with the degree of volume loss.  No hydrocephalus.  No intraventricular hemorrhage. SELLA AND PITUITARY GLAND:  The gland is normal in size. There is heterogeneity of the gland on the sagittal postcontrast sequence, new since the prior examinations, where there is a central focus of differential/hypoenhancement. This can be seen with gland heterogeneity or less likely a microadenoma. The pituitary stalk is midline. Reidentified dural based, extra-axial lesion along the lateral aspect of the right cavernous sinus compatible with a meningioma measuring 2.2 x 0.7 x 1.8 cm (AP x ML x CC) (previous 1.6 x 0.6 x 1.7 cm). ORBITS:  Normal. PARANASAL SINUSES:  Normal. VASCULATURE:  Evaluation of the major intracranial vasculature demonstrates appropriate flow voids. CALVARIUM AND SKULL BASE: Marrow signal heterogeneity which can be seen with underlying red marrow proliferation. EXTRACRANIAL SOFT TISSUES:  Normal.     Impression: Meningioma along the right cavernous sinus, minimally increased in size compared to 9/22/2022. New signal alteration of the pituitary gland with central focus of differential/hypoenhancement which can be seen with gland heterogeneity or less likely a microadenoma. Mild chronic microangiopathic ischemic changes. Workstation performed: [id number]     I have personally reviewed pertinent reports.   and I have personally reviewed pertinent films in PACS

## 2024-01-31 DIAGNOSIS — I25.810 CORONARY ARTERY DISEASE INVOLVING CORONARY BYPASS GRAFT OF NATIVE HEART WITHOUT ANGINA PECTORIS: ICD-10-CM

## 2024-02-06 DIAGNOSIS — Z79.4 CONTROLLED TYPE 2 DIABETES MELLITUS WITH DIABETIC NEUROPATHY, WITH LONG-TERM CURRENT USE OF INSULIN (HCC): ICD-10-CM

## 2024-02-06 DIAGNOSIS — E11.40 CONTROLLED TYPE 2 DIABETES MELLITUS WITH DIABETIC NEUROPATHY, WITH LONG-TERM CURRENT USE OF INSULIN (HCC): ICD-10-CM

## 2024-02-07 RX ORDER — GABAPENTIN 300 MG/1
CAPSULE ORAL
Qty: 270 CAPSULE | Refills: 3 | Status: SHIPPED | OUTPATIENT
Start: 2024-02-07

## 2024-03-15 ENCOUNTER — APPOINTMENT (OUTPATIENT)
Dept: LAB | Facility: CLINIC | Age: 74
End: 2024-03-15
Payer: MEDICARE

## 2024-03-19 ENCOUNTER — OFFICE VISIT (OUTPATIENT)
Dept: FAMILY MEDICINE CLINIC | Facility: CLINIC | Age: 74
End: 2024-03-19
Payer: MEDICARE

## 2024-03-19 VITALS
TEMPERATURE: 97.1 F | OXYGEN SATURATION: 98 % | HEART RATE: 72 BPM | SYSTOLIC BLOOD PRESSURE: 120 MMHG | WEIGHT: 232.6 LBS | DIASTOLIC BLOOD PRESSURE: 78 MMHG | BODY MASS INDEX: 34.45 KG/M2 | HEIGHT: 69 IN

## 2024-03-19 DIAGNOSIS — E66.01 CLASS 2 SEVERE OBESITY DUE TO EXCESS CALORIES WITH SERIOUS COMORBIDITY AND BODY MASS INDEX (BMI) OF 37.0 TO 37.9 IN ADULT (HCC): ICD-10-CM

## 2024-03-19 DIAGNOSIS — D35.2 PITUITARY MICROADENOMA (HCC): ICD-10-CM

## 2024-03-19 DIAGNOSIS — Z79.4 CONTROLLED TYPE 2 DIABETES MELLITUS WITH DIABETIC NEUROPATHY, WITH LONG-TERM CURRENT USE OF INSULIN (HCC): ICD-10-CM

## 2024-03-19 DIAGNOSIS — I10 ESSENTIAL HYPERTENSION: ICD-10-CM

## 2024-03-19 DIAGNOSIS — N18.31 STAGE 3A CHRONIC KIDNEY DISEASE (HCC): ICD-10-CM

## 2024-03-19 DIAGNOSIS — D32.9 MENINGIOMA (HCC): ICD-10-CM

## 2024-03-19 DIAGNOSIS — I25.810 CORONARY ARTERY DISEASE INVOLVING CORONARY BYPASS GRAFT OF NATIVE HEART WITHOUT ANGINA PECTORIS: Primary | ICD-10-CM

## 2024-03-19 DIAGNOSIS — E78.2 HYPERLIPEMIA, MIXED: ICD-10-CM

## 2024-03-19 DIAGNOSIS — E11.40 CONTROLLED TYPE 2 DIABETES MELLITUS WITH DIABETIC NEUROPATHY, WITH LONG-TERM CURRENT USE OF INSULIN (HCC): ICD-10-CM

## 2024-03-19 PROCEDURE — 99214 OFFICE O/P EST MOD 30 MIN: CPT | Performed by: INTERNAL MEDICINE

## 2024-03-19 PROCEDURE — G2211 COMPLEX E/M VISIT ADD ON: HCPCS | Performed by: INTERNAL MEDICINE

## 2024-03-19 RX ORDER — SEMAGLUTIDE 0.68 MG/ML
0.25 INJECTION, SOLUTION SUBCUTANEOUS
COMMUNITY
Start: 2024-03-01 | End: 2024-03-19

## 2024-03-19 NOTE — PROGRESS NOTES
Name: Kavon Calix      : 1950      MRN: [id number]  Encounter Provider: Yeyo Castaneda MD  Encounter Date: 3/19/2024   Encounter department: [facility] PRIMARY CARE    Assessment & Plan     Assessment & Plan  1. Diabetes mellitus type 2 well-controlled on insulin.  His hemoglobin A1c has dropped significantly. His kidney function is back to his baseline. He will continue Ozempic 1 mg weekly, Toujeo 26 units at bedtime, and metformin 1000 mg twice a day. He will let me know if he starts having a lot of sugars under 80, and we will probably adjust his Toujeo dose.    2. Pituitary microadenoma.  We will continue to follow the meningioma. He will get another MRI in a year. Continue neurosurgery follow up.    3. Obesity BMI 34.  He has lost about 5 pounds. Continue Ozempic at 1 mg weekly. We always have the option to increase the Ozempic to 2.4.    4. Essential Hypertension.  His blood pressure is great on the amlodipine, benazepril combination, hydrochlorothiazide, and metoprolol.    5. ASCAD native heart s/p CABG  He does not have any angina. We will continue to modify his risk factors for heart disease, which are high blood pressure, cholesterol, and diabetes.    6.  Meningioma  Minimal increase in size on his December MRI.  Continue follow-up with neurosurgery.  Follow-up  The patient will follow up in 3 months.         Subjective      History of Present Illness  The patient presents for evaluation of multiple medical concerns.    He is taking Ozempic 1 mg a week for his diabetes and is tolerating it well. He denies any nausea, bloating, or bowel habit changes. He checks his blood sugars in the mornings and they are anywhere from 100 to 110. He denies any hypoglycemic episodes and the lowest he had was 89. He thinks he is still losing weight slowly. He does not weigh himself at home. He was on Victoza in the past. He is still taking Toujeo 26 units at bedtime.    He saw the neurosurgeon,   "Cierra, who recommended another MRI in about 1 year. He acknowledged that the meningioma was slightly bigger, but it was not of a sufficient size to intervene on. The neurosurgeon still thinks he has a very small microadenoma in the pituitary gland, but he did not feel it was anything that he needed to do anything about it. Dr. Norton also wants him to have non-fasting blood work done in 01/2025.    He denies any chest pain, pressure on his chest, or severe shortness of breath.        Objective     /78 (BP Location: Left arm, Patient Position: Sitting, Cuff Size: Extra-Large)   Pulse 72   Temp (!) 97.1 °F (36.2 °C)   Ht 5' 9\" (1.753 m)   Wt 106 kg (232 lb 9.6 oz)   SpO2 98%   BMI 34.35 kg/m²     Wt Readings from Last 3 Encounters:   03/19/24 106 kg (232 lb 9.6 oz)   01/16/24 109 kg (240 lb)   12/19/23 108 kg (237 lb 9.6 oz)      Physical Exam    Physical Exam  Constitutional:       General: He is not in acute distress.     Appearance: Normal appearance.   Neck:      Vascular: No hepatojugular reflux or JVD.   Cardiovascular:      Rate and Rhythm: Normal rate and regular rhythm.      Heart sounds: No murmur heard.     No friction rub. No gallop.   Pulmonary:      Effort: Pulmonary effort is normal.      Breath sounds: Normal breath sounds.   Abdominal:      General: Abdomen is flat. There is no distension.      Palpations: Abdomen is soft. There is no mass.      Tenderness: There is no abdominal tenderness.   Musculoskeletal:         General: No swelling.   Neurological:      Mental Status: He is alert.           "

## 2024-04-08 DIAGNOSIS — E11.40 CONTROLLED TYPE 2 DIABETES MELLITUS WITH DIABETIC NEUROPATHY, WITH LONG-TERM CURRENT USE OF INSULIN (HCC): ICD-10-CM

## 2024-04-08 DIAGNOSIS — Z79.4 CONTROLLED TYPE 2 DIABETES MELLITUS WITH DIABETIC NEUROPATHY, WITH LONG-TERM CURRENT USE OF INSULIN (HCC): ICD-10-CM

## 2024-04-09 ENCOUNTER — TELEPHONE (OUTPATIENT)
Age: 74
End: 2024-04-09

## 2024-04-09 NOTE — TELEPHONE ENCOUNTER
PA for (Ozempic, 1 MG/DOSE,) 4 mg/3 mL  not required     Reason- (screenshot if applicable)              Message sent to provider pool No

## 2024-04-09 NOTE — TELEPHONE ENCOUNTER
PA for (Ozempic, 1 MG/DOSE,) 4 mg/3 mL     Submitted via    [x]CMM-KEY [id number]  []SurescriSuperfeedr-Case ID #   []Faxed to plan   []Other website   []Phone call Case ID #     Office notes sent, clinical questions answered. Awaiting determination    Turnaround time for your insurance to make a decision on your Prior Authorization can take 7-21 business days.

## 2024-04-15 DIAGNOSIS — E11.40 CONTROLLED TYPE 2 DIABETES MELLITUS WITH DIABETIC NEUROPATHY, WITH LONG-TERM CURRENT USE OF INSULIN (HCC): ICD-10-CM

## 2024-04-15 DIAGNOSIS — Z79.4 CONTROLLED TYPE 2 DIABETES MELLITUS WITH DIABETIC NEUROPATHY, WITH LONG-TERM CURRENT USE OF INSULIN (HCC): ICD-10-CM

## 2024-04-16 NOTE — TELEPHONE ENCOUNTER
Patient calling asking why Ozempic was denied. He has been on it since last fall. Please call and advise [phone]

## 2024-04-18 NOTE — TELEPHONE ENCOUNTER
Patient called to check status on why his ozempic had been denied. Please advise. Patient would like a return phone call as soon as possible.

## 2024-06-21 ENCOUNTER — RA CDI HCC (OUTPATIENT)
Dept: OTHER | Facility: HOSPITAL | Age: 74
End: 2024-06-21

## 2024-06-27 ENCOUNTER — APPOINTMENT (OUTPATIENT)
Dept: LAB | Facility: CLINIC | Age: 74
End: 2024-06-27
Payer: MEDICARE

## 2024-06-27 DIAGNOSIS — Z79.4 CONTROLLED TYPE 2 DIABETES MELLITUS WITH DIABETIC NEUROPATHY, WITH LONG-TERM CURRENT USE OF INSULIN (HCC): ICD-10-CM

## 2024-06-27 DIAGNOSIS — E11.40 CONTROLLED TYPE 2 DIABETES MELLITUS WITH DIABETIC NEUROPATHY, WITH LONG-TERM CURRENT USE OF INSULIN (HCC): ICD-10-CM

## 2024-06-27 LAB
ANION GAP SERPL CALCULATED.3IONS-SCNC: 8 MMOL/L (ref 4–13)
BUN SERPL-MCNC: 22 MG/DL (ref 5–25)
CALCIUM SERPL-MCNC: 9.5 MG/DL (ref 8.4–10.2)
CHLORIDE SERPL-SCNC: 102 MMOL/L (ref 96–108)
CHOLEST SERPL-MCNC: 99 MG/DL
CO2 SERPL-SCNC: 29 MMOL/L (ref 21–32)
CREAT SERPL-MCNC: 1.47 MG/DL (ref 0.6–1.3)
EST. AVERAGE GLUCOSE BLD GHB EST-MCNC: 131 MG/DL
GFR SERPL CREATININE-BSD FRML MDRD: 46 ML/MIN/1.73SQ M
GLUCOSE P FAST SERPL-MCNC: 85 MG/DL (ref 65–99)
HBA1C MFR BLD: 6.2 %
HDLC SERPL-MCNC: 39 MG/DL
LDLC SERPL CALC-MCNC: 27 MG/DL (ref 0–100)
POTASSIUM SERPL-SCNC: 4.1 MMOL/L (ref 3.5–5.3)
SODIUM SERPL-SCNC: 139 MMOL/L (ref 135–147)
TRIGL SERPL-MCNC: 167 MG/DL

## 2024-06-27 PROCEDURE — 36415 COLL VENOUS BLD VENIPUNCTURE: CPT

## 2024-06-27 PROCEDURE — 80061 LIPID PANEL: CPT

## 2024-06-27 PROCEDURE — 83036 HEMOGLOBIN GLYCOSYLATED A1C: CPT

## 2024-06-27 PROCEDURE — 80048 BASIC METABOLIC PNL TOTAL CA: CPT

## 2024-07-02 ENCOUNTER — OFFICE VISIT (OUTPATIENT)
Dept: FAMILY MEDICINE CLINIC | Facility: CLINIC | Age: 74
End: 2024-07-02
Payer: MEDICARE

## 2024-07-02 VITALS
DIASTOLIC BLOOD PRESSURE: 76 MMHG | HEART RATE: 69 BPM | SYSTOLIC BLOOD PRESSURE: 116 MMHG | WEIGHT: 231.8 LBS | OXYGEN SATURATION: 94 % | HEIGHT: 69 IN | BODY MASS INDEX: 34.33 KG/M2

## 2024-07-02 DIAGNOSIS — N18.30 CONTROLLED TYPE 2 DIABETES MELLITUS WITH STAGE 3 CHRONIC KIDNEY DISEASE, WITH LONG-TERM CURRENT USE OF INSULIN (HCC): Primary | ICD-10-CM

## 2024-07-02 DIAGNOSIS — Z79.4 CONTROLLED TYPE 2 DIABETES MELLITUS WITH DIABETIC NEUROPATHY, WITH LONG-TERM CURRENT USE OF INSULIN (HCC): ICD-10-CM

## 2024-07-02 DIAGNOSIS — E11.40 CONTROLLED TYPE 2 DIABETES MELLITUS WITH DIABETIC NEUROPATHY, WITH LONG-TERM CURRENT USE OF INSULIN (HCC): ICD-10-CM

## 2024-07-02 DIAGNOSIS — E78.2 HYPERLIPEMIA, MIXED: ICD-10-CM

## 2024-07-02 DIAGNOSIS — Z79.4 CONTROLLED TYPE 2 DIABETES MELLITUS WITH STAGE 3 CHRONIC KIDNEY DISEASE, WITH LONG-TERM CURRENT USE OF INSULIN (HCC): Primary | ICD-10-CM

## 2024-07-02 DIAGNOSIS — E66.01 CLASS 2 SEVERE OBESITY DUE TO EXCESS CALORIES WITH SERIOUS COMORBIDITY AND BODY MASS INDEX (BMI) OF 37.0 TO 37.9 IN ADULT (HCC): ICD-10-CM

## 2024-07-02 DIAGNOSIS — E11.22 CONTROLLED TYPE 2 DIABETES MELLITUS WITH STAGE 3 CHRONIC KIDNEY DISEASE, WITH LONG-TERM CURRENT USE OF INSULIN (HCC): Primary | ICD-10-CM

## 2024-07-02 DIAGNOSIS — I10 ESSENTIAL HYPERTENSION: ICD-10-CM

## 2024-07-02 LAB
CREAT UR-MCNC: 109.3 MG/DL
MICROALBUMIN UR-MCNC: 70.2 MG/L
MICROALBUMIN/CREAT 24H UR: 64 MG/G CREATININE (ref 0–30)

## 2024-07-02 PROCEDURE — 99214 OFFICE O/P EST MOD 30 MIN: CPT | Performed by: INTERNAL MEDICINE

## 2024-07-02 PROCEDURE — G2211 COMPLEX E/M VISIT ADD ON: HCPCS | Performed by: INTERNAL MEDICINE

## 2024-07-02 PROCEDURE — 82043 UR ALBUMIN QUANTITATIVE: CPT | Performed by: INTERNAL MEDICINE

## 2024-07-02 PROCEDURE — 82570 ASSAY OF URINE CREATININE: CPT | Performed by: INTERNAL MEDICINE

## 2024-07-02 NOTE — PROGRESS NOTES
Ambulatory Visit  Name: Kavon Calix      : 1950      MRN: [id number]  Encounter Provider: Yeyo Castaneda MD  Encounter Date: 2024   Encounter department: [facility] PRIMARY CARE    Assessment & Plan  Controlled type 2 diabetes mellitus with stage 3 chronic kidney disease, with long-term current use of insulin ([facility])    Lab Results   Component Value Date    HGBA1C 6.2 (H) 2024   Excellent diabetic control on current regimen. Renal function is stable.  Essential hypertension  His blood pressure was normal on repeat.  Continue Lotrel and hydrochlorothiazide  Hyperlipemia, mixed  Excellent LDL on rosuvastatin  Class 2 severe obesity due to excess calories with serious comorbidity and body mass index (BMI) of 37.0 to 37.9 in adult ([facility])  Great response to semaglutide.   Controlled type 2 diabetes mellitus with diabetic neuropathy, with long-term current use of insulin ([facility])    Lab Results   Component Value Date    HGBA1C 6.2 (H) 2024                 History of Present Illness       History of Present Illness  The patient is a 74-year-old male who presents for a follow-up visit.    The patient is scheduled for eye surgery in  [place], [place]. The initial procedure involves scraping his corneas in both eyes, followed by cataract surgery in approximately 3 to 4 months. His ophthalmologist is Dr. Braulio Reddy.    The patient reports a perceived plateau in his weight, despite being on Ozempic 1 mg weekly. He denies experiencing nausea, vomiting, diarrhea, or constipation with the Ozempic 1 mg dosage. He monitors his blood glucose levels at home, which typically range between 100 and 110 in the morning, and between 125 and 130 at night. His current medication regimen includes metformin 500 mg, two tablets twice daily, and Toujeo 26 units at bedtime.    The patient denies experiencing chest pain or pressure. He engages in strenuous activities such as ascending three flights of  "stairs at work without experiencing chest pain or shortness of breath. He also denies experiencing shortness of breath that impedes his ability to complete three flights of stairs. His respiratory function is unaffected, and he is able to lie flat in bed without experiencing shortness of breath. He occasionally walks a mile, but his physical activity is limited due to his work schedule. He is able to carry groceries from the car without difficulty.  He is also able to walk about 1 mile without any chest pain or shortness of breath.    Supplemental Information  He had bypass surgery years ago. He has seen [facility] neurosurgeons about the meningioma and they are going to follow him for now.        Objective     /76 (BP Location: Right arm, Cuff Size: Large)   Pulse 69   Ht 5' 9\" (1.753 m)   Wt 105 kg (231 lb 12.8 oz)   SpO2 94%   BMI 34.23 kg/m²     Wt Readings from Last 3 Encounters:   07/02/24 105 kg (231 lb 12.8 oz)   03/19/24 106 kg (232 lb 9.6 oz)   01/16/24 109 kg (240 lb)      Physical Exam    Physical Exam  Constitutional:       General: He is not in acute distress.     Appearance: Normal appearance. He is well-developed. He is not ill-appearing.   Neck:      Vascular: No JVD.   Cardiovascular:      Rate and Rhythm: Normal rate and regular rhythm.      Heart sounds: Normal heart sounds. No murmur heard.     No friction rub. No gallop.   Pulmonary:      Breath sounds: Normal breath sounds.   Abdominal:      General: Bowel sounds are normal. There is no distension.      Palpations: Abdomen is soft. There is no mass.   Musculoskeletal:         General: No swelling.   Neurological:      Mental Status: He is alert.       Administrative Statements       "

## 2024-07-02 NOTE — ASSESSMENT & PLAN NOTE
Lab Results   Component Value Date    HGBA1C 6.2 (H) 06/27/2024   Excellent diabetic control on current regimen. Renal function is stable.

## 2024-07-13 DIAGNOSIS — I25.810 CORONARY ARTERY DISEASE INVOLVING CORONARY BYPASS GRAFT OF NATIVE HEART WITHOUT ANGINA PECTORIS: ICD-10-CM

## 2024-07-13 DIAGNOSIS — I10 ESSENTIAL HYPERTENSION: ICD-10-CM

## 2024-07-13 RX ORDER — AMLODIPINE BESYLATE AND BENAZEPRIL HYDROCHLORIDE 5; 20 MG/1; MG/1
1 CAPSULE ORAL DAILY
Qty: 100 CAPSULE | Refills: 1 | Status: SHIPPED | OUTPATIENT
Start: 2024-07-13

## 2024-07-30 DIAGNOSIS — E78.2 HYPERLIPEMIA, MIXED: ICD-10-CM

## 2024-07-30 RX ORDER — ROSUVASTATIN CALCIUM 20 MG/1
20 TABLET, COATED ORAL DAILY
Qty: 100 TABLET | Refills: 1 | Status: SHIPPED | OUTPATIENT
Start: 2024-07-30

## 2024-09-02 DIAGNOSIS — E11.40 CONTROLLED TYPE 2 DIABETES MELLITUS WITH DIABETIC NEUROPATHY, WITH LONG-TERM CURRENT USE OF INSULIN (HCC): ICD-10-CM

## 2024-09-02 DIAGNOSIS — Z79.4 CONTROLLED TYPE 2 DIABETES MELLITUS WITH DIABETIC NEUROPATHY, WITH LONG-TERM CURRENT USE OF INSULIN (HCC): ICD-10-CM

## 2024-09-03 RX ORDER — INSULIN GLARGINE 300 U/ML
INJECTION, SOLUTION SUBCUTANEOUS
Qty: 30 ML | Refills: 0 | Status: SHIPPED | OUTPATIENT
Start: 2024-09-03

## 2024-09-30 ENCOUNTER — OFFICE VISIT (OUTPATIENT)
Dept: URGENT CARE | Facility: CLINIC | Age: 74
End: 2024-09-30
Payer: MEDICARE

## 2024-09-30 VITALS
HEART RATE: 80 BPM | TEMPERATURE: 97.2 F | DIASTOLIC BLOOD PRESSURE: 78 MMHG | OXYGEN SATURATION: 96 % | BODY MASS INDEX: 33.12 KG/M2 | WEIGHT: 223.6 LBS | RESPIRATION RATE: 24 BRPM | HEIGHT: 69 IN | SYSTOLIC BLOOD PRESSURE: 136 MMHG

## 2024-09-30 DIAGNOSIS — U07.1 COVID: Primary | ICD-10-CM

## 2024-09-30 DIAGNOSIS — R05.9 COUGH, UNSPECIFIED TYPE: ICD-10-CM

## 2024-09-30 LAB
SARS-COV-2 AG UPPER RESP QL IA: POSITIVE
VALID CONTROL: ABNORMAL

## 2024-09-30 PROCEDURE — 99213 OFFICE O/P EST LOW 20 MIN: CPT

## 2024-09-30 PROCEDURE — 87811 SARS-COV-2 COVID19 W/OPTIC: CPT

## 2024-09-30 PROCEDURE — G0463 HOSPITAL OUTPT CLINIC VISIT: HCPCS

## 2024-09-30 RX ORDER — FLUTICASONE PROPIONATE 50 MCG
1 SPRAY, SUSPENSION (ML) NASAL DAILY
Qty: 9.9 ML | Refills: 0 | Status: SHIPPED | OUTPATIENT
Start: 2024-09-30

## 2024-09-30 RX ORDER — BENZONATATE 200 MG/1
200 CAPSULE ORAL 3 TIMES DAILY PRN
Qty: 20 CAPSULE | Refills: 0 | Status: SHIPPED | OUTPATIENT
Start: 2024-09-30

## 2024-09-30 NOTE — PROGRESS NOTES
[facility]        NAME: Kavon Calix is a 74 y.o. male  : 1950    MRN: [id number]  DATE: 2024  TIME: 10:09 AM    Assessment and Plan   COVID [U07.1]  1. COVID  fluticasone (FLONASE) 50 mcg/act nasal spray    benzonatate (TESSALON) 200 MG capsule      2. Cough, unspecified type  Poct Covid 19 Rapid Antigen Test      Covid+. Recommended supportive care. Discussed Paxlovid not indicated as recent GFR <60. Discussed that he should discuss with his PCP if he would like a prescription. Symptoms are currently mild and lung exam WNL. Discussed ED precautions. Patient agreeable.  Patient Instructions     Decongestants recommended for congestion. No signs of bacterial infection today. Follow up with PCP in 3-5 days if no improvement. Proceed to ER if symptoms worsen.    Chief Complaint     Chief Complaint   Patient presents with    Cold Like Symptoms     Sore throat, cough, sinus congestion, chest congestion, and fatigue starting Saturday.      History of Present Illness     Kavon Calix is a 74 y.o. male presenting to the office today for upper respiratory complaints.   Symptoms have been present for 2 days, and include cough, sore throat, congestion, fatigue.   He has tried Tylenol for his symptoms, some relief with headache.  Sick contacts include: none    Review of Systems     Review of Systems   Constitutional:  Positive for fatigue. Negative for chills and fever.   HENT:  Positive for congestion and sore throat.    Respiratory:  Positive for cough. Negative for shortness of breath, wheezing and stridor.    Gastrointestinal: Negative.  Negative for nausea and vomiting.   Genitourinary: Negative.    Musculoskeletal: Negative.  Negative for myalgias.   Skin: Negative.    Neurological: Negative.        Current Medications       Current Outpatient Medications:     allopurinol (ZYLOPRIM) 300 mg tablet, take 1 tablet by mouth once daily, Disp: 90 tablet, Rfl: 3    amLODIPine-benazepril  (LOTREL 5-20) 5-20 MG per capsule, TAKE ONE CAPSULE BY MOUTH DAILY, Disp: 100 capsule, Rfl: 1    aspirin 81 MG tablet, Take 81 mg by mouth daily, Disp: , Rfl:     benzonatate (TESSALON) 200 MG capsule, Take 1 capsule (200 mg total) by mouth 3 (three) times a day as needed for cough, Disp: 20 capsule, Rfl: 0    Cholecalciferol (VITAMIN D3) 125 MCG (5000 UT) CAPS, Take 5,000 Units by mouth daily, Disp: , Rfl:     fluticasone (FLONASE) 50 mcg/act nasal spray, 1 spray into each nostril daily, Disp: 9.9 mL, Rfl: 0    gabapentin (NEURONTIN) 300 mg capsule, Take 1 capsule (300 mg total) by mouth daily AND 2 capsules (600 mg total) daily at bedtime., Disp: 270 capsule, Rfl: 3    hydrochlorothiazide (HYDRODIURIL) 25 mg tablet, take 1 tablet by mouth once daily, Disp: 90 tablet, Rfl: 3    metFORMIN (GLUCOPHAGE) 500 mg tablet, take 2 tablets by mouth twice a day with meals, Disp: 360 tablet, Rfl: 3    metoprolol tartrate (LOPRESSOR) 25 mg tablet, TAKE ONE TABLET BY MOUTH EVERY 12 HOURS, Disp: 200 tablet, Rfl: 1    Propylene Glycol (SYSTANE COMPLETE OP), Apply to eye, Disp: , Rfl:     rosuvastatin (CRESTOR) 20 MG tablet, TAKE ONE TABLET BY MOUTH DAILY, Disp: 100 tablet, Rfl: 1    semaglutide, 2 mg/dose, (Ozempic) 8 mg/ mL injection pen, Inject 0.75 mL (2 mg total) under the skin every 7 days, Disp: 3 mL, Rfl: 5    Toujeo Max SoloStar 300 units/mL CONCENTRATED U-300 injection pen (2-unit dial), INJECT 26 UNITS SUBCUTANEOUSLY AT BEDTIME, Disp: 30 mL, Rfl: 0    vitamin B-12 (VITAMIN B-12) 1,000 mcg tablet, Take 1,000 mcg by mouth daily, Disp: , Rfl:     Current Allergies     Allergies as of 09/30/2024 - Reviewed 09/30/2024   Allergen Reaction Noted    Heparin Other (See Comments) 09/24/2019            The following portions of the patient's history were reviewed and updated as appropriate: allergies, current medications, past family history, past medical history, past social history, past surgical history and problem list.  "    Past Medical History:   Diagnosis Date    2019 novel coronavirus disease (COVID-19) 11/16/2020    Abdominal pain, epigastric 08/10/2020    [place] (acute kidney injury) (HCC) 12/07/2020    Allergic 05/2018    Coronary artery disease     COVID-19 virus infection 11/16/2020    Diabetes mellitus (HCC)     Gout     Podagra alternating toes. No gout attacks for 20 years after staring allopurinol    Heparin induced thrombocytopenia (HCC)     diagnosed at the time of the CABG, had bilateral lower extremity venous clots.     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  He has been following with an ophthalmologist.  Apparently does not have any visual field defect.       Past Surgical History:   Procedure Laterality Date    CORONARY ARTERY BYPASS GRAFT  05/22/2018    [facility]. CABGx 4 (LIMA D1, radial artery to ramus intermedius, SVG to PDA, SVG to PLOM.    HERNIA REPAIR      KNEE ARTHROSCOPY W/ MENISCECTOMY Right     KNEE SURGERY      TONSILLECTOMY         Family History   Problem Relation Age of Onset    Hypertension Mother     Hyperlipidemia Mother     Heart failure Mother     Macular degeneration Father     No Known Problems Sister     No Known Problems Sister     Dementia Paternal Grandmother     Arthritis Paternal Grandmother     Glaucoma Maternal Grandmother        Medications have been verified.    Objective     /78   Pulse 80   Temp (!) 97.2 °F (36.2 °C)   Resp (!) 24   Ht 5' 9\" (1.753 m)   Wt 101 kg (223 lb 9.6 oz)   SpO2 96%   BMI 33.02 kg/m²   No LMP for male patient.     Physical Exam     Physical Exam  Vitals and nursing note reviewed.   Constitutional:       General: He is not in acute distress.     Appearance: Normal appearance. He is well-developed and normal " weight. He is not ill-appearing, toxic-appearing or diaphoretic.   HENT:      Head: Normocephalic and atraumatic.      Right Ear: Tympanic membrane, ear canal and external ear normal. No drainage, swelling or tenderness. No middle ear effusion. There is no impacted cerumen. Tympanic membrane is not erythematous.      Left Ear: Tympanic membrane, ear canal and external ear normal. No drainage, swelling or tenderness.  No middle ear effusion. There is no impacted cerumen. Tympanic membrane is not erythematous.      Nose: Congestion and rhinorrhea present.      Mouth/Throat:      Mouth: Mucous membranes are moist. No oral lesions.      Pharynx: Uvula midline. Posterior oropharyngeal erythema present. No pharyngeal swelling, oropharyngeal exudate or uvula swelling.      Tonsils: No tonsillar exudate or tonsillar abscesses.   Eyes:      General: No scleral icterus.        Right eye: No discharge.         Left eye: No discharge.      Conjunctiva/sclera: Conjunctivae normal.   Neck:      Thyroid: No thyromegaly.   Cardiovascular:      Rate and Rhythm: Normal rate and regular rhythm.      Heart sounds: Normal heart sounds. No murmur heard.     No friction rub. No gallop.   Pulmonary:      Effort: Pulmonary effort is normal. No respiratory distress.      Breath sounds: Normal breath sounds. No stridor. No wheezing, rhonchi or rales.   Chest:      Chest wall: No tenderness.   Musculoskeletal:         General: Normal range of motion.      Cervical back: Normal range of motion and neck supple.   Lymphadenopathy:      Cervical: No cervical adenopathy.   Skin:     General: Skin is warm and dry.      Capillary Refill: Capillary refill takes less than 2 seconds.   Neurological:      General: No focal deficit present.      Mental Status: He is alert and oriented to person, place, and time.   Psychiatric:         Mood and Affect: Mood normal.         Behavior: Behavior normal.

## 2024-09-30 NOTE — PATIENT INSTRUCTIONS
"Patient Education     COVID-19 in adults - Discharge instructions   The Basics   Written by the doctors and editors at [facility]   What are discharge instructions? -- Discharge instructions are information about how to take care of yourself after getting medical care for a health problem.  What is COVID-19? -- COVID-19 stands for \"coronavirus disease 2019.\" It is caused by a virus called SARS-CoV-2.  The virus that causes COVID-19 mainly spreads from person to person. This usually happens when an infected person coughs, sneezes, or talks near other people. A person can be infected, and spread the virus to others, even without having any symptoms.  How do I care for myself at home? -- Ask the doctor or nurse what you should do when you go home. Make sure that you understand exactly what you need to do to care for yourself. Ask questions if there is anything you do not understand.  You can also:   Follow all instructions for taking your medicines, if your doctor prescribed any.   Drink plenty of fluids, such as water, juice, or broth. This helps replace fluids lost from a fever.   Take acetaminophen (sample brand name: Tylenol) to help reduce a fever. If this does not help, you can try medicines like ibuprofen (sample brand names: Advil, Motrin).   Use a cool mist humidifier. This might make it easier to breathe.   Lower the chance of passing the infection to others:   Stay home while you are feeling sick or have a fever.   At home, try to limit close contact with other people. You can also help protect others by wearing a face mask.   Wash your hands often (figure 1).   Cover your mouth and nose with the inside of your elbow when you cough or sneeze.   Do not go to work or school until your symptoms are improving and your fever has been gone for at least 24 hours without taking medicine such as acetaminophen.  If you have not already been vaccinated, consider getting a COVID-19 vaccine as soon as you have recovered. " Being vaccinated is the best way to protect yourself and others.  When should I call the doctor? -- Call for an ambulance (in the [place] and [place], call 9-1-1) if:   You are having so much trouble breathing that you cannot speak a full sentence.   You are very confused or cannot stay awake.   Your lips or skin start to turn blue.   You think that you might be having a medical emergency - Examples include severe chest pain, feeling extremely weak or like you might pass out, or losing control of your body (like being unable to speak normally or move your arm or leg).  Call your doctor if:   You develop new shortness of breath, or your breathing gets worse (but you can still talk in full sentences).   You become weak or dizzy.   You have very dark urine or do not urinate for more than 8 hours.   You have new or worsening symptoms that concern you - COVID-19 symptoms can include fever, cough, feeling very tired, shaking, chills, headache, and trouble swallowing. They can also include digestive problems like vomiting or diarrhea.  All topics are updated as new evidence becomes available and our peer review process is complete.  This topic retrieved from [employer] on: Mar 13, 2024.  Topic 270700 Version 2.0  Release: 32.2.4 - C32.71  © 2024 UpToDate, Inc. and/or its affiliates. All rights reserved.  figure 1: How to wash your hands     Wet your hands with clean water, and apply a small amount of soap. Lather and rub hands together for at least 20 seconds. Clean your wrists, palms, backs of your hands, between your fingers, tips of your fingers, thumbs, and under and around your nails. Rinse well, and dry your hands using a clean towel.  Graphic 000125 Version 7.0  Consumer Information Use and Disclaimer   Disclaimer: This generalized information is a limited summary of diagnosis, treatment, and/or medication information. It is not meant to be comprehensive and should be used as a tool to help the user understand and/or assess  potential diagnostic and treatment options. It does NOT include all information about conditions, treatments, medications, side effects, or risks that may apply to a specific patient. It is not intended to be medical advice or a substitute for the medical advice, diagnosis, or treatment of a health care provider based on the health care provider's examination and assessment of a patient's specific and unique circumstances. Patients must speak with a health care provider for complete information about their health, medical questions, and treatment options, including any risks or benefits regarding use of medications. This information does not endorse any treatments or medications as safe, effective, or approved for treating a specific patient. UpToDate, Inc. and its affiliates disclaim any warranty or liability relating to this information or the use thereof.The use of this information is governed by the Terms of Use, available at https://www.[employer].com/en/know/clinical-effectiveness-terms. 2024© UpToDate, Inc. and its affiliates and/or licensors. All rights reserved.  Copyright   © 2024 UpToDate, Inc. and/or its affiliates. All rights reserved.

## 2024-10-01 ENCOUNTER — PATIENT MESSAGE (OUTPATIENT)
Dept: FAMILY MEDICINE CLINIC | Facility: CLINIC | Age: 74
End: 2024-10-01

## 2024-10-01 DIAGNOSIS — U07.1 COVID-19: Primary | ICD-10-CM

## 2024-10-01 RX ORDER — NIRMATRELVIR AND RITONAVIR 150-100 MG
2 KIT ORAL 2 TIMES DAILY
Qty: 20 TABLET | Refills: 0 | Status: SHIPPED | OUTPATIENT
Start: 2024-10-01 | End: 2024-10-06

## 2024-10-10 LAB
LEFT EYE DIABETIC RETINOPATHY: NORMAL
LEFT EYE DIABETIC RETINOPATHY: NORMAL
RIGHT EYE DIABETIC RETINOPATHY: NORMAL
RIGHT EYE DIABETIC RETINOPATHY: NORMAL

## 2024-10-17 ENCOUNTER — APPOINTMENT (OUTPATIENT)
Dept: LAB | Facility: CLINIC | Age: 74
End: 2024-10-17
Payer: MEDICARE

## 2024-10-17 DIAGNOSIS — E11.22 CONTROLLED TYPE 2 DIABETES MELLITUS WITH STAGE 3 CHRONIC KIDNEY DISEASE, WITH LONG-TERM CURRENT USE OF INSULIN (HCC): ICD-10-CM

## 2024-10-17 DIAGNOSIS — N18.30 CONTROLLED TYPE 2 DIABETES MELLITUS WITH STAGE 3 CHRONIC KIDNEY DISEASE, WITH LONG-TERM CURRENT USE OF INSULIN (HCC): ICD-10-CM

## 2024-10-17 DIAGNOSIS — Z79.4 CONTROLLED TYPE 2 DIABETES MELLITUS WITH STAGE 3 CHRONIC KIDNEY DISEASE, WITH LONG-TERM CURRENT USE OF INSULIN (HCC): ICD-10-CM

## 2024-10-17 LAB
ANION GAP SERPL CALCULATED.3IONS-SCNC: 9 MMOL/L (ref 4–13)
BUN SERPL-MCNC: 17 MG/DL (ref 5–25)
CALCIUM SERPL-MCNC: 8.9 MG/DL (ref 8.4–10.2)
CHLORIDE SERPL-SCNC: 104 MMOL/L (ref 96–108)
CHOLEST SERPL-MCNC: 102 MG/DL
CO2 SERPL-SCNC: 27 MMOL/L (ref 21–32)
CREAT SERPL-MCNC: 1.15 MG/DL (ref 0.6–1.3)
EST. AVERAGE GLUCOSE BLD GHB EST-MCNC: 126 MG/DL
GFR SERPL CREATININE-BSD FRML MDRD: 63 ML/MIN/1.73SQ M
GLUCOSE P FAST SERPL-MCNC: 97 MG/DL (ref 65–99)
HBA1C MFR BLD: 6 %
HDLC SERPL-MCNC: 42 MG/DL
LDLC SERPL CALC-MCNC: 22 MG/DL (ref 0–100)
NONHDLC SERPL-MCNC: 60 MG/DL
POTASSIUM SERPL-SCNC: 4.8 MMOL/L (ref 3.5–5.3)
SODIUM SERPL-SCNC: 140 MMOL/L (ref 135–147)
TRIGL SERPL-MCNC: 189 MG/DL

## 2024-10-17 PROCEDURE — 36415 COLL VENOUS BLD VENIPUNCTURE: CPT

## 2024-10-17 PROCEDURE — 80061 LIPID PANEL: CPT

## 2024-10-17 PROCEDURE — 83036 HEMOGLOBIN GLYCOSYLATED A1C: CPT

## 2024-10-17 PROCEDURE — 80048 BASIC METABOLIC PNL TOTAL CA: CPT

## 2024-10-22 ENCOUNTER — OFFICE VISIT (OUTPATIENT)
Dept: FAMILY MEDICINE CLINIC | Facility: CLINIC | Age: 74
End: 2024-10-22
Payer: MEDICARE

## 2024-10-22 VITALS
HEART RATE: 76 BPM | SYSTOLIC BLOOD PRESSURE: 124 MMHG | HEIGHT: 69 IN | WEIGHT: 224.8 LBS | DIASTOLIC BLOOD PRESSURE: 68 MMHG | BODY MASS INDEX: 33.3 KG/M2 | OXYGEN SATURATION: 98 %

## 2024-10-22 DIAGNOSIS — E78.2 HYPERLIPEMIA, MIXED: ICD-10-CM

## 2024-10-22 DIAGNOSIS — Z23 ENCOUNTER FOR IMMUNIZATION: Primary | ICD-10-CM

## 2024-10-22 DIAGNOSIS — E66.812 CLASS 2 SEVERE OBESITY DUE TO EXCESS CALORIES WITH SERIOUS COMORBIDITY AND BODY MASS INDEX (BMI) OF 37.0 TO 37.9 IN ADULT (HCC): ICD-10-CM

## 2024-10-22 DIAGNOSIS — Z12.5 ENCOUNTER FOR PROSTATE CANCER SCREENING: ICD-10-CM

## 2024-10-22 DIAGNOSIS — I25.810 CORONARY ARTERY DISEASE INVOLVING CORONARY BYPASS GRAFT OF NATIVE HEART WITHOUT ANGINA PECTORIS: ICD-10-CM

## 2024-10-22 DIAGNOSIS — E66.01 CLASS 2 SEVERE OBESITY DUE TO EXCESS CALORIES WITH SERIOUS COMORBIDITY AND BODY MASS INDEX (BMI) OF 37.0 TO 37.9 IN ADULT (HCC): ICD-10-CM

## 2024-10-22 DIAGNOSIS — I10 ESSENTIAL HYPERTENSION: ICD-10-CM

## 2024-10-22 DIAGNOSIS — N18.31 STAGE 3A CHRONIC KIDNEY DISEASE (HCC): ICD-10-CM

## 2024-10-22 DIAGNOSIS — D32.9 MENINGIOMA (HCC): ICD-10-CM

## 2024-10-22 PROCEDURE — G0439 PPPS, SUBSEQ VISIT: HCPCS | Performed by: INTERNAL MEDICINE

## 2024-10-22 PROCEDURE — 99214 OFFICE O/P EST MOD 30 MIN: CPT | Performed by: INTERNAL MEDICINE

## 2024-10-22 PROCEDURE — G0008 ADMIN INFLUENZA VIRUS VAC: HCPCS | Performed by: INTERNAL MEDICINE

## 2024-10-22 PROCEDURE — 90662 IIV NO PRSV INCREASED AG IM: CPT | Performed by: INTERNAL MEDICINE

## 2024-10-22 NOTE — ASSESSMENT & PLAN NOTE
Lab Results   Component Value Date    EGFR 63 10/17/2024    EGFR 46 06/27/2024    EGFR 46 03/15/2024    CREATININE 1.15 10/17/2024    CREATININE 1.47 (H) 06/27/2024    CREATININE 1.49 (H) 03/15/2024

## 2024-10-22 NOTE — PROGRESS NOTES
Ambulatory Visit  Name: Kavon Calix      : 1950      MRN: [id number]  Encounter Provider: Yeyo Castaneda MD  Encounter Date: 10/22/2024   Encounter department: [facility]    Assessment & Plan  1. Type 2 Diabetes Mellitus.  His A1c has decreased from 6.2 to 6.0, indicating improved glycemic control. He is currently on Toujeo 26 units at bedtime, metformin 500 mg 2 tablets twice a day, and Ozempic 2 mg every week. He is advised to report if his fasting blood sugar consistently falls around 70, at which point his insulin dosage may be reduced by 5 units. He is also advised to notify if he experiences blood sugar levels under 80 consistently or a single reading of 50.    2.  Obesity class II.  His weight has decreased from 231 lbs in 2024 to 224 lbs today. Continued monitoring of weight and blood sugar levels is recommended.  Continue Ozempic.    3. Chronic Kidney Disease stage IIIa  Kidney function has shown improvement, with creatinine dropping to 1.15 and GFR increasing from 46 to 63. No changes in current management are needed.    4. Hyperlipidemia.  Cholesterol levels are within normal range, with total cholesterol at 102 and LDL at 22. He is currently on rosuvastatin 20 mg daily. This medication will be continued for cholesterol management.    5.  Essential hypertension.  Blood pressure is well-controlled. He is currently taking amlodipine with benazepril, hydrochlorothiazide, and metoprolol. No changes in current management are needed.    6. Coronary Artery Disease.  He is not experiencing any symptoms. Current medications will be continued.    7. Meningioma.  He will follow up with neurosurgery in 2025 and will have another MRI at that time.    8. Joint Pain.  He reports joint pain managed occasionally with Tylenol. No new treatment is required as the pain has not worsened.    9. Health Maintenance.  He will receive his influenza vaccine today. He is advised to  "get the COVID-19 booster after 3 months. A prostate cancer blood test will be conducted with his next round of blood tests.    Follow-up  Return in 3 months for follow-up.            History of Present Illness       History of Present Illness  The patient is a 74-year-old male who presents for a regular follow-up visit.    He reports satisfactory management of his diabetes, with morning fasting blood sugar levels typically ranging between 92 and 105. His current medication regimen includes Toujeo 26 units at bedtime, metformin 500 mg (2 tablets twice daily), and Ozempic 2 mg weekly. He has not experienced any hypoglycemic episodes in the mornings.    He has been experiencing weight loss and reports no chest pain or pressure. He has not yet received his influenza vaccine. He contracted COVID-19 in 09/2024 and has had the infection three times in total.    He experiences joint pain, which he manages with Tylenol. He reports no issues with his memory.    He has an appointment with neurosurgery in 01/2025 and will have another MRI at that time.        Objective     /68 (BP Location: Right arm, Patient Position: Sitting, Cuff Size: Large)   Pulse 76   Ht 5' 9\" (1.753 m)   Wt 102 kg (224 lb 12.8 oz)   SpO2 98%   BMI 33.20 kg/m²     Wt Readings from Last 3 Encounters:   10/22/24 102 kg (224 lb 12.8 oz)   09/30/24 101 kg (223 lb 9.6 oz)   07/02/24 105 kg (231 lb 12.8 oz)        Physical Exam  Constitutional:       General: He is not in acute distress.     Appearance: Normal appearance. He is well-developed. He is not ill-appearing.   Neck:      Vascular: No JVD.   Cardiovascular:      Rate and Rhythm: Normal rate and regular rhythm.      Pulses: no weak pulses.           Dorsalis pedis pulses are 2+ on the right side and 2+ on the left side.        Posterior tibial pulses are 2+ on the right side and 2+ on the left side.      Heart sounds: Normal heart sounds. No murmur heard.     No friction rub. No gallop. "   Pulmonary:      Breath sounds: Normal breath sounds.   Abdominal:      General: Bowel sounds are normal. There is no distension.      Palpations: Abdomen is soft. There is no mass.   Musculoskeletal:         General: No swelling.   Feet:      Right foot:      Skin integrity: No ulcer, skin breakdown, erythema, warmth, callus or dry skin.      Left foot:      Skin integrity: No ulcer, skin breakdown, erythema, warmth, callus or dry skin.   Neurological:      Mental Status: He is alert.       Administrative Statements               Diabetic Foot Exam    Patient's shoes and socks removed.    Right Foot/Ankle   Right Foot Inspection  Skin Exam: skin normal and skin intact. No dry skin, no warmth, no callus, no erythema, no maceration, no abnormal color, no pre-ulcer, no ulcer and no callus.     Toe Exam: ROM and strength within normal limits.     Sensory   Monofilament testing: intact    Vascular  The right DP pulse is 2+. The right PT pulse is 2+.     Left Foot/Ankle  Left Foot Inspection  Skin Exam: skin normal and skin intact. No dry skin, no warmth, no erythema, no maceration, normal color, no pre-ulcer, no ulcer and no callus.     Toe Exam: ROM and strength within normal limits.     Sensory   Monofilament testing: intact    Vascular  The left DP pulse is 2+. The left PT pulse is 2+.     Assign Risk Category  No deformity present  No loss of protective sensation  No weak pulses  Risk: 0        Health Risk Assessment:   Patient rates overall health as good. Patient feels that their physical health rating is slightly better. Patient is satisfied with their life. Eyesight was rated as slightly worse. Hearing was rated as same. Patient feels that their emotional and mental health rating is same. Patients states they are never, rarely angry. Patient states they are sometimes unusually tired/fatigued. Pain experienced in the last 7 days has been some. Patient's pain rating has been 2/10. Patient states that he has  experienced no weight loss or gain in last 6 months. Generalized joint aches. Occasionally takes Tylenol. Nothing new.    Fall Risk Screening:   In the past year, patient has experienced: no history of falling in past year      Home Safety:  Patient does not have trouble with stairs inside or outside of their home. Patient has working smoke alarms and has working carbon monoxide detector. Home safety hazards include: none.     Nutrition:   Current diet is Regular.     Medications:   Patient is currently taking over-the-counter supplements. OTC medications include: see medication list. Patient is able to manage medications.     Activities of Daily Living (ADLs)/Instrumental Activities of Daily Living (IADLs):   Walk and transfer into and out of bed and chair?: Yes  Dress and groom yourself?: Yes    Bathe or shower yourself?: Yes    Feed yourself? Yes  Do your laundry/housekeeping?: Yes  Manage your money, pay your bills and track your expenses?: Yes  Make your own meals?: Yes    Do your own shopping?: Yes    Previous Hospitalizations:   Any hospitalizations or ED visits within the last 12 months?: No      Advance Care Planning:   Living will: No    Durable POA for healthcare: No    Advanced directive: No    Advanced directive counseling given: Yes    End of Life Decisions reviewed with patient: Yes    Provider agrees with end of life decisions: Yes      Cognitive Screening:   Provider or family/friend/caregiver concerned regarding cognition?: No    PREVENTIVE SCREENINGS      Cardiovascular Screening:    General: Screening Not Indicated and History Lipid Disorder      Diabetes Screening:     General: Screening Not Indicated and History Diabetes      Colorectal Cancer Screening:     General: Screening Current      Prostate Cancer Screening:      Due for: PSA      Osteoporosis Screening:    General: Screening Not Indicated      Abdominal Aortic Aneurysm (AAA) Screening:    Risk factors include: age between 65-74 yo     "    General: Screening Not Indicated      Lung Cancer Screening:     General: Screening Not Indicated      Hepatitis C Screening:    General: Screening Current    Screening, Brief Intervention, and Referral to Treatment (SBIRT)    Screening  Typical number of drinks in a day: 0  Typical number of drinks in a week: 0  Interpretation: Low risk drinking behavior.    AUDIT-C Screenin) How often did you have a drink containing alcohol in the past year? monthly or less  2) How many drinks did you have on a typical day when you were drinking in the past year? 0  3) How often did you have 6 or more drinks on one occasion in the past year? never    AUDIT-C Score: 1  Interpretation: Score 0-3 (male): Negative screen for alcohol misuse    Single Item Drug Screening:  How often have you used an illegal drug (including marijuana) or a prescription medication for non-medical reasons in the past year? never    Single Item Drug Screen Score: 0  Interpretation: Negative screen for possible drug use disorder       Answers submitted by the patient for this visit:  Medicare Annual Wellness Visit (Submitted on 10/15/2024)  How would you rate your overall health?: good  Compared to last year, how is your physical health?: slightly better  In general, how satisfied are you with your life?: satisfied  Compared to last year, how is your eyesight?: slightly worse  Compared to last year, how is your hearing?: same  Compared to last year, how is your emotional/mental health?: same  How often is anger a problem for you?: never, rarely  How often do you feel unusually tired/fatigued?: sometimes  In the past 7 days, how much pain have you experienced?: some  If you answered \"some\" or \"a lot\", please rate the severity of your pain on a scale of 1 to 10 (1 being the least severe pain and 10 being the most intense pain).: 2/10  In the past 6 months, have you lost or gained 10 pounds without trying?: No  One or more falls in the last year: " No  Do you have trouble with the stairs inside or outside your home?: No  Does your home have working smoke alarms?: Yes  Does your home have a carbon monoxide monitor?: Yes  Which safety hazards (if any) have you experienced in your home? Please select all that apply.: none  How would you describe your current diet? Please select all that apply.: Regular  In addition to prescription medications, are you taking any over-the-counter supplements?: Yes  If yes, what supplements are you taking?: Vitamins D and B  Can you manage your medications?: Yes  Are you currently taking any opioid medications?: No  Can you walk and transfer into and out of your bed and chair?: Yes  Can you dress and groom yourself?: Yes  Can you bathe or shower yourself?: Yes  Can you feed yourself?: Yes  Can you do your laundry/ housekeeping?: Yes  Can you manage your money, pay your bills, and track your expenses?: Yes  Can you make your own meals?: Yes  Can you do your own shopping?: Yes  Within the last 12 months, have you had any hospitalizations or Emergency Department visits?: No  Do you have a living will?: No  Do you have a Durable POA (Power of ) for healthcare decisions?: No  Do you have an Advanced Directive for end of life decisions?: No  How often have you used an illegal drug (including marijuana) or a prescription medication for non-medical reasons in the past year?: never  What is the typical number of drinks you consume in a day?: 0  What is the typical number of drinks you consume in a week?: 0  How often did you have a drink containing alcohol in the past year?: monthly or less  How many drinks did you have on a typical day  when you were drinking in the past year?: 0  How often did you have 6 or more drinks on one occasion in the past year?: never

## 2024-10-22 NOTE — PROGRESS NOTES
Ambulatory Visit  Name: Kavon Calix      : 1950      MRN: [id number]  Encounter Provider: Yeyo Castaneda MD  Encounter Date: 10/22/2024   [facility]    Assessment & Plan       Preventive health issues were discussed with patient, and age appropriate screening tests were ordered as noted in patient's After Visit Summary. Personalized health advice and appropriate referrals for health education or preventive services given if needed, as noted in patient's After Visit Summary.    History of Present Illness     HPI   Patient Care Team:  Yeyo Castaneda MD as PCP - General (Internal Medicine)    Review of Systems  Medical History Reviewed by provider this encounter:       Annual Wellness Visit Questionnaire       Health Risk Assessment:   Patient rates overall health as good. Patient feels that their physical health rating is slightly better. Patient is satisfied with their life. Eyesight was rated as slightly worse. Hearing was rated as same. Patient feels that their emotional and mental health rating is same. Patients states they are never, rarely angry. Patient states they are sometimes unusually tired/fatigued. Pain experienced in the last 7 days has been some. Patient's pain rating has been 2/10. Patient states that he has experienced no weight loss or gain in last 6 months.     Fall Risk Screening:   In the past year, patient has experienced: no history of falling in past year      Home Safety:  Patient does not have trouble with stairs inside or outside of their home. Patient has working smoke alarms and has working carbon monoxide detector. Home safety hazards include: none.     Nutrition:   Current diet is Regular.     Medications:   Patient is currently taking over-the-counter supplements. OTC medications include: see medication list. Patient is able to manage medications.     Activities of Daily Living (ADLs)/Instrumental Activities of Daily Living  (IADLs):   Walk and transfer into and out of bed and chair?: Yes  Dress and groom yourself?: Yes    Bathe or shower yourself?: Yes    Feed yourself? Yes  Do your laundry/housekeeping?: Yes  Manage your money, pay your bills and track your expenses?: Yes  Make your own meals?: Yes    Do your own shopping?: Yes    Previous Hospitalizations:   Any hospitalizations or ED visits within the last 12 months?: No      Advance Care Planning:   Living will: No    Durable POA for healthcare: No    Advanced directive: No      PREVENTIVE SCREENINGS      Cardiovascular Screening:    General: Screening Not Indicated and History Lipid Disorder      Diabetes Screening:     General: Screening Not Indicated and History Diabetes      Colorectal Cancer Screening:     General: Screening Current      Abdominal Aortic Aneurysm (AAA) Screening:    Risk factors include: age between 65-74 yo        Lung Cancer Screening:     General: Screening Not Indicated      Hepatitis C Screening:    General: Screening Current    Screening, Brief Intervention, and Referral to Treatment (SBIRT)    Screening  Typical number of drinks in a day: 0  Typical number of drinks in a week: 0  Interpretation: Low risk drinking behavior.    AUDIT-C Screenin) How often did you have a drink containing alcohol in the past year? monthly or less  2) How many drinks did you have on a typical day when you were drinking in the past year? 0  3) How often did you have 6 or more drinks on one occasion in the past year? never    AUDIT-C Score: 1  Interpretation: Score 0-3 (male): Negative screen for alcohol misuse    Single Item Drug Screening:  How often have you used an illegal drug (including marijuana) or a prescription medication for non-medical reasons in the past year? never    Single Item Drug Screen Score: 0  Interpretation: Negative screen for possible drug use disorder    Social Determinants of Health     Financial Resource Strain: Low Risk  (2023)    Overall  Financial Resource Strain (CARDIA)     Difficulty of Paying Living Expenses: Not very hard   Food Insecurity: No Food Insecurity (10/15/2024)    Nursing - Inadequate Food Risk Classification     Worried About Running Out of Food in the Last Year: Never true     Ran Out of Food in the Last Year: Never true   Transportation Needs: No Transportation Needs (10/15/2024)    PRAPARE - Transportation     Lack of Transportation (Medical): No     Lack of Transportation (Non-Medical): No   Housing Stability: Low Risk  (10/15/2024)    Housing Stability Vital Sign     Unable to Pay for Housing in the Last Year: No     Number of Times Moved in the Last Year: 0     Homeless in the Last Year: No   Utilities: Not At Risk (10/15/2024)    [facility] Utilities     Threatened with loss of utilities: No     No results found.    Objective     There were no vitals taken for this visit.    Physical Exam

## 2024-10-30 DIAGNOSIS — Z79.4 CONTROLLED TYPE 2 DIABETES MELLITUS WITH DIABETIC NEUROPATHY, WITH LONG-TERM CURRENT USE OF INSULIN (HCC): ICD-10-CM

## 2024-10-30 DIAGNOSIS — E11.40 CONTROLLED TYPE 2 DIABETES MELLITUS WITH DIABETIC NEUROPATHY, WITH LONG-TERM CURRENT USE OF INSULIN (HCC): ICD-10-CM

## 2024-12-02 ENCOUNTER — OFFICE VISIT (OUTPATIENT)
Dept: FAMILY MEDICINE CLINIC | Facility: CLINIC | Age: 74
End: 2024-12-02
Payer: MEDICARE

## 2024-12-02 VITALS
SYSTOLIC BLOOD PRESSURE: 124 MMHG | HEART RATE: 78 BPM | DIASTOLIC BLOOD PRESSURE: 64 MMHG | OXYGEN SATURATION: 98 % | WEIGHT: 219.4 LBS | HEIGHT: 69 IN | BODY MASS INDEX: 32.5 KG/M2

## 2024-12-02 DIAGNOSIS — Z01.818 PRE-OP EXAMINATION: Primary | ICD-10-CM

## 2024-12-02 PROCEDURE — 99213 OFFICE O/P EST LOW 20 MIN: CPT | Performed by: INTERNAL MEDICINE

## 2024-12-02 PROCEDURE — G2211 COMPLEX E/M VISIT ADD ON: HCPCS | Performed by: INTERNAL MEDICINE

## 2024-12-02 RX ORDER — KETOROLAC TROMETHAMINE 5 MG/ML
1 SOLUTION OPHTHALMIC
COMMUNITY
Start: 2024-11-04

## 2024-12-02 RX ORDER — PREDNISOLONE ACETATE 10 MG/ML
1 SUSPENSION/ DROPS OPHTHALMIC
COMMUNITY
Start: 2024-11-04

## 2024-12-02 RX ORDER — OFLOXACIN 3 MG/ML
1 SOLUTION/ DROPS OPHTHALMIC
COMMUNITY
Start: 2024-11-04

## 2024-12-02 NOTE — PROGRESS NOTES
Pre-operative Clearance  Name: Kavon Calix      : 1950      MRN: [id number]  Encounter Provider: Yeyo Castaneda MD  Encounter Date: 2024   Encounter department: [facility] CARE    Pre-operative Clearance:     Revised Cardiac Risk Index:  RCI RISK CLASS I (0 risk factors, risk of major cardiac complications approximately 0.5%)    Clearance:  Patient is medically optimized (CLEARED) for proposed surgery without any additional cardiac testing.      Medication Instructions:   - ACE Inhibitors or ARBs: Continue this medication up to the evening before surgery/procedure, but do not take the morning of the day of surgery.  - Antiepileptic meds: Continue to take this medication on your normal schedule.  - Beta blockers:  Continue to take this medication on your normal schedule.  - Calcium channel blockers: Continue to take this medication on your normal schedule.  - Diuretics: Continue taking this medication up to the evening before surgery/procedure, but do not take in the morning of the day of surgery/procedure.  - Gout meds: Continue to take this medication on your normal schedule.  - Hyperlipidemia meds: Continue to take this medication on your normal schedule.      Medicine Instructions for Adults with Diabetes (NO Bowel Prep)    Follow these instructions when a BOWEL PREP is NOT required for your procedure or surgery!    NOTE:  GLP Agonists taken weekly: do not take in the 7 days before your procedure. **Bariatric surgery: do not take 4 weeks prior to your procedure.    SGLT-2 Inhibitors: do not take in the 4 days before your procedure    On the Day Before Surgery/Procedure  If you are having a procedure (e.g., Colonoscopy) or surgery which DOES NOT require a bowel prep, follow the directions below based on the type of medicine you take for your diabetes.  Type of Medicine You Take Examples What to Do   Pre-Mixed Insulin Intermediate  Zpxjlok06/25, Moppuou89/30, Novolog 70/30,  Regular Insulin Take 1/2 your regular dose the evening before our procedure.   Rapid/Fast Acting  Insulin and/or Long-Acting Insulin Humalog U200, NovoLog, Apidra,  Lantus, Levemir, Tresiba, Toujeo,  Fias, Basaglar Take your FULL regular dose the day before procedure.   Oral Diabetic Medicines (sulfonylurea) Glipizide/Glimepiride/  Glucotrol Take your regular dose with dinner the evening before your procedure.   Other Oral Diabetic Medicines Metformin, Glucophage, Glucophage  XR, Riomet, Glumetza, Actose,  Avandia, Gl set, Prandin Take your regular dose with dinner the evening before your procedure   GLP Agonists Adlyxin, Byetta, Bydureon,  Ozempic, Soliqua, Tanzeum,  Trulicity, Victoza, Saxenda,  Rybelsus, Wegovy, Mounjaro, Zepbound If taken daily, take as normal  If taken weekly, do not take this medicine for 7 days before your procedure including the day of the procedure (resume taking after the procedure). **Bariatric surgery: do not take 4 weeks prior to procedure   SGLT-2 Inhibitors Jardiance, Invokana, Farxiga, Steglatro, Brenzavvy, Qtern, Segluromet Glyxambi, Synjardy, Synjardy XR, Invokamet, InvokametXR, Trijary XR, Xigduo X Do not take for 4 days before your procedure including the day of the procedure (resume taking after the procedure)   This educational material has been approved by the Patient Education Advisory Committee.    On the Day of Surgery/Procedure  Follow the directions below based on the type of medicine you take for your diabetes.  Type of Medicine You Take  Examples What to Do   Long-Acting Insulin Lantus, Levemir, Tresiba,  Toujeo, Basaglar, Semglee If you normally take your Long Acting Insulin in the morning, take the full dose as scheduled.   GLP-I Agonists Adlyxin, Byetta, Bydureon,  Ozempic, Soliqua, Tanzeum,  Trulicity, Victoza, Saxenda,  Rybelsus, Mounjaro Do NOT take this medicine on the day of your procedure (resume taking after the procedure)   Except for the morning Long-Acting  Insulin, DO NOT take ANY diabetic medicine on the day of your procedure unless you were instructed by the doctor who manages your diabetes medicines.  Continue to check your blood sugars.  If you have an insulin pump, ask your endocrinologist for instructions at least 3 days before your procedure. NOTE: If you are not able to ask your endocrinologist in advance, on the day of the procedure set your insulin pump to your basal rate only. Bring your insulin pump supplies to the hospital.         History of Present Illness       History of Present Illness  The patient is a 74-year-old male who presents for a preoperative assessment before cataract surgery.    He is scheduled for cataract surgery on his right eye on 12/18/2024, followed by the left eye on 12/30/2024. The procedure will be performed by Dr. Asher. He anticipates receiving an IV during his upcoming surgery, as he was asked about his tolerance for it. No specific recommendations were given regarding medication intake on the morning of the surgery. His next dose of Ozempic is due on 12/08/2024.    Since his last visit, he reports no changes in his health. His diabetes appears to be well-managed. He is able to climb three flights of stairs at work without experiencing chest pain, pressure, or severe shortness of breath. He has no difficulty lying flat in bed.    His most recent surgery was a bypass operation in [place] in 05/2018. An MRI is scheduled for 01/06/2025.     Review of Systems  Past Medical History   Past Medical History:   Diagnosis Date    2019 novel coronavirus disease (COVID-19) 11/16/2020    Abdominal pain, epigastric 08/10/2020    [place] (acute kidney injury) (HCC) 12/07/2020    Allergic 05/2018    Coronary artery disease     COVID-19 virus infection 11/16/2020    Diabetes mellitus (HCC)     Gout     Podagra alternating toes. No gout attacks for 20 years after staring allopurinol    Heparin induced thrombocytopenia (HCC)     diagnosed at the time  of the CABG, had bilateral lower extremity venous clots.     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  He has been following with an ophthalmologist.  Apparently does not have any visual field defect.     Past Surgical History:   Procedure Laterality Date    CORONARY ARTERY BYPASS GRAFT  05/22/2018    [facility]. CABGx 4 (LIMA D1, radial artery to ramus intermedius, SVG to PDA, SVG to PLOM.    HERNIA REPAIR      KNEE ARTHROSCOPY W/ MENISCECTOMY Right     KNEE SURGERY      TONSILLECTOMY       Family History   Problem Relation Age of Onset    Hypertension Mother     Hyperlipidemia Mother     Heart failure Mother     Macular degeneration Father     No Known Problems Sister     No Known Problems Sister     Dementia Paternal Grandmother     Arthritis Paternal Grandmother     Glaucoma Maternal Grandmother      Social History     Tobacco Use    Smoking status: Never     Passive exposure: Never    Smokeless tobacco: Never   Vaping Use    Vaping status: Never Used   Substance and Sexual Activity    Alcohol use: Not Currently    Drug use: Never    Sexual activity: Not Currently     Current Outpatient Medications on File Prior to Visit   Medication Sig    allopurinol (ZYLOPRIM) 300 mg tablet take 1 tablet by mouth once daily    amLODIPine-benazepril (LOTREL 5-20) 5-20 MG per capsule TAKE ONE CAPSULE BY MOUTH DAILY    aspirin 81 MG tablet Take 81 mg by mouth daily    Cholecalciferol (VITAMIN D3) 125 MCG (5000 UT) CAPS Take 5,000 Units by mouth daily    gabapentin (NEURONTIN) 300 mg capsule Take 1 capsule (300 mg total) by mouth daily AND 2 capsules (600 mg total) daily at bedtime.    hydrochlorothiazide (HYDRODIURIL) 25 mg tablet take 1 tablet by mouth once daily    ketorolac (ACULAR) 0.5 %  "ophthalmic solution 1 drop    metFORMIN (GLUCOPHAGE) 500 mg tablet TAKE TWO TABLETS BY MOUTH TWICE A DAY WITH MEALS    metoprolol tartrate (LOPRESSOR) 25 mg tablet TAKE ONE TABLET BY MOUTH EVERY 12 HOURS    ofloxacin (OCUFLOX) 0.3 % ophthalmic solution 1 drop    prednisoLONE acetate (PRED FORTE) 1 % ophthalmic suspension 1 drop    Propylene Glycol (SYSTANE COMPLETE OP) Apply to eye    rosuvastatin (CRESTOR) 20 MG tablet TAKE ONE TABLET BY MOUTH DAILY    semaglutide, 2 mg/dose, (Ozempic) 8 mg/ mL injection pen Inject 0.75 mL (2 mg total) under the skin every 7 days    Toujeo Max SoloStar 300 units/mL CONCENTRATED U-300 injection pen (2-unit dial) INJECT 26 UNITS SUBCUTANEOUSLY AT BEDTIME    vitamin B-12 (VITAMIN B-12) 1,000 mcg tablet Take 1,000 mcg by mouth daily     Allergies   Allergen Reactions    Heparin Other (See Comments)     Heparin induced thrombocytopenia and thrombosis manifested by severe thrombocytopenia and bilateral lower extremity DVT     Objective   /64 (BP Location: Left arm, Patient Position: Sitting, Cuff Size: Large)   Pulse 78   Ht 5' 9\" (1.753 m)   Wt 99.5 kg (219 lb 6.4 oz)   SpO2 98%   BMI 32.40 kg/m²     Physical Exam  Vitals reviewed.   Constitutional:       General: He is not in acute distress.     Appearance: Normal appearance. He is well-developed. He is not ill-appearing.   HENT:      Head: Normocephalic and atraumatic.      Right Ear: Tympanic membrane and external ear normal.      Left Ear: Tympanic membrane and external ear normal.      Nose: Nose normal.      Mouth/Throat:      Mouth: Mucous membranes are moist.      Pharynx: Oropharynx is clear.   Eyes:      General: No scleral icterus.     Comments: Dense bilateral cataracts   Neck:      Thyroid: No thyromegaly.      Vascular: No JVD.   Cardiovascular:      Rate and Rhythm: Normal rate and regular rhythm.      Heart sounds: Normal heart sounds. No murmur heard.     No friction rub. No gallop.   Pulmonary:      Breath " sounds: Normal breath sounds.   Abdominal:      General: Bowel sounds are normal. There is no distension.      Palpations: Abdomen is soft. There is no mass.   Musculoskeletal:         General: No swelling.   Neurological:      Mental Status: He is alert.   Psychiatric:         Mood and Affect: Mood normal.           Yeyo Castaneda MD

## 2024-12-02 NOTE — PATIENT INSTRUCTIONS
Pre-operative Medication Instructions    Avoid herbs or non-directed vitamins one week prior to surgery  May take tylenol for pain up until the night before surgery    ACE Inhibitors or ARBs       Medication Name     amLODIPine-benazepril (LOTREL 5-20) 5-20 MG per capsule        Continue this medication up to the evening before surgery/procedure, but do not take the morning of the day of surgery.    Seizure Medication       Medication Name     gabapentin (NEURONTIN) 300 mg capsule        Continue to take this medication on your normal schedule.  If this is an oral medication and you take it in the morning, then you may take this medicine with a sip of water.    Beta Blockers       Medication Name     metoprolol tartrate (LOPRESSOR) 25 mg tablet        Continue to take this heart medication on your normal schedule.  If this is an oral medication and you take it in the morning, then you may take this medicine with a sip of water.      Diuretics       Medication Name     hydrochlorothiazide (HYDRODIURIL) 25 mg tablet        Continue this medication up to the evening before surgery/procedure, but do not take the morning of the day of surgery.    Anti-Gout Meds       Medication Name     allopurinol (ZYLOPRIM) 300 mg tablet        Continue to take this medication on your normal schedule.  If this is an oral medication and you take it in the morning, then you may take this medicine with a sip of water.    Cholesterol lowering meds       Medication Name     rosuvastatin (CRESTOR) 20 MG tablet        Continue to take this medication on your normal schedule.  If this is an oral medication and you take it in the morning, then you may take this medicine with a sip of water.    Diabetic Medications       Medication Name     metFORMIN (GLUCOPHAGE) 500 mg tablet     semaglutide, 2 mg/dose, (Ozempic) 8 mg/ mL injection pen     Toujeo Max SoloStar 300 units/mL CONCENTRATED U-300 injection pen (2-unit dial)          Medicine  Instructions for Adults with Diabetes (NO Bowel Prep)    Follow these instructions when a BOWEL PREP is NOT required for your procedure or surgery!    NOTE:  GLP Agonists taken weekly: do not take in the 7 days before your procedure. **Bariatric surgery: do not take 4 weeks prior to your procedure.    SGLT-2 Inhibitors: do not take in the 4 days before your procedure    On the Day Before Surgery/Procedure  If you are having a procedure (e.g., Colonoscopy) or surgery which DOES NOT require a bowel prep, follow the directions below based on the type of medicine you take for your diabetes.  Type of Medicine You Take Examples What to Do   Pre-Mixed Insulin Intermediate  Ocaxnxc52/25, Wwlgqjx02/30, Novolog 70/30, Regular Insulin Take 1/2 your regular dose the evening before our procedure.   Rapid/Fast Acting  Insulin and/or Long-Acting Insulin Humalog U200, NovoLog, Apidra,  Lantus, Levemir, Tresiba, Toujeo,  Fias, Basaglar Take your FULL regular dose the day before procedure.   Oral Diabetic Medicines (sulfonylurea) Glipizide/Glimepiride/  Glucotrol Take your regular dose with dinner the evening before your procedure.   Other Oral Diabetic Medicines Metformin, Glucophage, Glucophage  XR, Riomet, Glumetza, Actose,  Avandia, Gl set, Prandin Take your regular dose with dinner the evening before your procedure   GLP Agonists Adlyxin, Byetta, Bydureon,  Ozempic, Soliqua, Tanzeum,  Trulicity, Victoza, Saxenda,  Rybelsus, Wegovy, Mounjaro, Zepbound If taken daily, take as normal  If taken weekly, do not take this medicine for 7 days before your procedure including the day of the procedure (resume taking after the procedure). **Bariatric surgery: do not take 4 weeks prior to procedure   SGLT-2 Inhibitors Jardiance, Invokana, Farxiga, Steglatro, Brenzavvy, Qtern, Segluromet Glyxambi, Synjardy, Synjardy XR, Invokamet, InvokametXR, Trijary XR, Xigduo X Do not take for 4 days before your procedure including the day of the procedure  (resume taking after the procedure)   This educational material has been approved by the Patient Education Advisory Committee.    On the Day of Surgery/Procedure  Follow the directions below based on the type of medicine you take for your diabetes.  Type of Medicine You Take  Examples What to Do   Long-Acting Insulin Lantus, Levemir, Tresiba,  Toujeo, Basaglar, Semglee If you normally take your Long Acting Insulin in the morning, take the full dose as scheduled.   GLP-I Agonists Adlyxin, Byetta, Bydureon,  Ozempic, Soliqua, Tanzeum,  Trulicity, Victoza, Saxenda,  Rybelsus, Mounjaro Do NOT take this medicine on the day of your procedure (resume taking after the procedure)   Except for the morning Long-Acting Insulin, DO NOT take ANY diabetic medicine on the day of your procedure unless you were instructed by the doctor who manages your diabetes medicines.  Continue to check your blood sugars.  If you have an insulin pump, ask your endocrinologist for instructions at least 3 days before your procedure. NOTE: If you are not able to ask your endocrinologist in advance, on the day of the procedure set your insulin pump to your basal rate only. Bring your insulin pump supplies to the hospital.    If you have any questions about taking your diabetes medicines prior to your procedure, please contact the doctor who manages your diabetes medicines.

## 2024-12-13 ENCOUNTER — TELEPHONE (OUTPATIENT)
Dept: ADMINISTRATIVE | Facility: OTHER | Age: 74
End: 2024-12-13

## 2024-12-13 NOTE — TELEPHONE ENCOUNTER
----- Message from Sunita JACOB sent at 12/13/2024 11:32 AM EST -----  Regarding: Care Gap request  12/13/24 11:32 AM    Hello, our patient attached above has had Diabetic Eye Exam completed/performed. Please assist in updating the patient chart by pulling the document from the Media Tab. The date of service is 2024  .     Thank you,  Sunita Bob  [facility] PRIMARY [facility]

## 2024-12-13 NOTE — TELEPHONE ENCOUNTER
Upon review of the In Basket request we have found/obtained the documentation. After careful review of the document we are unable to complete this request for Diabetic Eye Exam because the documentation does not have the result(s) needed to close the requested care gap(s).    Any additional questions or concerns should be emailed to the Practice Liaisons via the appropriate education email address, please do not reply via In Basket.    Thank you  Merced Justin MA   [facility]

## 2024-12-19 DIAGNOSIS — I10 ESSENTIAL HYPERTENSION: ICD-10-CM

## 2024-12-20 ENCOUNTER — TELEPHONE (OUTPATIENT)
Dept: ADMINISTRATIVE | Facility: OTHER | Age: 74
End: 2024-12-20

## 2024-12-20 RX ORDER — HYDROCHLOROTHIAZIDE 25 MG/1
25 TABLET ORAL DAILY
Qty: 90 TABLET | Refills: 1 | Status: SHIPPED | OUTPATIENT
Start: 2024-12-20

## 2024-12-20 NOTE — TELEPHONE ENCOUNTER
Upon review of the In Basket request we were able to locate, review, and update the patient chart as requested for Diabetic Eye Exam.    Any additional questions or concerns should be emailed to the Practice Liaisons via the appropriate education email address, please do not reply via In Basket.    Thank you  STARLA ARMSTRONG MA   [facility]

## 2024-12-20 NOTE — TELEPHONE ENCOUNTER
----- Message from Sunita JACOB sent at 12/19/2024  5:29 PM EST -----  Regarding: Care Gap request  12/19/24 5:29 PM    Hello, our patient attached above has had Diabetic Eye Exam completed/performed. Please assist in updating the patient chart by pulling the document from the Media Tab. The date of service is 10/10/2024  .     Thank you,  Sunita Bob  [facility] PRIMARY [facility]

## 2025-01-06 ENCOUNTER — HOSPITAL ENCOUNTER (OUTPATIENT)
Dept: MRI IMAGING | Facility: HOSPITAL | Age: 75
Discharge: HOME | End: 2025-01-06
Payer: MEDICARE

## 2025-01-06 DIAGNOSIS — N18.30 CONTROLLED TYPE 2 DIABETES MELLITUS WITH STAGE 3 CHRONIC KIDNEY DISEASE, WITH LONG-TERM CURRENT USE OF INSULIN (HCC): ICD-10-CM

## 2025-01-06 DIAGNOSIS — D35.2 PITUITARY MICROADENOMA (HCC): ICD-10-CM

## 2025-01-06 DIAGNOSIS — Z79.4 CONTROLLED TYPE 2 DIABETES MELLITUS WITH STAGE 3 CHRONIC KIDNEY DISEASE, WITH LONG-TERM CURRENT USE OF INSULIN (HCC): ICD-10-CM

## 2025-01-06 DIAGNOSIS — D32.9 MENINGIOMA (HCC): ICD-10-CM

## 2025-01-06 DIAGNOSIS — E11.22 CONTROLLED TYPE 2 DIABETES MELLITUS WITH STAGE 3 CHRONIC KIDNEY DISEASE, WITH LONG-TERM CURRENT USE OF INSULIN (HCC): ICD-10-CM

## 2025-01-06 PROCEDURE — 70553 MRI BRAIN STEM W/O & W/DYE: CPT

## 2025-01-06 PROCEDURE — G1004 CDSM NDSC: HCPCS

## 2025-01-06 PROCEDURE — A9585 GADOBUTROL INJECTION: HCPCS | Performed by: PHYSICIAN ASSISTANT

## 2025-01-06 RX ORDER — GADOBUTROL 604.72 MG/ML
10 INJECTION INTRAVENOUS
Status: COMPLETED | OUTPATIENT
Start: 2025-01-06 | End: 2025-01-06

## 2025-01-06 RX ADMIN — GADOBUTROL 10 ML: 604.72 INJECTION INTRAVENOUS at 14:05

## 2025-01-07 RX ORDER — SEMAGLUTIDE 2.68 MG/ML
INJECTION, SOLUTION SUBCUTANEOUS
Qty: 3 ML | Refills: 5 | Status: SHIPPED | OUTPATIENT
Start: 2025-01-07

## 2025-01-10 ENCOUNTER — APPOINTMENT (OUTPATIENT)
Dept: LAB | Facility: CLINIC | Age: 75
End: 2025-01-10
Payer: MEDICARE

## 2025-01-10 DIAGNOSIS — D35.2 PITUITARY MICROADENOMA (HCC): ICD-10-CM

## 2025-01-10 DIAGNOSIS — D32.9 MENINGIOMA (HCC): ICD-10-CM

## 2025-01-10 LAB
BUN SERPL-MCNC: 22 MG/DL (ref 5–25)
CREAT SERPL-MCNC: 1.36 MG/DL (ref 0.6–1.3)
GFR SERPL CREATININE-BSD FRML MDRD: 51 ML/MIN/1.73SQ M

## 2025-01-10 PROCEDURE — 84520 ASSAY OF UREA NITROGEN: CPT

## 2025-01-10 PROCEDURE — 36415 COLL VENOUS BLD VENIPUNCTURE: CPT

## 2025-01-10 PROCEDURE — 82565 ASSAY OF CREATININE: CPT

## 2025-01-11 DIAGNOSIS — I25.810 CORONARY ARTERY DISEASE INVOLVING CORONARY BYPASS GRAFT OF NATIVE HEART WITHOUT ANGINA PECTORIS: ICD-10-CM

## 2025-01-11 DIAGNOSIS — E11.40 CONTROLLED TYPE 2 DIABETES MELLITUS WITH DIABETIC NEUROPATHY, WITH LONG-TERM CURRENT USE OF INSULIN (HCC): ICD-10-CM

## 2025-01-11 DIAGNOSIS — Z79.4 CONTROLLED TYPE 2 DIABETES MELLITUS WITH DIABETIC NEUROPATHY, WITH LONG-TERM CURRENT USE OF INSULIN (HCC): ICD-10-CM

## 2025-01-13 RX ORDER — GABAPENTIN 300 MG/1
CAPSULE ORAL
Qty: 810 CAPSULE | Refills: 1 | Status: SHIPPED | OUTPATIENT
Start: 2025-01-13

## 2025-01-13 RX ORDER — METOPROLOL TARTRATE 25 MG/1
25 TABLET, FILM COATED ORAL EVERY 12 HOURS
Qty: 180 TABLET | Refills: 1 | Status: SHIPPED | OUTPATIENT
Start: 2025-01-13

## 2025-01-15 NOTE — ASSESSMENT & PLAN NOTE
1 year follow up of a right cavernous meningioma  Also with a pituitary lesion that was initially diagnosed on workup of low testosterone in 2016   He moved to the [facility] in 2019 and repeat MRI ordered by his PCP noted a new R cavernous meningioma for which he was evaluated by [facility] by Dr. Montgomery. Was told he had no abnormality of the pituitary gland but advised yearly follow-up in regard to this meningioma   He was briefly lost to follow-up because of the pandemic, but has been established with our office since 2023 and was seen in conjunction with Dr. Norton at that time.  Recovering from staged bilateral cataract surgery, no new complaints.  Exam: non-focal     Imaging:   MRI pituitary 1/6/25: 1.  Slightly increased size of right cavernous sinus meningioma. 2.  Redemonstrated pituitary gland heterogeneity. Central focus of relative hypoenhancement measuring 5 x 4 mm, grossly stable compared to prior from 12/21/2023. Differential considerations include physiologic gland heterogeneity or microadenoma. Correlation with laboratory parameters suggested. 3.  Chronic microangiopathic changes. No acute abnormality.    Plan:   Reviewed images with patient and compared to several prior scans. There is continued slight growth in the right cavernous meningioma.   Discussed natural history of meningiomas. They are generally asymptomatic, benign and slow growing.    Options for management are continued surveillance, surgical resection or SRS/SRT.   Given continued growth, recommend closer interval surveillance with follow up in 6 months with MRI cavernous to see Dr. Norton. Also recommend formal VF/OCT be completed prior to the appointment. Discussed that if there is continued growth, may need to consider potential intervention options such as SRS versus operative intervention.  Review red flag s/s.   Call sooner with any questions or concerns.       Orders:    BUN; Future    Creatinine, serum; Future    MRI brain  cavernous / trigeminal wo and w contrast; Future

## 2025-01-15 NOTE — ASSESSMENT & PLAN NOTE
Pituitary Microadenoma   Asymptomatic and stable on MRI.    Plan:   Will continue to closely monitor this likely benign, incidental lesion with yearly imaging.

## 2025-01-16 ENCOUNTER — OFFICE VISIT (OUTPATIENT)
Dept: NEUROSURGERY | Facility: CLINIC | Age: 75
End: 2025-01-16
Payer: MEDICARE

## 2025-01-16 VITALS
HEART RATE: 76 BPM | WEIGHT: 223 LBS | RESPIRATION RATE: 16 BRPM | TEMPERATURE: 97.2 F | OXYGEN SATURATION: 99 % | HEIGHT: 69 IN | DIASTOLIC BLOOD PRESSURE: 86 MMHG | BODY MASS INDEX: 33.03 KG/M2 | SYSTOLIC BLOOD PRESSURE: 148 MMHG

## 2025-01-16 DIAGNOSIS — D35.2 PITUITARY MICROADENOMA (HCC): ICD-10-CM

## 2025-01-16 DIAGNOSIS — D32.9 MENINGIOMA (HCC): Primary | ICD-10-CM

## 2025-01-16 DIAGNOSIS — I10 ESSENTIAL HYPERTENSION: ICD-10-CM

## 2025-01-16 PROCEDURE — 99215 OFFICE O/P EST HI 40 MIN: CPT | Performed by: PHYSICIAN ASSISTANT

## 2025-01-16 RX ORDER — AMLODIPINE AND BENAZEPRIL HYDROCHLORIDE 5; 20 MG/1; MG/1
1 CAPSULE ORAL DAILY
Qty: 90 CAPSULE | Refills: 0 | Status: SHIPPED | OUTPATIENT
Start: 2025-01-16

## 2025-01-16 NOTE — PROGRESS NOTES
Name: Kavon Calix      : 1950      MRN: [id number]  Encounter Provider: Shannan Hardy PA-C  Encounter Date: 2025   Encounter department: [facility] [place]  :  Assessment & Plan  Meningioma  1 year follow up of a right cavernous meningioma  Also with a pituitary lesion that was initially diagnosed on workup of low testosterone in 2016   He moved to the [facility] in 2019 and repeat MRI ordered by his PCP noted a new R cavernous meningioma for which he was evaluated by [place] by Dr. Montgomery. Was told he had no abnormality of the pituitary gland but advised yearly follow-up in regard to this meningioma   He was briefly lost to follow-up because of the pandemic, but has been established with our office since  and was seen in conjunction with Dr. Norton at that time.  Recovering from staged bilateral cataract surgery, no new complaints.  Exam: non-focal     Imaging:   MRI pituitary 25: 1.  Slightly increased size of right cavernous sinus meningioma. 2.  Redemonstrated pituitary gland heterogeneity. Central focus of relative hypoenhancement measuring 5 x 4 mm, grossly stable compared to prior from 2023. Differential considerations include physiologic gland heterogeneity or microadenoma. Correlation with laboratory parameters suggested. 3.  Chronic microangiopathic changes. No acute abnormality.    Plan:   Reviewed images with patient and compared to several prior scans. There is continued slight growth in the right cavernous meningioma.   Discussed natural history of meningiomas. They are generally asymptomatic, benign and slow growing.    Options for management are continued surveillance, surgical resection or SRS/SRT.   Given continued growth, recommend closer interval surveillance with follow up in 6 months with MRI cavernous to see Dr. Norton. Also recommend formal VF/OCT be completed prior to the appointment. Discussed that if there is continued  growth, may need to consider potential intervention options such as SRS versus operative intervention.  Review red flag s/s.   Call sooner with any questions or concerns.       Orders:    BUN; Future    Creatinine, serum; Future    MRI brain cavernous / trigeminal wo and w contrast; Future    Pituitary microadenoma (HCC)  Pituitary Microadenoma   Asymptomatic and stable on MRI.    Plan:   Will continue to closely monitor this likely benign, incidental lesion with yearly imaging.              History of Present Illness   74 year old gentleman seen for 1 year follow up of a meningioma and pituitary microadenoma. The pituitary adenoma was originally diagnosed in 2016 when he was living in [place], [place] at [facility] on workup of low testosterone.  When he establish care after he moved in 2019, MRI revealed the new finding for a right cavernous sinus likely meningioma.  The patient was referred to [place] [facility] for further follow-up and management.  Patient was seen in [place] by Dr. Montgomery in October 2021 and told at this appointment that he did not have a pituitary adenoma, and that they would continue close surveillance of this newly identified meningioma.  Because of the pandemic, he was lost to follow-up, but PCP ordered an MRI in December 2023 for ongoing surveillance.  There was slight growth in the meningioma at that time and he established care with our office.  He has chronic tinnitus bilaterally, though feels this is slightly worsened in the last year.  No headaches or seizures.  He recently underwent staged bilateral cataract surgery and is recovering nicely from this. PMH significant for CAD s/p 4 vessel CABG in 2018 on daily ASA 81mg qd, HTN, HLD, DM2 w/ A1c 6.0%, diabetic neuropathy, CKD3, and gout.      Review of Systems   HENT:  Positive for tinnitus (B?L constant, not new).    Eyes:         Adjusting post cataract surgery, no other complaints   Respiratory:  Negative for chest tightness  and shortness of breath.    Gastrointestinal: Negative.    Genitourinary: Negative.    Musculoskeletal:  Negative for gait problem.   Neurological:  Negative for dizziness, seizures, speech difficulty, weakness, light-headedness, numbness and headaches.   Hematological:  Bruises/bleeds easily (bruises).        ASA 81   Psychiatric/Behavioral:  Negative for confusion and decreased concentration.     I have personally reviewed the MA's review of systems and made changes as necessary.    Past Medical History   Past Medical History:   Diagnosis Date    2019 novel coronavirus disease (COVID-19) 11/16/2020    Abdominal pain, epigastric 08/10/2020    [place] (acute kidney injury) (HCC) 12/07/2020    Allergic 05/2018    Coronary artery disease 2018    COVID-19 virus infection 11/16/2020    Diabetes mellitus ([facility]) 2005    Gout     Podagra alternating toes. No gout attacks for 20 years after staring allopurinol    Heparin induced thrombocytopenia (HCC)     diagnosed at the time of the CABG, had bilateral lower extremity venous clots.     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  He has been following with an ophthalmologist.  Apparently does not have any visual field defect.     Past Surgical History:   Procedure Laterality Date    CATARACT EXTRACTION, BILATERAL Bilateral 12/2024    CORONARY ARTERY BYPASS GRAFT  05/22/2018    [facility]. CABGx 4 (LIMA D1, radial artery to ramus intermedius, SVG to PDA, SVG to PLOM.    HERNIA REPAIR      KNEE ARTHROSCOPY W/ MENISCECTOMY Right     KNEE SURGERY      TONSILLECTOMY      VASCULAR SURGERY  2018    bypass     Family History   Problem Relation Age of Onset    Hypertension Mother     Hyperlipidemia Mother     Heart failure Mother     Macular  degeneration Father     No Known Problems Sister     No Known Problems Sister     Dementia Paternal Grandmother     Arthritis Paternal Grandmother     Glaucoma Maternal Grandmother       reports that he has never smoked. He has never been exposed to tobacco smoke. He has never used smokeless tobacco. He reports that he does not currently use alcohol. He reports that he does not use drugs.  Current Outpatient Medications on File Prior to Visit   Medication Sig Dispense Refill    allopurinol (ZYLOPRIM) 300 mg tablet take 1 tablet by mouth once daily 90 tablet 3    aspirin 81 MG tablet Take 81 mg by mouth daily      Cholecalciferol (VITAMIN D3) 125 MCG (5000 UT) CAPS Take 5,000 Units by mouth daily      gabapentin (NEURONTIN) 300 mg capsule TAKE ONE CAPSULE BY MOUTH DAILY AND TAKE TWO CAPSULES AT BEDTIME 810 capsule 1    hydroCHLOROthiazide 25 mg tablet Take 1 tablet (25 mg total) by mouth daily 90 tablet 1    metFORMIN (GLUCOPHAGE) 500 mg tablet TAKE TWO TABLETS BY MOUTH TWICE A DAY WITH MEALS 720 tablet 0    metoprolol tartrate (LOPRESSOR) 25 mg tablet TAKE ONE TABLET BY MOUTH EVERY 12 HOURS 180 tablet 1    Ozempic, 2 MG/DOSE, 8 MG/3ML injection pen INJECT 0.75 ML (2 MG TOTAL) UNDER THE SKIN EVERY SEVEN DAYS 3 mL 5    Propylene Glycol (SYSTANE COMPLETE OP) Apply to eye      rosuvastatin (CRESTOR) 20 MG tablet TAKE ONE TABLET BY MOUTH DAILY 100 tablet 1    Toujeo Max SoloStar 300 units/mL CONCENTRATED U-300 injection pen (2-unit dial) INJECT 26 UNITS SUBCUTANEOUSLY AT BEDTIME 30 mL 0    vitamin B-12 (VITAMIN B-12) 1,000 mcg tablet Take 1,000 mcg by mouth daily      ketorolac (ACULAR) 0.5 % ophthalmic solution 1 drop (Patient not taking: Reported on 1/16/2025)      ofloxacin (OCUFLOX) 0.3 % ophthalmic solution 1 drop      prednisoLONE acetate (PRED FORTE) 1 % ophthalmic suspension 1 drop      [DISCONTINUED] amLODIPine-benazepril (LOTREL 5-20) 5-20 MG per capsule TAKE ONE CAPSULE BY MOUTH DAILY 100 capsule 1     No  "current facility-administered medications on file prior to visit.     Allergies   Allergen Reactions    Heparin Other (See Comments)     Heparin induced thrombocytopenia and thrombosis manifested by severe thrombocytopenia and bilateral lower extremity DVT      Social History     Tobacco Use    Smoking status: Never     Passive exposure: Never    Smokeless tobacco: Never   Vaping Use    Vaping status: Never Used   Substance and Sexual Activity    Alcohol use: Not Currently    Drug use: Never    Sexual activity: Not Currently        Objective   /86 (BP Location: Left arm, Patient Position: Sitting, Cuff Size: Large)   Pulse 76   Temp (!) 97.2 °F (36.2 °C) (Temporal)   Resp 16   Ht 5' 9\" (1.753 m)   Wt 101 kg (223 lb)   SpO2 99%   BMI 32.93 kg/m²     Physical Exam  Vitals reviewed.   Constitutional:       General: He is awake.      Appearance: Normal appearance.   HENT:      Head: Normocephalic and atraumatic.   Eyes:      Extraocular Movements: Extraocular movements intact.      Conjunctiva/sclera: Conjunctivae normal.      Pupils: Pupils are equal, round, and reactive to light.   Cardiovascular:      Rate and Rhythm: Normal rate.   Pulmonary:      Effort: Pulmonary effort is normal.   Skin:     General: Skin is warm and dry.   Neurological:      Mental Status: He is alert.      Deep Tendon Reflexes:      Reflex Scores:       Bicep reflexes are 2+ on the right side and 2+ on the left side.       Brachioradialis reflexes are 2+ on the right side and 2+ on the left side.       Patellar reflexes are 2+ on the right side and 2+ on the left side.  Psychiatric:         Attention and Perception: Attention and perception normal.         Mood and Affect: Mood and affect normal.         Speech: Speech normal.         Behavior: Behavior normal. Behavior is cooperative.         Thought Content: Thought content normal.         Cognition and Memory: Cognition and memory normal.         Judgment: Judgment normal. "       Neurological Exam  Mental Status  Awake and alert. Memory is normal. Recent and remote memory are intact. Speech is normal. Able to name objects. Follows two-step commands. Able to perform serial calculations. Fund of knowledge is appropriate for level of education.    Cranial Nerves  CN III, IV, VI: Extraocular movements intact bilaterally. Pupils equal round and reactive to light bilaterally.  CN V:  Right: Facial sensation is normal.  Left: Facial sensation is normal on the left.  CN VII: Full and symmetric facial movement.  CN VIII: Hearing is normal.  CN XI:  Right: Trapezius strength is normal.  Left: Trapezius strength is normal.  CN XII: Tongue midline without atrophy or fasciculations.    Motor  Normal muscle bulk throughout. Normal muscle tone. No pronator drift.                                             Right                     Left  Finger flexion                         5                          5  Thumb flexion                        5                          5  Hip flexion                              5                          5  Plantarflexion                         5                          5  Dorsiflexion                            5                          5                                             Right                     Left  Deltoid                                   5                          5   Biceps                                   5                          5   Triceps                                  5                          5    Sensory  Light touch is normal in upper and lower extremities.     Reflexes                                            Right                      Left  Brachioradialis                    2+                         2+  Biceps                                 2+                         2+  Patellar                                2+                         2+    Right pathological reflexes: Una's absent.  Left pathological reflexes:  Una's absent.    Coordination  Right: Finger-to-nose normal.Left: Finger-to-nose normal.    Gait  Casual gait is normal including stance, stride, and arm swing.

## 2025-02-20 ENCOUNTER — APPOINTMENT (OUTPATIENT)
Dept: LAB | Facility: CLINIC | Age: 75
End: 2025-02-20
Payer: MEDICARE

## 2025-02-20 DIAGNOSIS — E11.22 CONTROLLED TYPE 2 DIABETES MELLITUS WITH STAGE 3 CHRONIC KIDNEY DISEASE, WITH LONG-TERM CURRENT USE OF INSULIN (HCC): ICD-10-CM

## 2025-02-20 DIAGNOSIS — N18.30 CONTROLLED TYPE 2 DIABETES MELLITUS WITH STAGE 3 CHRONIC KIDNEY DISEASE, WITH LONG-TERM CURRENT USE OF INSULIN (HCC): ICD-10-CM

## 2025-02-20 DIAGNOSIS — Z79.4 CONTROLLED TYPE 2 DIABETES MELLITUS WITH DIABETIC NEUROPATHY, WITH LONG-TERM CURRENT USE OF INSULIN (HCC): ICD-10-CM

## 2025-02-20 DIAGNOSIS — Z12.5 ENCOUNTER FOR PROSTATE CANCER SCREENING: ICD-10-CM

## 2025-02-20 DIAGNOSIS — E11.40 CONTROLLED TYPE 2 DIABETES MELLITUS WITH DIABETIC NEUROPATHY, WITH LONG-TERM CURRENT USE OF INSULIN (HCC): ICD-10-CM

## 2025-02-20 DIAGNOSIS — Z79.4 CONTROLLED TYPE 2 DIABETES MELLITUS WITH STAGE 3 CHRONIC KIDNEY DISEASE, WITH LONG-TERM CURRENT USE OF INSULIN (HCC): ICD-10-CM

## 2025-02-20 LAB
ANION GAP SERPL CALCULATED.3IONS-SCNC: 10 MMOL/L (ref 4–13)
BUN SERPL-MCNC: 26 MG/DL (ref 5–25)
CALCIUM SERPL-MCNC: 9.6 MG/DL (ref 8.4–10.2)
CHLORIDE SERPL-SCNC: 104 MMOL/L (ref 96–108)
CHOLEST SERPL-MCNC: 100 MG/DL (ref ?–200)
CO2 SERPL-SCNC: 28 MMOL/L (ref 21–32)
CREAT SERPL-MCNC: 1.4 MG/DL (ref 0.6–1.3)
EST. AVERAGE GLUCOSE BLD GHB EST-MCNC: 131 MG/DL
GFR SERPL CREATININE-BSD FRML MDRD: 49 ML/MIN/1.73SQ M
GLUCOSE P FAST SERPL-MCNC: 103 MG/DL (ref 65–99)
HBA1C MFR BLD: 6.2 %
HDLC SERPL-MCNC: 40 MG/DL
LDLC SERPL CALC-MCNC: 34 MG/DL (ref 0–100)
NONHDLC SERPL-MCNC: 60 MG/DL
POTASSIUM SERPL-SCNC: 4.7 MMOL/L (ref 3.5–5.3)
PSA SERPL-MCNC: 1.52 NG/ML (ref 0–4)
SODIUM SERPL-SCNC: 142 MMOL/L (ref 135–147)
TRIGL SERPL-MCNC: 128 MG/DL (ref ?–150)

## 2025-02-20 PROCEDURE — 80048 BASIC METABOLIC PNL TOTAL CA: CPT

## 2025-02-20 PROCEDURE — 83036 HEMOGLOBIN GLYCOSYLATED A1C: CPT

## 2025-02-20 PROCEDURE — G0103 PSA SCREENING: HCPCS

## 2025-02-20 PROCEDURE — 36415 COLL VENOUS BLD VENIPUNCTURE: CPT

## 2025-02-20 PROCEDURE — 80061 LIPID PANEL: CPT

## 2025-02-21 ENCOUNTER — RESULTS FOLLOW-UP (OUTPATIENT)
Dept: FAMILY MEDICINE CLINIC | Facility: CLINIC | Age: 75
End: 2025-02-21

## 2025-02-24 ENCOUNTER — OFFICE VISIT (OUTPATIENT)
Dept: FAMILY MEDICINE CLINIC | Facility: CLINIC | Age: 75
End: 2025-02-24
Payer: MEDICARE

## 2025-02-24 VITALS
WEIGHT: 219.4 LBS | SYSTOLIC BLOOD PRESSURE: 126 MMHG | BODY MASS INDEX: 32.5 KG/M2 | HEART RATE: 76 BPM | HEIGHT: 69 IN | OXYGEN SATURATION: 99 % | DIASTOLIC BLOOD PRESSURE: 74 MMHG

## 2025-02-24 DIAGNOSIS — D32.9 MENINGIOMA (HCC): ICD-10-CM

## 2025-02-24 DIAGNOSIS — D35.2 PITUITARY MICROADENOMA (HCC): ICD-10-CM

## 2025-02-24 DIAGNOSIS — Z79.4 CONTROLLED TYPE 2 DIABETES MELLITUS WITH DIABETIC NEUROPATHY, WITH LONG-TERM CURRENT USE OF INSULIN (HCC): ICD-10-CM

## 2025-02-24 DIAGNOSIS — E78.2 HYPERLIPEMIA, MIXED: ICD-10-CM

## 2025-02-24 DIAGNOSIS — N18.30 CONTROLLED TYPE 2 DIABETES MELLITUS WITH STAGE 3 CHRONIC KIDNEY DISEASE, WITH LONG-TERM CURRENT USE OF INSULIN (HCC): ICD-10-CM

## 2025-02-24 DIAGNOSIS — I10 ESSENTIAL HYPERTENSION: Primary | ICD-10-CM

## 2025-02-24 DIAGNOSIS — E11.40 CONTROLLED TYPE 2 DIABETES MELLITUS WITH DIABETIC NEUROPATHY, WITH LONG-TERM CURRENT USE OF INSULIN (HCC): ICD-10-CM

## 2025-02-24 DIAGNOSIS — Z79.4 CONTROLLED TYPE 2 DIABETES MELLITUS WITH STAGE 3 CHRONIC KIDNEY DISEASE, WITH LONG-TERM CURRENT USE OF INSULIN (HCC): ICD-10-CM

## 2025-02-24 DIAGNOSIS — I25.810 CORONARY ARTERY DISEASE INVOLVING CORONARY BYPASS GRAFT OF NATIVE HEART WITHOUT ANGINA PECTORIS: ICD-10-CM

## 2025-02-24 DIAGNOSIS — E66.01 CLASS 2 SEVERE OBESITY DUE TO EXCESS CALORIES WITH SERIOUS COMORBIDITY AND BODY MASS INDEX (BMI) OF 37.0 TO 37.9 IN ADULT (HCC): ICD-10-CM

## 2025-02-24 DIAGNOSIS — E11.22 CONTROLLED TYPE 2 DIABETES MELLITUS WITH STAGE 3 CHRONIC KIDNEY DISEASE, WITH LONG-TERM CURRENT USE OF INSULIN (HCC): ICD-10-CM

## 2025-02-24 DIAGNOSIS — E66.812 CLASS 2 SEVERE OBESITY DUE TO EXCESS CALORIES WITH SERIOUS COMORBIDITY AND BODY MASS INDEX (BMI) OF 37.0 TO 37.9 IN ADULT (HCC): ICD-10-CM

## 2025-02-24 DIAGNOSIS — N18.31 STAGE 3A CHRONIC KIDNEY DISEASE (HCC): ICD-10-CM

## 2025-02-24 PROCEDURE — G2211 COMPLEX E/M VISIT ADD ON: HCPCS | Performed by: INTERNAL MEDICINE

## 2025-02-24 PROCEDURE — 99214 OFFICE O/P EST MOD 30 MIN: CPT | Performed by: INTERNAL MEDICINE

## 2025-02-24 NOTE — ASSESSMENT & PLAN NOTE
His lipid profile is excellent, with a total cholesterol level of 100 and an LDL cholesterol level of 34. No changes to his current management plan are necessary.  Continue rosuvastatin 20 mg daily.

## 2025-02-24 NOTE — PROGRESS NOTES
Name: Kavon Calix      : 1950      MRN: [id number]  Encounter Provider: Yeyo Castaneda MD  Encounter Date: 2025   Encounter department: [facility]    Assessment & Plan  Class 2 severe obesity due to excess calories with serious comorbidity and body mass index (BMI) of 37.0 to 37.9 in adult (HCC)  His weight is stable.  He is on max dose of Ozempic.  Will continue.       Controlled type 2 diabetes mellitus with stage 3 chronic kidney disease, with long-term current use of insulin ([facility])  Excellent diabetic control on current regimen.  Lab Results   Component Value Date    HGBA1C 6.2 (H) 2025            Stage 3a chronic kidney disease ([facility])  Lab Results   Component Value Date    EGFR 49 2025    EGFR 51 01/10/2025    EGFR 63 10/17/2024    CREATININE 1.40 (H) 2025    CREATININE 1.36 (H) 01/10/2025    CREATININE 1.15 10/17/2024   His creatinine levels have shown a slight increase but remain within the lower range of his historical data over the past 5 to 6 years. He is advised to maintain optimal control of his diabetes and blood pressure to support kidney health. He should avoid medications that could potentially harm the kidneys, such as ibuprofen or Aleve.         Essential hypertension  Blood pressure is controlled on current regimen.       Coronary artery disease involving coronary bypass graft of native heart without angina pectoris  No angina or shortness of breath.  Continue current regimen and risk factor modification.       Controlled type 2 diabetes mellitus with diabetic neuropathy, with long-term current use of insulin ([facility])  His diabetes is well-controlled with an A1c of 6.2%, similar to the previous reading of 6.0% in 2024. He is currently on Toujeo 26 units at bedtime, metformin 1000 mg twice a day, and Ozempic 2 mg once a week. He reports no issues with Ozempic. No changes to his diabetes management plan are necessary at this time.  Lab  Results   Component Value Date    HGBA1C 6.2 (H) 02/20/2025            Hyperlipemia, mixed  His lipid profile is excellent, with a total cholesterol level of 100 and an LDL cholesterol level of 34. No changes to his current management plan are necessary.  Continue rosuvastatin 20 mg daily.       Meningioma  The meningioma has shown a slight increase in size. He will follow up with Dr. Dr. Norton in July 2025 and undergo a follow-up MRI and vision test before the appointment.       Pituitary microadenoma (HCC)  The pituitary gland heterogeneity remains unchanged compared to the MRI from December 2023. No immediate intervention is required.       Prostate-Specific Antigen (PSA) Monitoring.  His PSA level is within the normal range at 1.5, showing a minimal increase from 0.9 in 2019. This slow rise is expected and considered normal.       History of Present Illness     History of Present Illness  The patient is a 74-year-old male who presents for a regular follow-up visit.    He has been diligently monitoring his blood glucose levels at home, which have consistently remained in the low 100s, predominantly in the morning hours. He reports no significant alterations in his appetite during these periods of medication discontinuation. His current antidiabetic regimen includes Toujeo 26 units administered at bedtime, metformin 1000 mg taken twice daily, and Ozempic 2 mg once weekly. He reports good tolerance to Ozempic. It is noteworthy that he had to temporarily discontinue Ozempic on two occasions due to cataract surgery.    He reports no chest pain, pressure, or shortness of breath during physical activity.    He has been following up with the neurosurgeons for his meningioma and pituitary abnormality. He will be seeing Dr. Norton and his PA in July 2025 and will have a follow-up MRI right before that. He had an MRI in January 2025 and saw his assistant in January 2025. The meningioma got a little bit bigger, so they  "will do it in 6 months rather than a year. They called him back in July 2025 for another MRI and a vision test.           Objective   /74 (BP Location: Left arm, Patient Position: Sitting, Cuff Size: Large)   Pulse 76   Ht 5' 9\" (1.753 m)   Wt 99.5 kg (219 lb 6.4 oz)   SpO2 99%   BMI 32.40 kg/m²     Wt Readings from Last 3 Encounters:   02/24/25 99.5 kg (219 lb 6.4 oz)   01/16/25 101 kg (223 lb)   12/02/24 99.5 kg (219 lb 6.4 oz)      Physical Exam  Lungs were auscultated.    Vital Signs  Patient's weight is 219.  Physical Exam  Constitutional:       General: He is not in acute distress.     Appearance: Normal appearance. He is well-developed. He is not ill-appearing.   Neck:      Vascular: No JVD.   Cardiovascular:      Rate and Rhythm: Normal rate and regular rhythm.      Heart sounds: Normal heart sounds. No murmur heard.     No friction rub. No gallop.   Pulmonary:      Breath sounds: Normal breath sounds.   Abdominal:      General: Bowel sounds are normal. There is no distension.      Palpations: Abdomen is soft. There is no mass.   Musculoskeletal:         General: No swelling.   Neurological:      Mental Status: He is alert.         "

## 2025-02-24 NOTE — ASSESSMENT & PLAN NOTE
His diabetes is well-controlled with an A1c of 6.2%, similar to the previous reading of 6.0% in October 2024. He is currently on Toujeo 26 units at bedtime, metformin 1000 mg twice a day, and Ozempic 2 mg once a week. He reports no issues with Ozempic. No changes to his diabetes management plan are necessary at this time.  Lab Results   Component Value Date    HGBA1C 6.2 (H) 02/20/2025

## 2025-02-24 NOTE — ASSESSMENT & PLAN NOTE
Excellent diabetic control on current regimen.  Lab Results   Component Value Date    HGBA1C 6.2 (H) 02/20/2025

## 2025-02-24 NOTE — ASSESSMENT & PLAN NOTE
The pituitary gland heterogeneity remains unchanged compared to the MRI from December 2023. No immediate intervention is required.

## 2025-02-24 NOTE — ASSESSMENT & PLAN NOTE
The meningioma has shown a slight increase in size. He will follow up with Dr. Dr. Norton in July 2025 and undergo a follow-up MRI and vision test before the appointment.

## 2025-02-24 NOTE — ASSESSMENT & PLAN NOTE
Lab Results   Component Value Date    EGFR 49 02/20/2025    EGFR 51 01/10/2025    EGFR 63 10/17/2024    CREATININE 1.40 (H) 02/20/2025    CREATININE 1.36 (H) 01/10/2025    CREATININE 1.15 10/17/2024   His creatinine levels have shown a slight increase but remain within the lower range of his historical data over the past 5 to 6 years. He is advised to maintain optimal control of his diabetes and blood pressure to support kidney health. He should avoid medications that could potentially harm the kidneys, such as ibuprofen or Aleve.

## 2025-04-07 DIAGNOSIS — E78.2 HYPERLIPEMIA, MIXED: ICD-10-CM

## 2025-04-07 DIAGNOSIS — I10 ESSENTIAL HYPERTENSION: ICD-10-CM

## 2025-04-08 RX ORDER — ROSUVASTATIN CALCIUM 20 MG/1
20 TABLET, COATED ORAL DAILY
Qty: 90 TABLET | Refills: 1 | Status: SHIPPED | OUTPATIENT
Start: 2025-04-08

## 2025-04-08 RX ORDER — HYDROCHLOROTHIAZIDE 25 MG/1
25 TABLET ORAL DAILY
Qty: 90 TABLET | Refills: 1 | Status: SHIPPED | OUTPATIENT
Start: 2025-04-08

## 2025-04-14 DIAGNOSIS — I10 ESSENTIAL HYPERTENSION: ICD-10-CM

## 2025-04-16 RX ORDER — AMLODIPINE AND BENAZEPRIL HYDROCHLORIDE 5; 20 MG/1; MG/1
1 CAPSULE ORAL DAILY
Qty: 90 CAPSULE | Refills: 1 | Status: SHIPPED | OUTPATIENT
Start: 2025-04-16

## 2025-05-22 ENCOUNTER — APPOINTMENT (OUTPATIENT)
Dept: LAB | Facility: CLINIC | Age: 75
End: 2025-05-22
Payer: MEDICARE

## 2025-05-22 DIAGNOSIS — Z79.4 CONTROLLED TYPE 2 DIABETES MELLITUS WITH STAGE 3 CHRONIC KIDNEY DISEASE, WITH LONG-TERM CURRENT USE OF INSULIN (HCC): ICD-10-CM

## 2025-05-22 DIAGNOSIS — N18.30 CONTROLLED TYPE 2 DIABETES MELLITUS WITH STAGE 3 CHRONIC KIDNEY DISEASE, WITH LONG-TERM CURRENT USE OF INSULIN (HCC): ICD-10-CM

## 2025-05-22 DIAGNOSIS — E11.22 CONTROLLED TYPE 2 DIABETES MELLITUS WITH STAGE 3 CHRONIC KIDNEY DISEASE, WITH LONG-TERM CURRENT USE OF INSULIN (HCC): ICD-10-CM

## 2025-05-22 LAB
ANION GAP SERPL CALCULATED.3IONS-SCNC: 9 MMOL/L (ref 4–13)
BUN SERPL-MCNC: 18 MG/DL (ref 5–25)
CALCIUM SERPL-MCNC: 9.5 MG/DL (ref 8.4–10.2)
CHLORIDE SERPL-SCNC: 104 MMOL/L (ref 96–108)
CHOLEST SERPL-MCNC: 92 MG/DL (ref ?–200)
CO2 SERPL-SCNC: 28 MMOL/L (ref 21–32)
CREAT SERPL-MCNC: 1.28 MG/DL (ref 0.6–1.3)
EST. AVERAGE GLUCOSE BLD GHB EST-MCNC: 120 MG/DL
GFR SERPL CREATININE-BSD FRML MDRD: 55 ML/MIN/1.73SQ M
GLUCOSE P FAST SERPL-MCNC: 92 MG/DL (ref 65–99)
HBA1C MFR BLD: 5.8 %
HDLC SERPL-MCNC: 38 MG/DL
LDLC SERPL CALC-MCNC: 26 MG/DL (ref 0–100)
NONHDLC SERPL-MCNC: 54 MG/DL
POTASSIUM SERPL-SCNC: 4.3 MMOL/L (ref 3.5–5.3)
SODIUM SERPL-SCNC: 141 MMOL/L (ref 135–147)
TRIGL SERPL-MCNC: 142 MG/DL (ref ?–150)

## 2025-05-22 PROCEDURE — 80048 BASIC METABOLIC PNL TOTAL CA: CPT

## 2025-05-22 PROCEDURE — 36415 COLL VENOUS BLD VENIPUNCTURE: CPT

## 2025-05-22 PROCEDURE — 80061 LIPID PANEL: CPT

## 2025-05-22 PROCEDURE — 83036 HEMOGLOBIN GLYCOSYLATED A1C: CPT

## 2025-05-23 ENCOUNTER — RA CDI HCC (OUTPATIENT)
Dept: OTHER | Facility: HOSPITAL | Age: 75
End: 2025-05-23

## 2025-05-27 ENCOUNTER — OFFICE VISIT (OUTPATIENT)
Dept: FAMILY MEDICINE CLINIC | Facility: CLINIC | Age: 75
End: 2025-05-27
Payer: MEDICARE

## 2025-05-27 VITALS
SYSTOLIC BLOOD PRESSURE: 128 MMHG | HEIGHT: 69 IN | OXYGEN SATURATION: 99 % | BODY MASS INDEX: 31.99 KG/M2 | HEART RATE: 71 BPM | DIASTOLIC BLOOD PRESSURE: 78 MMHG | WEIGHT: 216 LBS

## 2025-05-27 DIAGNOSIS — E11.40 CONTROLLED TYPE 2 DIABETES MELLITUS WITH DIABETIC NEUROPATHY, WITH LONG-TERM CURRENT USE OF INSULIN (HCC): ICD-10-CM

## 2025-05-27 DIAGNOSIS — E78.2 HYPERLIPEMIA, MIXED: ICD-10-CM

## 2025-05-27 DIAGNOSIS — I10 ESSENTIAL HYPERTENSION: Primary | ICD-10-CM

## 2025-05-27 DIAGNOSIS — Z79.4 CONTROLLED TYPE 2 DIABETES MELLITUS WITH DIABETIC NEUROPATHY, WITH LONG-TERM CURRENT USE OF INSULIN (HCC): ICD-10-CM

## 2025-05-27 PROCEDURE — 99214 OFFICE O/P EST MOD 30 MIN: CPT | Performed by: FAMILY MEDICINE

## 2025-05-27 PROCEDURE — G2211 COMPLEX E/M VISIT ADD ON: HCPCS | Performed by: FAMILY MEDICINE

## 2025-05-27 NOTE — PROGRESS NOTES
"Name: Kavon Calix      : 1950      MRN: [id number]  Encounter Provider: Robert Budinetz, MD  Encounter Date: 2025   Encounter department: [facility]  :  Assessment & Plan  Controlled type 2 diabetes mellitus with diabetic neuropathy, with long-term current use of insulin (HCC)  Continue meds, no changes  Lab Results   Component Value Date    HGBA1C 5.8 (H) 2025            Essential hypertension  Continue meds, no changes         Hyperlipemia, mixed  Continue statin                History of Present Illness   Butch is stable and doing well.  He has diabetes/htn/hyperlipidemia.  Recent labs reviewed and look good.  Creatinine back to normal.  BP is well controlled.  Reviewed all meds.  A1c is 5.8.      Review of Systems   Respiratory: Negative.     Cardiovascular: Negative.    Gastrointestinal: Negative.        Objective   /78 (BP Location: Left arm, Patient Position: Sitting, Cuff Size: Large)   Pulse 71   Ht 5' 9\" (1.753 m)   Wt 98 kg (216 lb)   SpO2 99%   BMI 31.90 kg/m²      Physical Exam  Vitals and nursing note reviewed.   Constitutional:       General: He is not in acute distress.     Appearance: He is well-developed.   HENT:      Head: Normocephalic and atraumatic.     Eyes:      Conjunctiva/sclera: Conjunctivae normal.       Cardiovascular:      Rate and Rhythm: Normal rate and regular rhythm.      Heart sounds: No murmur heard.  Pulmonary:      Effort: Pulmonary effort is normal. No respiratory distress.      Breath sounds: Normal breath sounds.   Abdominal:      Palpations: Abdomen is soft.      Tenderness: There is no abdominal tenderness.     Musculoskeletal:         General: No swelling.      Cervical back: Neck supple.     Skin:     General: Skin is warm and dry.      Capillary Refill: Capillary refill takes less than 2 seconds.     Neurological:      Mental Status: He is alert.     Psychiatric:         Mood and Affect: Mood normal.         "

## 2025-06-16 DIAGNOSIS — E11.22 CONTROLLED TYPE 2 DIABETES MELLITUS WITH STAGE 3 CHRONIC KIDNEY DISEASE, WITH LONG-TERM CURRENT USE OF INSULIN (HCC): ICD-10-CM

## 2025-06-16 DIAGNOSIS — N18.30 CONTROLLED TYPE 2 DIABETES MELLITUS WITH STAGE 3 CHRONIC KIDNEY DISEASE, WITH LONG-TERM CURRENT USE OF INSULIN (HCC): ICD-10-CM

## 2025-06-16 DIAGNOSIS — Z79.4 CONTROLLED TYPE 2 DIABETES MELLITUS WITH STAGE 3 CHRONIC KIDNEY DISEASE, WITH LONG-TERM CURRENT USE OF INSULIN (HCC): ICD-10-CM

## 2025-07-10 ENCOUNTER — HOSPITAL ENCOUNTER (OUTPATIENT)
Dept: MRI IMAGING | Facility: HOSPITAL | Age: 75
End: 2025-07-10
Payer: MEDICARE

## 2025-07-10 DIAGNOSIS — D32.9 MENINGIOMA (HCC): ICD-10-CM

## 2025-07-10 PROCEDURE — A9585 GADOBUTROL INJECTION: HCPCS | Performed by: PHYSICIAN ASSISTANT

## 2025-07-10 PROCEDURE — 70553 MRI BRAIN STEM W/O & W/DYE: CPT

## 2025-07-10 RX ORDER — GADOBUTROL 604.72 MG/ML
9 INJECTION INTRAVENOUS
Status: COMPLETED | OUTPATIENT
Start: 2025-07-10 | End: 2025-07-10

## 2025-07-10 RX ADMIN — GADOBUTROL 9 ML: 604.72 INJECTION INTRAVENOUS at 13:04

## 2025-07-14 ENCOUNTER — APPOINTMENT (OUTPATIENT)
Dept: LAB | Facility: CLINIC | Age: 75
End: 2025-07-14
Payer: MEDICARE

## 2025-07-14 DIAGNOSIS — Z79.4 CONTROLLED TYPE 2 DIABETES MELLITUS WITH STAGE 3 CHRONIC KIDNEY DISEASE, WITH LONG-TERM CURRENT USE OF INSULIN (HCC): ICD-10-CM

## 2025-07-14 DIAGNOSIS — E11.22 CONTROLLED TYPE 2 DIABETES MELLITUS WITH STAGE 3 CHRONIC KIDNEY DISEASE, WITH LONG-TERM CURRENT USE OF INSULIN (HCC): ICD-10-CM

## 2025-07-14 DIAGNOSIS — N18.30 CONTROLLED TYPE 2 DIABETES MELLITUS WITH STAGE 3 CHRONIC KIDNEY DISEASE, WITH LONG-TERM CURRENT USE OF INSULIN (HCC): ICD-10-CM

## 2025-07-14 DIAGNOSIS — D32.9 MENINGIOMA (HCC): ICD-10-CM

## 2025-07-14 LAB
BUN SERPL-MCNC: 22 MG/DL (ref 5–25)
CREAT SERPL-MCNC: 1.45 MG/DL (ref 0.6–1.3)
GFR SERPL CREATININE-BSD FRML MDRD: 47 ML/MIN/1.73SQ M

## 2025-07-14 PROCEDURE — 36415 COLL VENOUS BLD VENIPUNCTURE: CPT

## 2025-07-14 PROCEDURE — 82565 ASSAY OF CREATININE: CPT

## 2025-07-14 PROCEDURE — 84520 ASSAY OF UREA NITROGEN: CPT

## 2025-07-22 ENCOUNTER — OFFICE VISIT (OUTPATIENT)
Dept: NEUROSURGERY | Facility: CLINIC | Age: 75
End: 2025-07-22
Payer: MEDICARE

## 2025-07-22 VITALS
RESPIRATION RATE: 16 BRPM | DIASTOLIC BLOOD PRESSURE: 80 MMHG | HEART RATE: 73 BPM | HEIGHT: 69 IN | BODY MASS INDEX: 31.25 KG/M2 | TEMPERATURE: 98 F | OXYGEN SATURATION: 99 % | SYSTOLIC BLOOD PRESSURE: 130 MMHG | WEIGHT: 211 LBS

## 2025-07-22 DIAGNOSIS — D35.2 PITUITARY MICROADENOMA (HCC): ICD-10-CM

## 2025-07-22 DIAGNOSIS — D32.9 MENINGIOMA (HCC): Primary | ICD-10-CM

## 2025-07-22 PROCEDURE — 99214 OFFICE O/P EST MOD 30 MIN: CPT | Performed by: NEUROLOGICAL SURGERY

## 2025-07-22 NOTE — ASSESSMENT & PLAN NOTE
This pituitary abnormality may in fact be a microadenoma.  He had laboratory studies screened when he was at [facility] many years ago but has not had anything done in the meantime.  These laboratory values will be helpful as a baseline prior to the stereotactic radiosurgery as well as an early sign to assess for intervention  Orders:    Insulin-like growth factor 1 (IGF-1); Future    TSH, 3rd generation; Future    ACTH; Future    Cortisol Level,7-9 AM Specimen; Future    Alpha Subunit, Free; Future    Prolactin; Future    Follicle stimulating hormone; Future    Luteinizing hormone; Future    Testosterone, free, total; Future

## 2025-07-22 NOTE — ASSESSMENT & PLAN NOTE
Para cavernous meningioma which causes intermittent diplopia.  I have recommended stereotactic radiosurgery for this.  I have made the appropriate referrals and will plan on seeing him back for follow-up based on when he has the seizure performed.  Orders:    Ambulatory Referral to Radiation Oncology; Future

## 2025-07-22 NOTE — PROGRESS NOTES
Name: Kavon Calix      : 1950      MRN: [id number]  Encounter Provider: Kelby Norton MD  Encounter Date: 2025   Encounter department: [facility]  :  Assessment & Plan  Meningioma  Para cavernous meningioma which causes intermittent diplopia.  I have recommended stereotactic radiosurgery for this.  I have made the appropriate referrals and will plan on seeing him back for follow-up based on when he has the seizure performed.  Orders:    Ambulatory Referral to Radiation Oncology; Future    Pituitary microadenoma (HCC)  This pituitary abnormality may in fact be a microadenoma.  He had laboratory studies screened when he was at [facility] many years ago but has not had anything done in the meantime.  These laboratory values will be helpful as a baseline prior to the stereotactic radiosurgery as well as an early sign to assess for intervention  Orders:    Insulin-like growth factor 1 (IGF-1); Future    TSH, 3rd generation; Future    ACTH; Future    Cortisol Level,7-9 AM Specimen; Future    Alpha Subunit, Free; Future    Prolactin; Future    Follicle stimulating hormone; Future    Luteinizing hormone; Future    Testosterone, free, total; Future        History of Present Illness     Kavon Calix is a 75 y.o. male who presents f/u for meningioma, pit microadenoma     HPI   Doing well  No pain  Worsening double vision  No headaches  Tinnitus unchanged  The double vision is intermittent in nature.  It occurs most at night when driving.  I have recommended that he not drive at night because of this.  He his endocrinologist at [facility] many years ago identified that he had a reduced testosterone level.  Prior to his cardiac surgery he was advised to stop his testosterone and has not restarted it.  He has had no recent endocrine assessment.    Review of Systems   HENT:  Positive for tinnitus (B/L, ringing, not new).    Eyes:  Positive for visual  disturbance (some diplopia, worsened).   Respiratory:  Negative for chest tightness and shortness of breath.    Gastrointestinal: Negative.    Genitourinary: Negative.    Musculoskeletal:  Negative for gait problem.   Neurological:  Negative for dizziness, seizures, speech difficulty, weakness, light-headedness, numbness and headaches.   Hematological:  Bruises/bleeds easily.        ASA 81   Psychiatric/Behavioral:  Negative for confusion and decreased concentration.      I have personally reviewed the MA's review of systems and made changes as necessary.    Past Medical History   Past Medical History[1]  Past Surgical History[2]  Family History[3]  he reports that he has never smoked. He has never been exposed to tobacco smoke. He has never used smokeless tobacco. He reports that he does not currently use alcohol. He reports that he does not use drugs.  Current Outpatient Medications   Medication Instructions    allopurinol (ZYLOPRIM) 300 mg tablet take 1 tablet by mouth once daily    amLODIPine-benazepril (LOTREL 5-20) 5-20 MG per capsule 1 capsule, Oral, Daily    aspirin 81 mg, Daily    gabapentin (NEURONTIN) 300 mg capsule TAKE ONE CAPSULE BY MOUTH DAILY AND TAKE TWO CAPSULES AT BEDTIME    hydroCHLOROthiazide 25 mg, Oral, Daily    metFORMIN (GLUCOPHAGE) 1,000 mg, Oral, 2 times daily with meals    metoprolol tartrate (LOPRESSOR) 25 mg, Oral, Every 12 hours    ofloxacin (OCUFLOX) 0.3 % ophthalmic solution 1 drop    Propylene Glycol (SYSTANE COMPLETE OP) Apply to eye    rosuvastatin (CRESTOR) 20 mg, Oral, Daily    semaglutide (2 mg/dose) (OZEMPIC (2 MG/DOSE)) 2 mg, Subcutaneous, Every 7 days    David Hanson SoloStar 300 units/mL CONCENTRATED U-300 injection pen (2-unit dial) INJECT 26 UNITS SUBCUTANEOUSLY AT BEDTIME    vitamin B-12 (VITAMIN B-12) 1,000 mcg, Daily    Vitamin D3 5,000 Units, Daily   Allergies[4]   Objective   /80 (BP Location: Right arm, Patient Position: Sitting, Cuff Size: Large)   Pulse 73    "Temp 98 °F (36.7 °C) (Temporal)   Resp 16   Ht 5' 9\" (1.753 m)   Wt 95.7 kg (211 lb)   SpO2 99%   BMI 31.16 kg/m²     Physical Exam  Vitals and nursing note reviewed.   Constitutional:       General: He is not in acute distress.     Appearance: Normal appearance. He is normal weight. He is not ill-appearing, toxic-appearing or diaphoretic.   HENT:      Head: Normocephalic and atraumatic.      Nose: Nose normal.     Eyes:      Extraocular Movements: Extraocular movements intact.      Pupils: Pupils are equal, round, and reactive to light.      Comments: His extraocular movements were intact in all fields and he complained of no diplopia in any of the primary visual fields at today's examination     Musculoskeletal:         General: No swelling, tenderness, deformity or signs of injury. Normal range of motion.      Cervical back: Normal range of motion and neck supple.      Right lower leg: No edema.      Left lower leg: No edema.     Skin:     General: Skin is warm and dry.     Neurological:      General: No focal deficit present.      Mental Status: He is alert and oriented to person, place, and time. Mental status is at baseline.      Cranial Nerves: No cranial nerve deficit.      Sensory: No sensory deficit.      Motor: No weakness.      Coordination: Coordination normal.      Gait: Gait ( ) normal.     Psychiatric:         Mood and Affect: Mood normal.         Behavior: Behavior normal.         Thought Content: Thought content normal.         Judgment: Judgment normal.       Neurological Exam  Mental Status  Alert. Oriented to person, place, and time.    Cranial Nerves  CN II: Vision test: His extraocular movements were intact in all fields and he complained of no diplopia in any of the primary visual fields at today's examination.  CN III, IV, VI: Extraocular movements intact bilaterally. Pupils equal round and reactive to light bilaterally.    Gait   Normal gait ( ).      Radiology Results Review: I " personally reviewed the following image studies in PACS and associated radiology reports: MRI brain. My interpretation of the radiology images/reports is: MRI of the brain is carefully reviewed and compared with previous studies.  Below is comparison of the 7/10/2025 study to the 10/5/2021 study.  This demonstrates a growth in the size of the para-cavernous meningioma.  There is an abnormality in the pituitary gland which may be a pituitary microadenoma.  No changes identified in the size of this..               [1]   Past Medical History:  Diagnosis Date    2019 novel coronavirus disease (COVID-19) 11/16/2020    Abdominal pain, epigastric 08/10/2020    [place] (acute kidney injury) (HCC) 12/07/2020    Allergic 05/2018    Coronary artery disease 2018    COVID-19 virus infection 11/16/2020    Diabetes mellitus (HCC) 2005    Gout     Podagra alternating toes. No gout attacks for 20 years after staring allopurinol    Heparin induced thrombocytopenia (HCC)     diagnosed at the time of the CABG, had bilateral lower extremity venous clots.     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  He has been following with an ophthalmologist.  Apparently does not have any visual field defect.   [2]   Past Surgical History:  Procedure Laterality Date    CATARACT EXTRACTION, BILATERAL Bilateral 12/2024    CORONARY ARTERY BYPASS GRAFT  05/22/2018    [facility]. CABGx 4 (LIMA D1, radial artery to ramus intermedius, SVG to PDA, SVG to PLOM.    HERNIA REPAIR      KNEE ARTHROSCOPY W/ MENISCECTOMY Right     KNEE SURGERY      TONSILLECTOMY      VASCULAR SURGERY  2018    bypass   [3]   Family History  Problem Relation Name Age of Onset    Hypertension Mother Alva Calix     Hyperlipidemia Mother  Alva Calix     Heart failure Mother Alva Calix     Macular degeneration Father      No Known Problems Sister      No Known Problems Sister      Dementia Paternal Grandmother Irina Calix     Arthritis Paternal Grandmother Irina Calix     Glaucoma Maternal Grandmother Linda Herndon    [4]   Allergies  Allergen Reactions    Heparin Other (See Comments)     Heparin induced thrombocytopenia and thrombosis manifested by severe thrombocytopenia and bilateral lower extremity DVT

## 2025-07-25 ENCOUNTER — APPOINTMENT (OUTPATIENT)
Dept: LAB | Facility: CLINIC | Age: 75
End: 2025-07-25
Payer: MEDICARE

## 2025-07-25 DIAGNOSIS — D35.2 PITUITARY MICROADENOMA (HCC): ICD-10-CM

## 2025-07-25 LAB
ANION GAP SERPL CALCULATED.3IONS-SCNC: 10 MMOL/L (ref 4–13)
BUN SERPL-MCNC: 21 MG/DL (ref 5–25)
CALCIUM SERPL-MCNC: 9.3 MG/DL (ref 8.4–10.2)
CHLORIDE SERPL-SCNC: 105 MMOL/L (ref 96–108)
CHOLEST SERPL-MCNC: 94 MG/DL (ref ?–200)
CO2 SERPL-SCNC: 27 MMOL/L (ref 21–32)
CORTIS AM PEAK SERPL-MCNC: 16.1 UG/DL (ref 6.7–22.6)
CREAT SERPL-MCNC: 1.24 MG/DL (ref 0.6–1.3)
EST. AVERAGE GLUCOSE BLD GHB EST-MCNC: 117 MG/DL
FSH SERPL-ACNC: 16.3 MIU/ML (ref 1.3–19.3)
GFR SERPL CREATININE-BSD FRML MDRD: 57 ML/MIN/1.73SQ M
GLUCOSE P FAST SERPL-MCNC: 92 MG/DL (ref 65–99)
HBA1C MFR BLD: 5.7 %
HDLC SERPL-MCNC: 39 MG/DL
LDLC SERPL CALC-MCNC: 31 MG/DL (ref 0–100)
LH SERPL-ACNC: 8 MIU/ML (ref 1.2–8.6)
NONHDLC SERPL-MCNC: 55 MG/DL
POTASSIUM SERPL-SCNC: 4.1 MMOL/L (ref 3.5–5.3)
PROLACTIN SERPL-MCNC: 8.73 NG/ML (ref 2.64–13.13)
SODIUM SERPL-SCNC: 142 MMOL/L (ref 135–147)
TRIGL SERPL-MCNC: 120 MG/DL (ref ?–150)
TSH SERPL DL<=0.05 MIU/L-ACNC: 1.57 UIU/ML (ref 0.45–4.5)

## 2025-07-25 PROCEDURE — 84443 ASSAY THYROID STIM HORMONE: CPT

## 2025-07-25 PROCEDURE — 82024 ASSAY OF ACTH: CPT

## 2025-07-25 PROCEDURE — 84146 ASSAY OF PROLACTIN: CPT

## 2025-07-25 PROCEDURE — 82533 TOTAL CORTISOL: CPT

## 2025-07-25 PROCEDURE — 84403 ASSAY OF TOTAL TESTOSTERONE: CPT

## 2025-07-25 PROCEDURE — 83520 IMMUNOASSAY QUANT NOS NONAB: CPT

## 2025-07-25 PROCEDURE — 84305 ASSAY OF SOMATOMEDIN: CPT

## 2025-07-25 PROCEDURE — 36415 COLL VENOUS BLD VENIPUNCTURE: CPT

## 2025-07-25 PROCEDURE — 80061 LIPID PANEL: CPT

## 2025-07-25 PROCEDURE — 83036 HEMOGLOBIN GLYCOSYLATED A1C: CPT

## 2025-07-25 PROCEDURE — 80048 BASIC METABOLIC PNL TOTAL CA: CPT

## 2025-07-25 PROCEDURE — 83001 ASSAY OF GONADOTROPIN (FSH): CPT

## 2025-07-25 PROCEDURE — 83002 ASSAY OF GONADOTROPIN (LH): CPT

## 2025-07-25 PROCEDURE — 84402 ASSAY OF FREE TESTOSTERONE: CPT

## 2025-07-26 LAB
ACTH PLAS-MCNC: 32.9 PG/ML (ref 7.2–63.3)
TESTOST FREE SERPL-MCNC: 5.5 PG/ML (ref 6.6–18.1)
TESTOST SERPL-MCNC: 238 NG/DL (ref 264–916)

## 2025-07-30 LAB — IGF-I SERPL-MCNC: 126 NG/ML (ref 53–222)

## 2025-08-02 DIAGNOSIS — Z79.4 CONTROLLED TYPE 2 DIABETES MELLITUS WITH DIABETIC NEUROPATHY, WITH LONG-TERM CURRENT USE OF INSULIN (HCC): ICD-10-CM

## 2025-08-02 DIAGNOSIS — E11.40 CONTROLLED TYPE 2 DIABETES MELLITUS WITH DIABETIC NEUROPATHY, WITH LONG-TERM CURRENT USE OF INSULIN (HCC): ICD-10-CM

## 2025-08-02 LAB — ALPHA SUBUNIT FREE SERPL-MCNC: 0.38 NG/ML

## 2025-08-03 RX ORDER — INSULIN GLARGINE 300 U/ML
INJECTION, SOLUTION SUBCUTANEOUS
Qty: 30 ML | Refills: 3 | Status: SHIPPED | OUTPATIENT
Start: 2025-08-03

## 2025-08-04 RX ORDER — INSULIN GLARGINE 300 U/ML
INJECTION, SOLUTION SUBCUTANEOUS
Qty: 30 ML | Refills: 0 | Status: SHIPPED | OUTPATIENT
Start: 2025-08-04

## 2025-08-08 DIAGNOSIS — N18.30 CONTROLLED TYPE 2 DIABETES MELLITUS WITH STAGE 3 CHRONIC KIDNEY DISEASE, WITH LONG-TERM CURRENT USE OF INSULIN (HCC): ICD-10-CM

## 2025-08-08 DIAGNOSIS — E11.22 CONTROLLED TYPE 2 DIABETES MELLITUS WITH STAGE 3 CHRONIC KIDNEY DISEASE, WITH LONG-TERM CURRENT USE OF INSULIN (HCC): ICD-10-CM

## 2025-08-08 DIAGNOSIS — Z79.4 CONTROLLED TYPE 2 DIABETES MELLITUS WITH STAGE 3 CHRONIC KIDNEY DISEASE, WITH LONG-TERM CURRENT USE OF INSULIN (HCC): ICD-10-CM

## 2025-08-11 ENCOUNTER — CONSULT (OUTPATIENT)
Dept: RADIATION ONCOLOGY | Facility: CLINIC | Age: 75
End: 2025-08-11
Attending: NEUROLOGICAL SURGERY
Payer: MEDICARE

## 2025-08-15 ENCOUNTER — TELEPHONE (OUTPATIENT)
Age: 75
End: 2025-08-15

## 2025-08-18 ENCOUNTER — TELEPHONE (OUTPATIENT)
Dept: RADIATION ONCOLOGY | Facility: CLINIC | Age: 75
End: 2025-08-18